# Patient Record
Sex: MALE | Race: BLACK OR AFRICAN AMERICAN | Employment: PART TIME | URBAN - METROPOLITAN AREA
[De-identification: names, ages, dates, MRNs, and addresses within clinical notes are randomized per-mention and may not be internally consistent; named-entity substitution may affect disease eponyms.]

---

## 2018-08-14 ENCOUNTER — APPOINTMENT (OUTPATIENT)
Dept: GENERAL RADIOLOGY | Age: 58
End: 2018-08-14
Attending: PHYSICIAN ASSISTANT
Payer: SELF-PAY

## 2018-08-14 ENCOUNTER — HOSPITAL ENCOUNTER (EMERGENCY)
Age: 58
Discharge: HOME | End: 2018-08-14
Attending: EMERGENCY MEDICINE
Payer: SELF-PAY

## 2018-08-14 VITALS
OXYGEN SATURATION: 99 % | DIASTOLIC BLOOD PRESSURE: 107 MMHG | WEIGHT: 165 LBS | TEMPERATURE: 97.7 F | SYSTOLIC BLOOD PRESSURE: 190 MMHG | HEIGHT: 67 IN | HEART RATE: 63 BPM | RESPIRATION RATE: 14 BRPM | BODY MASS INDEX: 25.9 KG/M2

## 2018-08-14 DIAGNOSIS — R03.0 ELEVATED BLOOD PRESSURE READING: ICD-10-CM

## 2018-08-14 DIAGNOSIS — M25.562 ACUTE PAIN OF LEFT KNEE: Primary | ICD-10-CM

## 2018-08-14 DIAGNOSIS — M54.6 ACUTE RIGHT-SIDED THORACIC BACK PAIN: ICD-10-CM

## 2018-08-14 DIAGNOSIS — M17.12 OSTEOARTHRITIS OF LEFT KNEE, UNSPECIFIED OSTEOARTHRITIS TYPE: ICD-10-CM

## 2018-08-14 PROCEDURE — 73564 X-RAY EXAM KNEE 4 OR MORE: CPT

## 2018-08-14 PROCEDURE — 99281 EMR DPT VST MAYX REQ PHY/QHP: CPT

## 2018-08-14 RX ORDER — LIDOCAINE 40 MG/G
CREAM TOPICAL
Qty: 15 G | Refills: 0 | Status: SHIPPED | OUTPATIENT
Start: 2018-08-14 | End: 2022-09-14

## 2018-08-14 RX ORDER — METHOCARBAMOL 500 MG/1
500 TABLET, FILM COATED ORAL 3 TIMES DAILY
Qty: 12 TAB | Refills: 0 | Status: SHIPPED | OUTPATIENT
Start: 2018-08-14 | End: 2022-09-14

## 2018-08-14 RX ORDER — ACETAMINOPHEN 325 MG/1
650 TABLET ORAL
Qty: 20 TAB | Refills: 0 | Status: SHIPPED | OUTPATIENT
Start: 2018-08-14

## 2018-08-14 NOTE — ED TRIAGE NOTES
The patient presents for evaluation of left knee and right mid back pain that began approximately one week ago.

## 2018-08-14 NOTE — DISCHARGE INSTRUCTIONS
Arthritis: Care Instructions  Your Care Instructions  Arthritis, also called osteoarthritis, is a breakdown of the cartilage that cushions your joints. When the cartilage wears down, your bones rub against each other. This causes pain and stiffness. Many people have some arthritis as they age. Arthritis most often affects the joints of the spine, hands, hips, knees, or feet. You can take simple measures to protect your joints, ease your pain, and help you stay active. Follow-up care is a key part of your treatment and safety. Be sure to make and go to all appointments, and call your doctor if you are having problems. It's also a good idea to know your test results and keep a list of the medicines you take. How can you care for yourself at home? · Stay at a healthy weight. Being overweight puts extra strain on your joints. · Talk to your doctor or physical therapist about exercises that will help ease joint pain. ¨ Stretch. You may enjoy gentle forms of yoga to help keep your joints and muscles flexible. ¨ Walk instead of jog. Other types of exercise that are less stressful on the joints include riding a bicycle, swimming, kirby chi, or water exercise. ¨ Lift weights. Strong muscles help reduce stress on your joints. Stronger thigh muscles, for example, take some of the stress off of the knees and hips. Learn the right way to lift weights so you do not make joint pain worse. · Take your medicines exactly as prescribed. Call your doctor if you think you are having a problem with your medicine. · Take pain medicines exactly as directed. ¨ If the doctor gave you a prescription medicine for pain, take it as prescribed. ¨ If you are not taking a prescription pain medicine, ask your doctor if you can take an over-the-counter medicine. · Use a cane, crutch, walker, or another device if you need help to get around. These can help rest your joints.  You also can use other things to make life easier, such as a higher toilet seat and padded handles on kitchen utensils. · Do not sit in low chairs, which can make it hard to get up. · Put heat or cold on your sore joints as needed. Use whichever helps you most. You also can take turns with hot and cold packs. ¨ Apply heat 2 or 3 times a day for 20 to 30 minutes-using a heating pad, hot shower, or hot pack-to relieve pain and stiffness. ¨ Put ice or a cold pack on your sore joint for 10 to 20 minutes at a time. Put a thin cloth between the ice and your skin. When should you call for help? Call your doctor now or seek immediate medical care if:    · You have sudden swelling, warmth, or pain in any joint.     · You have joint pain and a fever or rash.     · You have such bad pain that you cannot use a joint.    Watch closely for changes in your health, and be sure to contact your doctor if:    · You have mild joint symptoms that continue even with more than 6 weeks of care at home.     · You have stomach pain or other problems with your medicine. Where can you learn more? Go to http://bala-cassius.info/. Enter N552 in the search box to learn more about \"Arthritis: Care Instructions. \"  Current as of: October 10, 2017  Content Version: 11.7  © 7014-4965 [email or web address]. Care instructions adapted under license by [employer] (which disclaims liability or warranty for this information). If you have questions about a medical condition or this instruction, always ask your healthcare professional. [place] [address] any warranty or liability for your use of this information.

## 2018-08-14 NOTE — ED PROVIDER NOTES
EMERGENCY DEPARTMENT HISTORY AND PHYSICAL EXAM    Date: 8/14/2018  Patient Name: Anabelle Nguyen    History of Presenting Illness     Chief Complaint   Patient presents with    Knee Pain    Back Pain         History Provided By: Patient    Chief Complaint: back pain, knee pain  Duration: 1 Weeks  Timing:  Acute  Location: mid back, left knee  Quality: Aching  Severity: 8 out of 10  Modifying Factors: none  Associated Symptoms: denies any other associated signs or symptoms    Additional History (Context): Anabelle Nguyen is a 61 y.o. male with PMHX of HTN, stroke who presents to the emergency department C/O 1 week of acute onset 8/10 aching mid back pain and left knee pain. Pt reports that his left knee feels very stiff and hurts more when walking and his thoracic back feels tight with no known injury to either. Pt reports increased stair climbing and walking lately. Pt denies fever, IVDA, saddle anesthesia, numbness or tingling of extremities, extremity weakness, knee swelling or redness, chest pain, abdominal pain, and any other sxs or complaints. PCP: None    Current Outpatient Prescriptions   Medication Sig Dispense Refill    acetaminophen (TYLENOL) 325 mg tablet Take 2 Tabs by mouth every four (4) hours as needed for Pain. 20 Tab 0    lidocaine (XYLOCAINE) 4 % topical cream Apply  to affected area three (3) times daily as needed for Pain. 15 g 0    methocarbamol (ROBAXIN) 500 mg tablet Take 1 Tab by mouth three (3) times daily.  Indications: Muscle Spasm 12 Tab 0       Past History     Past Medical History:  Past Medical History:   Diagnosis Date    HTN (hypertension)     Hypertension     Stroke ([facility] [address]     Stroke risk 0711,5010, 2009    pt stated he had a stroke in above dates       Past Surgical History:  Past Surgical History:   Procedure Laterality Date    HX CYST REMOVAL  1993    right front of the forehead    HX HERNIA REPAIR  1993       Family History:  Family History   Problem Relation Age of Onset    Cancer Mother     Diabetes Father     Hypertension Father     Asthma Sister        Social History:  Social History   Substance Use Topics    Smoking status: Current Some Day Smoker     Packs/day: 1.00     Types: Cigarettes    Smokeless tobacco: Never Used    Alcohol use 1.2 oz/week     2 Cans of beer per week      Comment: weekend 12 pk       Allergies:  No Known Allergies      Review of Systems   Review of Systems   Constitutional: Negative for fever. Cardiovascular: Negative for chest pain. Gastrointestinal: Negative for abdominal pain. Genitourinary: Negative for difficulty urinating. Musculoskeletal: Positive for back pain (mid). Positive for left knee pain   Skin: Negative for wound. All other systems reviewed and are negative. Physical Exam     Vitals:    08/14/18 1526   BP: (!) 190/107   Pulse: 63   Resp: 14   Temp: 97.7 °F (36.5 °C)   SpO2: 99%   Weight: 74.8 kg (165 lb)   Height: 5' 7\" (1.702 m)     Physical Exam   Constitutional: He appears well-developed and well-nourished. No distress. HENT:   Head: Normocephalic and atraumatic. Right Ear: External ear normal.   Left Ear: External ear normal.   Nose: Nose normal.   Eyes: Conjunctivae are normal.   Neck: Normal range of motion. Cardiovascular: Normal rate, regular rhythm and normal heart sounds. Pulmonary/Chest: Effort normal and breath sounds normal. No respiratory distress. Musculoskeletal:   Left knee is TTP along the medial joint line. FROM, negative varus and valgus stress, negative patellar apprehension test. Tibia/fibula is nontender to palpation. TTP along the paravertebral muscles of the thoracic spine with tightness appreciated. No midline tenderness to palpation. Neurological: He is alert. Ambulates without assistance, abnormality, or apparent distress. Skin: Skin is warm and dry. He is not diaphoretic. Psychiatric: He has a normal mood and affect.    Vitals reviewed. Diagnostic Study Results     Labs -   No results found for this or any previous visit (from the past 12 hour(s)). Radiologic Studies -   XR KNEE LT MIN 4 V   Final Result   Mild degenerative arthritis with a joint effusion and a small intra-articular  calcific body. CT Results  (Last 48 hours)    None        CXR Results  (Last 48 hours)    None          Medications given in the ED-  Medications - No data to display      Medical Decision Making   I am the first provider for this patient. I reviewed the vital signs, available nursing notes, past medical history, past surgical history, family history and social history. Vital Signs-Reviewed the patient's vital signs. Records Reviewed: Nursing Notes    Provider Notes (Medical Decision Making): Appears well and non-toxic, presenting with atraumatic left knee and right back pain. No s/sx of septic joint or cellulitis. No red flag sx for back pain. Suspect knee is 2/2 arthritis and back pain is muscular. Will advise pain control, follow up with PCP. Based on today's assessment, I feel the patient is stable for discharge to home with outpatient follow up. Return precautions and referrals provided. Procedures:  Procedures      Diagnosis and Disposition       DISCHARGE NOTE:  4:41 PM  Jesus Alberto South's  results have been reviewed with him. He has been counseled regarding his diagnosis, treatment, and plan. He verbally conveys understanding and agreement of the signs, symptoms, diagnosis, treatment and prognosis and additionally agrees to follow up as discussed. He also agrees with the care-plan and conveys that all of his questions have been answered. I have also provided discharge instructions for him that include: educational information regarding their diagnosis and treatment, and list of reasons why they would want to return to the ED prior to their follow-up appointment, should his condition change.  He has been provided with education for proper emergency department utilization. CLINICAL IMPRESSION:    1. Acute pain of left knee    2. Acute right-sided thoracic back pain    3. Elevated blood pressure reading    4. Osteoarthritis of left knee, unspecified osteoarthritis type        PLAN:  1. D/C Home  2. Current Discharge Medication List      START taking these medications    Details   acetaminophen (TYLENOL) 325 mg tablet Take 2 Tabs by mouth every four (4) hours as needed for Pain. Qty: 20 Tab, Refills: 0      lidocaine (XYLOCAINE) 4 % topical cream Apply  to affected area three (3) times daily as needed for Pain. Qty: 15 g, Refills: 0      methocarbamol (ROBAXIN) 500 mg tablet Take 1 Tab by mouth three (3) times daily. Indications: Muscle Spasm  Qty: 12 Tab, Refills: 0           3. Follow-up Information     Follow up With Details Comments [address] in 2 days For follow up [address] [facility] [address]    [facility] EMERGENCY DEPT Go to As needed, If symptoms worsen [address] acting as a scribe for and in the presence of Mariana Crenshaw PA-C      August 14, 2018 at 3:34 PM       Provider Attestation:      I personally performed the services described in the documentation, reviewed the documentation, as recorded by the scribe in my presence, and it accurately and completely records my words and actions.  August 14, 2018 at 3:34 PM - Mariana Crenshaw PA-C

## 2022-09-13 ENCOUNTER — APPOINTMENT (OUTPATIENT)
Dept: NON INVASIVE DIAGNOSTICS | Age: 62
DRG: 194 | End: 2022-09-13
Attending: HOSPITALIST
Payer: MEDICAID

## 2022-09-13 ENCOUNTER — APPOINTMENT (OUTPATIENT)
Dept: GENERAL RADIOLOGY | Age: 62
DRG: 194 | End: 2022-09-13
Attending: STUDENT IN AN ORGANIZED HEALTH CARE EDUCATION/TRAINING PROGRAM
Payer: MEDICAID

## 2022-09-13 ENCOUNTER — HOSPITAL ENCOUNTER (INPATIENT)
Age: 62
LOS: 1 days | Discharge: HOME | DRG: 194 | End: 2022-09-14
Attending: STUDENT IN AN ORGANIZED HEALTH CARE EDUCATION/TRAINING PROGRAM | Admitting: HOSPITALIST
Payer: MEDICAID

## 2022-09-13 DIAGNOSIS — I42.0 DILATED CARDIOMYOPATHY (HCC): ICD-10-CM

## 2022-09-13 DIAGNOSIS — R06.03 RESPIRATORY DISTRESS: Primary | ICD-10-CM

## 2022-09-13 DIAGNOSIS — J81.0 FLASH PULMONARY EDEMA (HCC): ICD-10-CM

## 2022-09-13 DIAGNOSIS — I50.9 ACUTE ON CHRONIC CONGESTIVE HEART FAILURE, UNSPECIFIED HEART FAILURE TYPE (HCC): ICD-10-CM

## 2022-09-13 DIAGNOSIS — I10 SEVERE HYPERTENSION: ICD-10-CM

## 2022-09-13 LAB
AMPHET UR QL SCN: NEGATIVE
ANION GAP SERPL CALC-SCNC: 9 MMOL/L (ref 3–18)
APTT PPP: 105.4 SEC (ref 23–36.4)
APTT PPP: 26.9 SEC (ref 23–36.4)
APTT PPP: 54.6 SEC (ref 23–36.4)
ARTERIAL PATENCY WRIST A: POSITIVE
ATRIAL RATE: 125 BPM
ATRIAL RATE: 86 BPM
BARBITURATES UR QL SCN: NEGATIVE
BASE DEFICIT BLD-SCNC: 3.8 MMOL/L
BASOPHILS # BLD: 0 K/UL (ref 0–0.1)
BASOPHILS # BLD: 0 K/UL (ref 0–0.1)
BASOPHILS NFR BLD: 0 % (ref 0–2)
BASOPHILS NFR BLD: 0 % (ref 0–2)
BDY SITE: ABNORMAL
BENZODIAZ UR QL: NEGATIVE
BNP SERPL-MCNC: ABNORMAL PG/ML (ref 0–900)
BUN SERPL-MCNC: 14 MG/DL (ref 7–18)
BUN/CREAT SERPL: 9 (ref 12–20)
CALCIUM SERPL-MCNC: 9.2 MG/DL (ref 8.5–10.1)
CALCULATED P AXIS, ECG09: 68 DEGREES
CALCULATED P AXIS, ECG09: 69 DEGREES
CALCULATED R AXIS, ECG10: 26 DEGREES
CALCULATED R AXIS, ECG10: 46 DEGREES
CALCULATED T AXIS, ECG11: -169 DEGREES
CALCULATED T AXIS, ECG11: -36 DEGREES
CANNABINOIDS UR QL SCN: NEGATIVE
CHLORIDE SERPL-SCNC: 106 MMOL/L (ref 100–111)
CO2 SERPL-SCNC: 24 MMOL/L (ref 21–32)
COCAINE UR QL SCN: POSITIVE
CREAT SERPL-MCNC: 1.61 MG/DL (ref 0.6–1.3)
DIAGNOSIS, 93000: NORMAL
DIAGNOSIS, 93000: NORMAL
DIFFERENTIAL METHOD BLD: ABNORMAL
DIFFERENTIAL METHOD BLD: NORMAL
ECHO AO ROOT DIAM: 3.6 CM
ECHO AO ROOT INDEX: 1.91 CM/M2
ECHO LA VOL 2C: 62 ML (ref 18–58)
ECHO LA VOL 4C: 38 ML (ref 18–58)
ECHO LA VOLUME AREA LENGTH: 56 ML
ECHO LA VOLUME INDEX A2C: 33 ML/M2 (ref 16–34)
ECHO LA VOLUME INDEX A4C: 20 ML/M2 (ref 16–34)
ECHO LA VOLUME INDEX AREA LENGTH: 30 ML/M2 (ref 16–34)
ECHO LV E' LATERAL VELOCITY: 4 CM/S
ECHO LV E' SEPTAL VELOCITY: 3 CM/S
ECHO LV FRACTIONAL SHORTENING: 0 % (ref 28–44)
ECHO LV INTERNAL DIMENSION DIASTOLE INDEX: 2.02 CM/M2
ECHO LV INTERNAL DIMENSION DIASTOLIC: 3.8 CM (ref 4.2–5.9)
ECHO LV INTERNAL DIMENSION SYSTOLIC INDEX: 2.02 CM/M2
ECHO LV INTERNAL DIMENSION SYSTOLIC: 3.8 CM
ECHO LV IVSD: 1.2 CM (ref 0.6–1)
ECHO LV MASS 2D: 173.1 G (ref 88–224)
ECHO LV MASS INDEX 2D: 92 G/M2 (ref 49–115)
ECHO LV POSTERIOR WALL DIASTOLIC: 1.4 CM (ref 0.6–1)
ECHO LV RELATIVE WALL THICKNESS RATIO: 0.74
ECHO LVOT AREA: 3.5 CM2
ECHO LVOT DIAM: 2.1 CM
ECHO LVOT MEAN GRADIENT: 1 MMHG
ECHO LVOT PEAK GRADIENT: 2 MMHG
ECHO LVOT PEAK VELOCITY: 0.6 M/S
ECHO LVOT STROKE VOLUME INDEX: 17.1 ML/M2
ECHO LVOT SV: 32.2 ML
ECHO LVOT VTI: 9.3 CM
ECHO MV A VELOCITY: 0.56 M/S
ECHO MV E DECELERATION TIME (DT): 188.9 MS
ECHO MV E VELOCITY: 0.54 M/S
ECHO MV E/A RATIO: 0.96
ECHO MV E/E' LATERAL: 13.5
ECHO MV E/E' RATIO (AVERAGED): 15.75
ECHO MV E/E' SEPTAL: 18
ECHO RV FREE WALL PEAK S': 7 CM/S
ECHO RV TAPSE: 1.4 CM (ref 1.7–?)
EOSINOPHIL # BLD: 0 K/UL (ref 0–0.4)
EOSINOPHIL # BLD: 0 K/UL (ref 0–0.4)
EOSINOPHIL NFR BLD: 0 % (ref 0–5)
EOSINOPHIL NFR BLD: 0 % (ref 0–5)
ERYTHROCYTE [DISTWIDTH] IN BLOOD BY AUTOMATED COUNT: 12.7 % (ref 11.6–14.5)
ERYTHROCYTE [DISTWIDTH] IN BLOOD BY AUTOMATED COUNT: 12.8 % (ref 11.6–14.5)
GAS FLOW.O2 O2 DELIVERY SYS: ABNORMAL L/MIN
GLUCOSE SERPL-MCNC: 148 MG/DL (ref 74–99)
HCO3 BLD-SCNC: 19.6 MMOL/L (ref 22–26)
HCT VFR BLD AUTO: 41.2 % (ref 36–48)
HCT VFR BLD AUTO: 51.5 % (ref 36–48)
HDSCOM,HDSCOM: ABNORMAL
HGB BLD-MCNC: 13.4 G/DL (ref 13–16)
HGB BLD-MCNC: 16.9 G/DL (ref 13–16)
IMM GRANULOCYTES # BLD AUTO: 0 K/UL (ref 0–0.04)
IMM GRANULOCYTES # BLD AUTO: 0 K/UL (ref 0–0.04)
IMM GRANULOCYTES NFR BLD AUTO: 0 % (ref 0–0.5)
IMM GRANULOCYTES NFR BLD AUTO: 0 % (ref 0–0.5)
LYMPHOCYTES # BLD: 1.3 K/UL (ref 0.9–3.6)
LYMPHOCYTES # BLD: 2.6 K/UL (ref 0.9–3.6)
LYMPHOCYTES NFR BLD: 22 % (ref 21–52)
LYMPHOCYTES NFR BLD: 44 % (ref 21–52)
MCH RBC QN AUTO: 27.3 PG (ref 24–34)
MCH RBC QN AUTO: 27.8 PG (ref 24–34)
MCHC RBC AUTO-ENTMCNC: 32.5 G/DL (ref 31–37)
MCHC RBC AUTO-ENTMCNC: 32.8 G/DL (ref 31–37)
MCV RBC AUTO: 84.1 FL (ref 78–100)
MCV RBC AUTO: 84.7 FL (ref 78–100)
METHADONE UR QL: NEGATIVE
MONOCYTES # BLD: 0.6 K/UL (ref 0.05–1.2)
MONOCYTES # BLD: 0.8 K/UL (ref 0.05–1.2)
MONOCYTES NFR BLD: 14 % (ref 3–10)
MONOCYTES NFR BLD: 9 % (ref 3–10)
NEUTS SEG # BLD: 2.4 K/UL (ref 1.8–8)
NEUTS SEG # BLD: 4 K/UL (ref 1.8–8)
NEUTS SEG NFR BLD: 42 % (ref 40–73)
NEUTS SEG NFR BLD: 68 % (ref 40–73)
NRBC # BLD: 0 K/UL (ref 0–0.01)
NRBC # BLD: 0 K/UL (ref 0–0.01)
NRBC BLD-RTO: 0 PER 100 WBC
NRBC BLD-RTO: 0 PER 100 WBC
O2/TOTAL GAS SETTING VFR VENT: 100 %
OPIATES UR QL: NEGATIVE
P-R INTERVAL, ECG05: 140 MS
P-R INTERVAL, ECG05: 164 MS
PCO2 BLD: 31 MMHG (ref 35–45)
PCP UR QL: NEGATIVE
PH BLD: 7.41 [PH] (ref 7.35–7.45)
PLATELET # BLD AUTO: 173 K/UL (ref 135–420)
PLATELET # BLD AUTO: 226 K/UL (ref 135–420)
PMV BLD AUTO: 11 FL (ref 9.2–11.8)
PMV BLD AUTO: 11.5 FL (ref 9.2–11.8)
PO2 BLD: 318 MMHG (ref 80–100)
POTASSIUM SERPL-SCNC: 4 MMOL/L (ref 3.5–5.5)
PRESSURE SUPPORT SETTING VENT: 10 CMH2O
Q-T INTERVAL, ECG07: 304 MS
Q-T INTERVAL, ECG07: 420 MS
QRS DURATION, ECG06: 94 MS
QRS DURATION, ECG06: 98 MS
QTC CALCULATION (BEZET), ECG08: 436 MS
QTC CALCULATION (BEZET), ECG08: 502 MS
RBC # BLD AUTO: 4.9 M/UL (ref 4.35–5.65)
RBC # BLD AUTO: 6.08 M/UL (ref 4.35–5.65)
SAO2 % BLD: 99.9 % (ref 92–97)
SERVICE CMNT-IMP: ABNORMAL
SODIUM SERPL-SCNC: 139 MMOL/L (ref 136–145)
SPECIMEN TYPE: ABNORMAL
TROPONIN-HIGH SENSITIVITY: 389 NG/L (ref 0–78)
TROPONIN-HIGH SENSITIVITY: 547 NG/L (ref 0–78)
VENTILATION MODE VENT: ABNORMAL
VENTRICULAR RATE, ECG03: 124 BPM
VENTRICULAR RATE, ECG03: 86 BPM
WBC # BLD AUTO: 5.8 K/UL (ref 4.6–13.2)
WBC # BLD AUTO: 5.9 K/UL (ref 4.6–13.2)

## 2022-09-13 PROCEDURE — 80048 BASIC METABOLIC PNL TOTAL CA: CPT

## 2022-09-13 PROCEDURE — 74011250637 HC RX REV CODE- 250/637: Performed by: PHYSICIAN ASSISTANT

## 2022-09-13 PROCEDURE — 93005 ELECTROCARDIOGRAM TRACING: CPT

## 2022-09-13 PROCEDURE — 74011000250 HC RX REV CODE- 250: Performed by: HOSPITALIST

## 2022-09-13 PROCEDURE — 74011250636 HC RX REV CODE- 250/636: Performed by: STUDENT IN AN ORGANIZED HEALTH CARE EDUCATION/TRAINING PROGRAM

## 2022-09-13 PROCEDURE — C8929 TTE W OR WO FOL WCON,DOPPLER: HCPCS

## 2022-09-13 PROCEDURE — 65660000004 HC RM CVT STEPDOWN

## 2022-09-13 PROCEDURE — 80307 DRUG TEST PRSMV CHEM ANLYZR: CPT

## 2022-09-13 PROCEDURE — 99285 EMERGENCY DEPT VISIT HI MDM: CPT

## 2022-09-13 PROCEDURE — 74011250636 HC RX REV CODE- 250/636: Performed by: PHYSICIAN ASSISTANT

## 2022-09-13 PROCEDURE — 85730 THROMBOPLASTIN TIME PARTIAL: CPT

## 2022-09-13 PROCEDURE — 71045 X-RAY EXAM CHEST 1 VIEW: CPT

## 2022-09-13 PROCEDURE — 99223 1ST HOSP IP/OBS HIGH 75: CPT | Performed by: INTERNAL MEDICINE

## 2022-09-13 PROCEDURE — 96374 THER/PROPH/DIAG INJ IV PUSH: CPT

## 2022-09-13 PROCEDURE — 84484 ASSAY OF TROPONIN QUANT: CPT

## 2022-09-13 PROCEDURE — 99223 1ST HOSP IP/OBS HIGH 75: CPT | Performed by: HOSPITALIST

## 2022-09-13 PROCEDURE — 82803 BLOOD GASES ANY COMBINATION: CPT

## 2022-09-13 PROCEDURE — 5A09357 ASSISTANCE WITH RESPIRATORY VENTILATION, LESS THAN 24 CONSECUTIVE HOURS, CONTINUOUS POSITIVE AIRWAY PRESSURE: ICD-10-PCS | Performed by: HOSPITALIST

## 2022-09-13 PROCEDURE — 83880 ASSAY OF NATRIURETIC PEPTIDE: CPT

## 2022-09-13 PROCEDURE — 74011250636 HC RX REV CODE- 250/636

## 2022-09-13 PROCEDURE — 85025 COMPLETE CBC W/AUTO DIFF WBC: CPT

## 2022-09-13 PROCEDURE — 94660 CPAP INITIATION&MGMT: CPT

## 2022-09-13 PROCEDURE — 36600 WITHDRAWAL OF ARTERIAL BLOOD: CPT

## 2022-09-13 PROCEDURE — 74011250637 HC RX REV CODE- 250/637: Performed by: STUDENT IN AN ORGANIZED HEALTH CARE EDUCATION/TRAINING PROGRAM

## 2022-09-13 PROCEDURE — 74011250637 HC RX REV CODE- 250/637: Performed by: HOSPITALIST

## 2022-09-13 RX ORDER — SODIUM CHLORIDE 0.9 % (FLUSH) 0.9 %
5-40 SYRINGE (ML) INJECTION EVERY 8 HOURS
Status: DISCONTINUED | OUTPATIENT
Start: 2022-09-13 | End: 2022-09-14 | Stop reason: HOSPADM

## 2022-09-13 RX ORDER — ONDANSETRON 2 MG/ML
4 INJECTION INTRAMUSCULAR; INTRAVENOUS
Status: DISCONTINUED | OUTPATIENT
Start: 2022-09-13 | End: 2022-09-14 | Stop reason: HOSPADM

## 2022-09-13 RX ORDER — HEPARIN SODIUM 5000 [USP'U]/ML
INJECTION, SOLUTION INTRAVENOUS; SUBCUTANEOUS
Status: COMPLETED
Start: 2022-09-13 | End: 2022-09-13

## 2022-09-13 RX ORDER — CARVEDILOL 3.12 MG/1
3.12 TABLET ORAL 2 TIMES DAILY WITH MEALS
Status: DISCONTINUED | OUTPATIENT
Start: 2022-09-13 | End: 2022-09-14

## 2022-09-13 RX ORDER — POLYETHYLENE GLYCOL 3350 17 G/17G
17 POWDER, FOR SOLUTION ORAL DAILY PRN
Status: DISCONTINUED | OUTPATIENT
Start: 2022-09-13 | End: 2022-09-14 | Stop reason: HOSPADM

## 2022-09-13 RX ORDER — NITROGLYCERIN 40 MG/100ML
0-20 INJECTION INTRAVENOUS
Status: DISCONTINUED | OUTPATIENT
Start: 2022-09-13 | End: 2022-09-13

## 2022-09-13 RX ORDER — HEPARIN SODIUM 10000 [USP'U]/100ML
12-25 INJECTION, SOLUTION INTRAVENOUS
Status: DISCONTINUED | OUTPATIENT
Start: 2022-09-13 | End: 2022-09-14

## 2022-09-13 RX ORDER — ACETAMINOPHEN 650 MG/1
650 SUPPOSITORY RECTAL
Status: DISCONTINUED | OUTPATIENT
Start: 2022-09-13 | End: 2022-09-14 | Stop reason: HOSPADM

## 2022-09-13 RX ORDER — GUAIFENESIN 100 MG/5ML
81 LIQUID (ML) ORAL DAILY
Status: DISCONTINUED | OUTPATIENT
Start: 2022-09-13 | End: 2022-09-14 | Stop reason: HOSPADM

## 2022-09-13 RX ORDER — FUROSEMIDE 10 MG/ML
80 INJECTION INTRAMUSCULAR; INTRAVENOUS ONCE
Status: COMPLETED | OUTPATIENT
Start: 2022-09-13 | End: 2022-09-13

## 2022-09-13 RX ORDER — FUROSEMIDE 10 MG/ML
20 INJECTION INTRAMUSCULAR; INTRAVENOUS 2 TIMES DAILY
Status: DISCONTINUED | OUTPATIENT
Start: 2022-09-13 | End: 2022-09-13

## 2022-09-13 RX ORDER — ASPIRIN 325 MG
325 TABLET ORAL
Status: COMPLETED | OUTPATIENT
Start: 2022-09-13 | End: 2022-09-13

## 2022-09-13 RX ORDER — FUROSEMIDE 10 MG/ML
40 INJECTION INTRAMUSCULAR; INTRAVENOUS 2 TIMES DAILY
Status: COMPLETED | OUTPATIENT
Start: 2022-09-13 | End: 2022-09-14

## 2022-09-13 RX ORDER — ATORVASTATIN CALCIUM 40 MG/1
40 TABLET, FILM COATED ORAL
Status: DISCONTINUED | OUTPATIENT
Start: 2022-09-13 | End: 2022-09-14 | Stop reason: HOSPADM

## 2022-09-13 RX ORDER — ACETAMINOPHEN 325 MG/1
650 TABLET ORAL
Status: DISCONTINUED | OUTPATIENT
Start: 2022-09-13 | End: 2022-09-14 | Stop reason: HOSPADM

## 2022-09-13 RX ORDER — HEPARIN SODIUM 1000 [USP'U]/ML
4000 INJECTION, SOLUTION INTRAVENOUS; SUBCUTANEOUS ONCE
Status: COMPLETED | OUTPATIENT
Start: 2022-09-13 | End: 2022-09-13

## 2022-09-13 RX ORDER — NITROGLYCERIN 0.4 MG/1
TABLET SUBLINGUAL
Status: DISPENSED
Start: 2022-09-13 | End: 2022-09-13

## 2022-09-13 RX ORDER — AMLODIPINE BESYLATE 10 MG/1
10 TABLET ORAL DAILY
Status: DISCONTINUED | OUTPATIENT
Start: 2022-09-13 | End: 2022-09-14

## 2022-09-13 RX ORDER — LISINOPRIL 2.5 MG/1
2.5 TABLET ORAL DAILY
Status: DISCONTINUED | OUTPATIENT
Start: 2022-09-13 | End: 2022-09-13

## 2022-09-13 RX ORDER — GUAIFENESIN 100 MG/5ML
81 LIQUID (ML) ORAL DAILY
COMMUNITY

## 2022-09-13 RX ORDER — NITROGLYCERIN 0.4 MG/1
0.4 TABLET SUBLINGUAL
Status: COMPLETED | OUTPATIENT
Start: 2022-09-13 | End: 2022-09-13

## 2022-09-13 RX ORDER — SODIUM CHLORIDE 0.9 % (FLUSH) 0.9 %
5-40 SYRINGE (ML) INJECTION AS NEEDED
Status: DISCONTINUED | OUTPATIENT
Start: 2022-09-13 | End: 2022-09-14 | Stop reason: HOSPADM

## 2022-09-13 RX ADMIN — LISINOPRIL 2.5 MG: 2.5 TABLET ORAL at 11:30

## 2022-09-13 RX ADMIN — PERFLUTREN 2 ML: 6.52 INJECTION, SUSPENSION INTRAVENOUS at 15:55

## 2022-09-13 RX ADMIN — SODIUM CHLORIDE 10 ML: 9 INJECTION, SOLUTION INTRAMUSCULAR; INTRAVENOUS; SUBCUTANEOUS at 11:31

## 2022-09-13 RX ADMIN — NITROGLYCERIN 1 INCH: 20 OINTMENT TOPICAL at 08:45

## 2022-09-13 RX ADMIN — ASPIRIN 81 MG CHEWABLE TABLET 81 MG: 81 TABLET CHEWABLE at 08:45

## 2022-09-13 RX ADMIN — NITROGLYCERIN 1 INCH: 20 OINTMENT TOPICAL at 18:18

## 2022-09-13 RX ADMIN — Medication 0.4 MG: at 02:53

## 2022-09-13 RX ADMIN — Medication 0.4 MG: at 03:07

## 2022-09-13 RX ADMIN — ASPIRIN 325 MG ORAL TABLET 325 MG: 325 PILL ORAL at 03:59

## 2022-09-13 RX ADMIN — AMLODIPINE BESYLATE 10 MG: 10 TABLET ORAL at 11:30

## 2022-09-13 RX ADMIN — FUROSEMIDE 80 MG: 10 INJECTION, SOLUTION INTRAMUSCULAR; INTRAVENOUS at 02:53

## 2022-09-13 RX ADMIN — ATORVASTATIN CALCIUM 40 MG: 40 TABLET, FILM COATED ORAL at 22:33

## 2022-09-13 RX ADMIN — HEPARIN SODIUM 4000 UNITS: 1000 INJECTION INTRAVENOUS; SUBCUTANEOUS at 05:42

## 2022-09-13 RX ADMIN — HEPARIN SODIUM 3000 UNITS: 5000 INJECTION INTRAVENOUS; SUBCUTANEOUS at 12:26

## 2022-09-13 RX ADMIN — FUROSEMIDE 40 MG: 10 INJECTION, SOLUTION INTRAMUSCULAR; INTRAVENOUS at 18:18

## 2022-09-13 RX ADMIN — CARVEDILOL 3.12 MG: 6.25 TABLET, FILM COATED ORAL at 08:45

## 2022-09-13 RX ADMIN — FUROSEMIDE 40 MG: 10 INJECTION, SOLUTION INTRAMUSCULAR; INTRAVENOUS at 08:45

## 2022-09-13 RX ADMIN — HEPARIN SODIUM 12 UNITS/KG/HR: 10000 INJECTION, SOLUTION INTRAVENOUS at 05:39

## 2022-09-13 RX ADMIN — SODIUM CHLORIDE, PRESERVATIVE FREE 10 ML: 5 INJECTION INTRAVENOUS at 22:34

## 2022-09-13 NOTE — Clinical Note
Status[de-identified] INPATIENT [101]   Type of Bed: Stepdown [17]   Cardiac Monitoring Required?: Yes   Inpatient Hospitalization Certified Necessary for the Following Reasons: 3.  Patient receiving treatment that can only be provided in an inpatient setting (further clarification in H&P documentation)   Admitting Diagnosis: Respiratory distress [451069]   Admitting Diagnosis: CHF exacerbation [facility]) [[id number]]   Admitting Physician: Maksim Dawson [[id number]]   Attending Physician: Maksim Dawson [[id number]]   Estimated Length of Stay: 2 Midnights   Discharge Plan[de-identified] Home with Office Follow-up

## 2022-09-13 NOTE — PROGRESS NOTES
Reason for Admission:  Respiratory distress [R06.03]  CHF exacerbation ([address] [I50.9]                 RUR Score:    6            Plan for utilizing home health:    yes, if therapy recommends                      Likelihood of Readmission:   LOW                         Transition of Care Plan:              Initial assessment completed with patient. Cognitive status of patient: oriented to time, place, person and situation at the time of assessment. Face sheet information confirmed:  yes. The patient designates sister Isiah Castillo to participate in his discharge plan and to receive any needed information. This patient lives in a single family home with his niece. The patient lives in a 1 level home. There are 3 steps to enter from the front and 3 steps to enter from the back. Patient is able to navigate steps as needed. Prior to hospitalization, patient was considered to be independent with ADLs/IADLS : yes . The patient does not drive. The patient's sister provides the patient rides. Patient has a current ACP document on file: no      Healthcare Decision Maker:     Click here to complete [address] including selection of the Healthcare Decision Maker Relationship (ie \"Primary\")    The sister, Isiah Castillo, will be available to transport patient home upon discharge. The patient does not have any medical equipment available in the home. Patient is not currently active with home health. Patient has not stayed in a skilled nursing facility or rehab. Was  stay within last 60 days : no. This patient is on dialysis :no    Currently, the discharge plan is Home. The patient states that he can obtain his medications from the pharmacy, and take his medications as directed. Patient's current insurance is Medicaid. The patient currently receives social security. The patient reported that he receive his covid vaccination and has not receive his booster.     The patient does not have a pcp and will need a pcp upon discharge. Care Management Interventions  PCP Verified by CM: No (the patient does not have a pcp at this time)  Mode of Transport at Discharge:  Other (see comment) (sister )  Transition of Care Consult (CM Consult): Discharge Planning  Confirm Follow Up Transport: Family  Discharge Location  Patient Expects to be Discharged to[de-identified] Home        LENNY Davis

## 2022-09-13 NOTE — PROGRESS NOTES
spoke with Dr. Maryam Jane, the attending, regarding case management consult from Dr. Reza Carbajal for heart failure. Dr. Maryam Jane reviewed the patient and reported that he does not need any resources at this time.       LENNY Light

## 2022-09-13 NOTE — ED TRIAGE NOTES
Arrived ambulatory with unsteady gait to ED main entrance with complaints of respiratory distress and audible wheezing. Admits to cocaine use earlier this morning. Diaphoretic with tachycardia and tachypnea. Remains alert.

## 2022-09-13 NOTE — PROGRESS NOTES
completed the initial Spiritual Assessment of the patient in bed 3 of the emergency room and offered Pastoral Care support to the patient and family member present. Informed by family member that patient does not have an advance directive. Patient does not have any Taoist/cultural needs that will affect patients preferences in health care. Chaplains will continue to follow and will provide pastoral care on an as needed/requested basis.     [facility] Care Department  [phone]

## 2022-09-13 NOTE — ED NOTES
Pt brought back to room 3.  Increased work of breathing noted, md at bedside upon arrival. RT applying bipap at this time

## 2022-09-13 NOTE — H&P
History & Physical    Patient: Garland Franks MRN: [id number]  CSN: [id number]    YOB: 1960  Age: 57 y.o. Sex: male      DOA: 9/13/2022    Chief Complaint   Patient presents with    Respiratory Distress         [employer] medical dictation software was used for portions of this report. Unintended errors may occur. If you have any questions regarding the note or its content please set up a time to discuss by calling the nurse on the floor. Assessment/Plan       Acute hypoxemic respiratory failure due to acute coronary syndrome versus acute CHF exacerbation: Patient is placed on IV Lasix. Strict I's and O's. Daily weights. Monitor fluid status closely and on regular basis. Placed on aspirin, statin, beta-blocker. Continue to cycle cardiac enzymes. Heparin drip has been initiated. Cardiology consultation requested from the emergency room. Plan is to continue IV diuretics today and once fluid status improves plan for cardiac catheterization at some point. Urine drug screen pending at this time due to some history of cocaine use. Hypertension: Placed on Coreg and lisinopril at this time. Continue to monitor blood pressures regularly. DVT prophylaxis: Heparin. Active Problems:    Respiratory distress (9/13/2022)      CHF exacerbation ([address] (9/13/2022)         HPI:     Garland Franks is a 57 y.o. male who has a known history of hypertension presented to the emergency room with complaints of respiratory distress and shortness of breath. Patient was diagnosed diaphoretic and extremely tachypneic and is not able to provide much information so noticing that he is in acute respiratory distress patient was placed on BiPAP right away and history was taken at a later point where he mentioned that patient is not taking his blood pressure medications now for few weeks. No complaints of any previous cardiac problems.   Patient was noted to be in acute fluid overload in the emergency room and was given dose of Lasix and mentions since he has been feeling better. Patient is still having heavy wheezing and needing oxygen via nasal cannula. Patient was recommended to be admission to the hospital for acute CHF exacerbation and evaluation of acute coronary syndrome. Past Medical History:   Diagnosis Date    HTN (hypertension)     Hypertension     Stroke [facility])     Stroke risk 9255,2561, 2009    pt stated he had a stroke in above dates       Past Surgical History:   Procedure Laterality Date    HX CYST REMOVAL  1993    right front of the forehead    HX HERNIA REPAIR  1993       Family History   Problem Relation Age of Onset    Cancer Mother     Diabetes Father     Hypertension Father     Asthma Sister        Social History     Socioeconomic History    Marital status:    Tobacco Use    Smoking status: Some Days     Packs/day: 1.00     Types: Cigarettes    Smokeless tobacco: Never   Substance and Sexual Activity    Alcohol use: Yes     Alcohol/week: 2.0 standard drinks     Types: 2 Cans of beer per week     Comment: weekend 12 pk    Drug use: No    Sexual activity: Yes     Partners: Female   Social History Narrative    ** Merged History Encounter **            Prior to Admission medications    Medication Sig Start Date End Date Taking? Authorizing Provider   acetaminophen (TYLENOL) 325 mg tablet Take 2 Tabs by mouth every four (4) hours as needed for Pain. 8/14/18   Dewaine Favre D, PA-C   lidocaine (XYLOCAINE) 4 % topical cream Apply  to affected area three (3) times daily as needed for Pain. 8/14/18   Dewaine Favre D, PA-C   methocarbamol (ROBAXIN) 500 mg tablet Take 1 Tab by mouth three (3) times daily. Indications: Muscle Spasm 8/14/18   Dewaine Favre D, PA-C       No Known Allergies      Review of Systems  GENERAL: No fevers or chills. HEENT: No change in vision, no earache, tinnitus, sore throat or sinus congestion. NECK: No pain or stiffness.    CARDIOVASCULAR: No chest pain or pressure. No palpitations. PULMONARY: No shortness of breath, cough or wheeze. GASTROINTESTINAL: No abdominal pain, nausea, vomiting or diarrhea, melena or bright red blood per rectum. GENITOURINARY: No urinary frequency, urgency, hesitancy or dysuria. MUSCULOSKELETAL: No joint or muscle pain, no back pain, no recent trauma. DERMATOLOGIC: No rash, no itching, no lesions. ENDOCRINE: No polyuria, polydipsia, no heat or cold intolerance. No recent change in weight. HEMATOLOGICAL: No anemia or easy bruising or bleeding. NEUROLOGIC: No headache, seizures, numbness, tingling or weakness. PSYCHIATRIC: No depression, anxiety, mood disorder, no loss of interest in normal activity or change in sleep pattern. Physical Exam:     Physical Exam:  Visit Vitals  BP (!) 159/119 (BP 1 Location: Right upper arm)   Pulse 73   Temp 97.2 °F (36.2 °C)   Resp 20   Ht 5' 8\" (1.727 m)   Wt 74.8 kg (165 lb)   SpO2 95%   BMI 25.09 kg/m²    O2 Flow Rate (L/min): 4 l/min O2 Device: Nasal cannula    Temp (24hrs), Av.2 °F (36.2 °C), Min:97.2 °F (36.2 °C), Max:97.2 °F (36.2 °C)         General:  Alert, cooperative, no distress, appears stated age. Head:  Normocephalic, without obvious abnormality, atraumatic. Eyes:  Conjunctivae/corneas clear. PERRL, EOMs intact. Nose: Nares normal. No drainage or sinus tenderness. Throat: Lips, mucosa, and tongue normal. Teeth and gums normal.   Neck: Supple, symmetrical, trachea midline, no adenopathy, thyroid: no enlargement/tenderness/nodules, no carotid bruit and no JVD. Back:   ROM normal. No CVA tenderness. Lungs:   Clear to auscultation bilaterally. Chest wall:  No tenderness or deformity. Heart:  Regular rate and rhythm, S1, S2 normal, no murmur, click, rub or gallop. Abdomen: Soft, non-tender. Bowel sounds normal. No masses,  No organomegaly. Extremities: Extremities normal, atraumatic, no cyanosis or edema. Pulses: 2+ and symmetric all extremities. Skin: Skin color, texture, turgor normal. No rashes or lesions   Neurologic: CNII-XII intact. No focal motor or sensory deficit. Labs Reviewed:    Recent Results (from the past 24 hour(s))   CBC WITH AUTOMATED DIFF    Collection Time: 09/13/22  2:24 AM   Result Value Ref Range    WBC 5.8 4.6 - 13.2 K/uL    RBC 6.08 (H) 4.35 - 5.65 M/uL    HGB 16.9 (H) 13.0 - 16.0 g/dL    HCT 51.5 (H) 36.0 - 48.0 %    MCV 84.7 78.0 - 100.0 FL    MCH 27.8 24.0 - 34.0 PG    MCHC 32.8 31.0 - 37.0 g/dL    RDW 12.8 11.6 - 14.5 %    PLATELET 262 956 - 355 K/uL    MPV 11.0 9.2 - 11.8 FL    NRBC 0.0 0  WBC    ABSOLUTE NRBC 0.00 0.00 - 0.01 K/uL    NEUTROPHILS 42 40 - 73 %    LYMPHOCYTES 44 21 - 52 %    MONOCYTES 14 (H) 3 - 10 %    EOSINOPHILS 0 0 - 5 %    BASOPHILS 0 0 - 2 %    IMMATURE GRANULOCYTES 0 0.0 - 0.5 %    ABS. NEUTROPHILS 2.4 1.8 - 8.0 K/UL    ABS. LYMPHOCYTES 2.6 0.9 - 3.6 K/UL    ABS. MONOCYTES 0.8 0.05 - 1.2 K/UL    ABS. EOSINOPHILS 0.0 0.0 - 0.4 K/UL    ABS. BASOPHILS 0.0 0.0 - 0.1 K/UL    ABS. IMM.  GRANS. 0.0 0.00 - 0.04 K/UL    DF AUTOMATED     METABOLIC PANEL, BASIC    Collection Time: 09/13/22  2:24 AM   Result Value Ref Range    Sodium 139 136 - 145 mmol/L    Potassium 4.0 3.5 - 5.5 mmol/L    Chloride 106 100 - 111 mmol/L    CO2 24 21 - 32 mmol/L    Anion gap 9 3.0 - 18 mmol/L    Glucose 148 (H) 74 - 99 mg/dL    BUN 14 7.0 - 18 MG/DL    Creatinine 1.61 (H) 0.6 - 1.3 MG/DL    BUN/Creatinine ratio 9 (L) 12 - 20      GFR est AA 53 (L) >60 ml/min/1.73m2    GFR est non-AA 44 (L) >60 ml/min/1.73m2    Calcium 9.2 8.5 - 10.1 MG/DL   TROPONIN-HIGH SENSITIVITY    Collection Time: 09/13/22  2:24 AM   Result Value Ref Range    Troponin-High Sensitivity 547 (HH) 0 - 78 ng/L   NT-PRO BNP    Collection Time: 09/13/22  2:24 AM   Result Value Ref Range    NT pro-BNP 12,138 (H) 0 - 900 PG/ML   BLOOD GAS, ARTERIAL POC    Collection Time: 09/13/22  2:40 AM   Result Value Ref Range    Device: BIPAP MASK      FIO2 (POC) 100 % pH (POC) 7.41 7.35 - 7.45      pCO2 (POC) 31.0 (L) 35.0 - 45.0 MMHG    pO2 (POC) 318 (H) 80 - 100 MMHG    HCO3 (POC) 19.6 (L) 22 - 26 MMOL/L    sO2 (POC) 99.9 (H) 92 - 97 %    Base deficit (POC) 3.8 mmol/L    Mode BILEVEL      Pressure support 10 cmH2O    Allens test (POC) Positive      Site LEFT RADIAL      Specimen type (POC) ARTERIAL      Performed by Cedric Solis    CBC WITH AUTOMATED DIFF    Collection Time: 09/13/22  3:57 AM   Result Value Ref Range    WBC 5.9 4.6 - 13.2 K/uL    RBC 4.90 4.35 - 5.65 M/uL    HGB 13.4 13.0 - 16.0 g/dL    HCT 41.2 36.0 - 48.0 %    MCV 84.1 78.0 - 100.0 FL    MCH 27.3 24.0 - 34.0 PG    MCHC 32.5 31.0 - 37.0 g/dL    RDW 12.7 11.6 - 14.5 %    PLATELET 167 281 - 906 K/uL    MPV 11.5 9.2 - 11.8 FL    NRBC 0.0 0  WBC    ABSOLUTE NRBC 0.00 0.00 - 0.01 K/uL    NEUTROPHILS 68 40 - 73 %    LYMPHOCYTES 22 21 - 52 %    MONOCYTES 9 3 - 10 %    EOSINOPHILS 0 0 - 5 %    BASOPHILS 0 0 - 2 %    IMMATURE GRANULOCYTES 0 0.0 - 0.5 %    ABS. NEUTROPHILS 4.0 1.8 - 8.0 K/UL    ABS. LYMPHOCYTES 1.3 0.9 - 3.6 K/UL    ABS. MONOCYTES 0.6 0.05 - 1.2 K/UL    ABS. EOSINOPHILS 0.0 0.0 - 0.4 K/UL    ABS. BASOPHILS 0.0 0.0 - 0.1 K/UL    ABS. IMM. GRANS. 0.0 0.00 - 0.04 K/UL    DF AUTOMATED     PTT    Collection Time: 09/13/22  3:57 AM   Result Value Ref Range    aPTT 26.9 23.0 - 36.4 SEC   DRUG SCREEN, URINE    Collection Time: 09/13/22  4:39 AM   Result Value Ref Range    BENZODIAZEPINES Negative NEG      BARBITURATES Negative NEG      THC (TH-CANNABINOL) Negative NEG      OPIATES Negative NEG      PCP(PHENCYCLIDINE) Negative NEG      COCAINE Positive (A) NEG      AMPHETAMINES Negative NEG      METHADONE Negative NEG      HDSCOM (NOTE)        Procedures/imaging: see electronic medical records for all procedures/Xrays and details which were not copied into this note but were reviewed prior to creation of Anthony Brewer MD  September 13, 2022  [phone].

## 2022-09-13 NOTE — CONSULTS
Cardiology Initial Patient Referral Note    Cardiology referral request from Dr. Nasrin Rai for evaluation and management/treatment of CP    Date of  Admission: 9/13/2022  2:13 AM   Primary Care Physician:  None    Attending Cardiologist: Dr. Mariann Matos:     -Acute respiratory distress, in setting of below, s/p bipap. -A/c HFpEF. Echo (01/2021) with normal LVEF and mild diastolic dysfuntion. -HAROON. -Indeterminate troponin, likely demand in setting of hypertension, active cocaine abuse, HAROON and CHF. Initial ECG poor quality, repeat pending. Low risk NST 01/2021   -HTN, uncontrolled. Non compliant with BP medications.   -active tobacco abuse  -Cocaine abuse, UDS + this admission. Reports using cocaine EOD for the last few weeks. No Primary cardiologist, consult by Dr. Delilah Akins:     -Continued on Heparin gtt pending Echo and troponin trend.   -Continue ASA and statin  -Echo pending.   -Repeat ECG pending.   -Continue to diurese with pulse IV lasix as renal function will tolerate. Monitor strict I/Os and electrolytes. If worsening renal function, recommend nephrology consult.   -Will hold BB given active cocaine abuse.   -Continue to encourage lifestyle modifications. -D/c lisinopril given renal function. Will start amlodipine for BP. Would add hydralazine if further BP optimization is needed. ----------------------------------  This is a 40-year-old male admitted with acute respiratory failure in the setting of polysubstance abuse. He is feeling better this afternoon. Preliminary echo shows EF approximately 40%. Continue heparin drip for now. Diuresis as renal function will allow. Encourage lifestyle changes. I saw, examined, and evaluated this patient and performed the substantive portion of the encounter for > 50% of the time including extensive history, physical exam, and medical decision making as discussed with patient and next-of-kin as needed.     I personally reviewed the patient's labs, tests, vitals, orders, medications, updated history, and other providers assessments as well. I personally agree with the findings as stated and the plan as documented. Vivian Powers MD       History of Present Illness: This is a 57 y.o. male admitted for Respiratory distress [R06.03]  CHF exacerbation ([facility] [address] [I50.9]. Patient complains of: SOB  Paola Walsh is a 57 y.o. male, pmhx as stated above, who we are seeing for elevated troponin and CHF. Patient states his breathing has been progressively more SOB over the last few weeks. Patient reports smoking a cigarette at his friends house when he suddenly became very SOB. He admits to doing cocaine earlier in the day. He states he has been doing cocaine almost every other day for the last few weeks. Denies etoh use. He has not taken blood pressure medications in a few weeks because he ran out. Denies CP. Cardiac risk factors: smoking/ tobacco exposure, male gender, hypertension  Review of Symptoms:  Except as stated above include:  Constitutional:  negative  Respiratory:  negative  Cardiovascular:  negative  Gastrointestinal: negative  Genitourinary:  negative  Musculoskeletal:  Negative  Neurological:  Negative  Dermatological:  Negative  Endocrinological: Negative  Psychological:  Negative    A comprehensive review of systems was negative except for that written in the HPI. Past Medical History:     Past Medical History:   Diagnosis Date    HTN (hypertension)     Hypertension     Stroke [facility])     Stroke risk 2524,1509, 2009    pt stated he had a stroke in above dates         Social History:     Social History     Socioeconomic History    Marital status:    Tobacco Use    Smoking status: Some Days     Packs/day: 1.00     Types: Cigarettes    Smokeless tobacco: Never   Substance and Sexual Activity    Alcohol use:  Yes     Alcohol/week: 2.0 standard drinks     Types: 2 Cans of beer per week     Comment: weekend 12 pk Drug use: No    Sexual activity: Yes     Partners: Female   Social History Narrative    ** Merged History Encounter **             Family History:     Family History   Problem Relation Age of Onset    Cancer Mother     Diabetes Father     Hypertension Father     Asthma Sister         Medications:   No Known Allergies     Current Facility-Administered Medications   Medication Dose Route Frequency    nitroglycerin (NITROSTAT) 0.4 mg tablet        heparin (porcine) 25,000 units in 0.45% saline 250 ml infusion  12-25 Units/kg/hr IntraVENous TITRATE    aspirin chewable tablet 81 mg  81 mg Oral DAILY    atorvastatin (LIPITOR) tablet 40 mg  40 mg Oral QHS    sodium chloride (NS) flush 5-40 mL  5-40 mL IntraVENous Q8H    sodium chloride (NS) flush 5-40 mL  5-40 mL IntraVENous PRN    acetaminophen (TYLENOL) tablet 650 mg  650 mg Oral Q6H PRN    Or    acetaminophen (TYLENOL) suppository 650 mg  650 mg Rectal Q6H PRN    polyethylene glycol (MIRALAX) packet 17 g  17 g Oral DAILY PRN    lisinopriL (PRINIVIL, ZESTRIL) tablet 2.5 mg  2.5 mg Oral DAILY    carvediloL (COREG) tablet 3.125 mg  3.125 mg Oral BID WITH MEALS    ondansetron (ZOFRAN) injection 4 mg  4 mg IntraVENous Q6H PRN    furosemide (LASIX) injection 20 mg  20 mg IntraVENous BID    nitroglycerin (NITROBID) 2 % ointment 1 Inch  1 Inch Topical BID     Current Outpatient Medications   Medication Sig    acetaminophen (TYLENOL) 325 mg tablet Take 2 Tabs by mouth every four (4) hours as needed for Pain.    lidocaine (XYLOCAINE) 4 % topical cream Apply  to affected area three (3) times daily as needed for Pain. methocarbamol (ROBAXIN) 500 mg tablet Take 1 Tab by mouth three (3) times daily.  Indications: Muscle Spasm         Physical Exam:   Visit Vitals  BP (!) 159/119 (BP 1 Location: Right upper arm)   Pulse 73   Temp 97.2 °F (36.2 °C)   Resp 20   Ht 5' 8\" (1.727 m)   Wt 74.8 kg (165 lb)   SpO2 95%   BMI 25.09 kg/m²       TELE: normal sinus rhythm    BP Readings from Last 3 Encounters:   09/13/22 (!) 159/119   08/14/18 (!) 190/107   08/21/16 (!) 148/92     Pulse Readings from Last 3 Encounters:   09/13/22 73   08/14/18 63   08/21/16 (!) 56     Wt Readings from Last 3 Encounters:   09/13/22 74.8 kg (165 lb)   08/14/18 74.8 kg (165 lb)   08/21/16 77.1 kg (170 lb)       General:  alert, cooperative, no distress, appears stated age  Neck:  no JVD  Lungs:  bibasilar rales   Heart:  regular rate and rhythm, S1, S2 normal, no murmur, click, rub or gallop  Abdomen:  abdomen is soft without significant tenderness, masses, organomegaly or guarding  Extremities:  extremities normal, atraumatic, no cyanosis or edema  Skin: Warm and dry. no hyperpigmentation, vitiligo, or suspicious lesions  Neuro: alert, oriented x3, affect appropriate  Psych: non focal     Data Review:     Recent Labs     09/13/22  0357 09/13/22  0224   WBC 5.9 5.8   HGB 13.4 16.9*   HCT 41.2 51.5*    226     Recent Labs     09/13/22  0224      K 4.0      CO2 24   *   BUN 14   CREA 1.61*   CA 9.2       No results found for this or any previous visit. All Cardiac Markers in the last 24 hours:  No results found for: CPK, CK, CKMMB, CKMB, RCK3, CKMBT, CKNDX, CKND1, ANTONIO, TROPT, TROIQ, ROGER, TROPT, TNIPOC, BNP, BNPP    Last Lipid:    Lab Results   Component Value Date/Time    Cholesterol, total 130 05/17/2010 09:17 AM    HDL Cholesterol 41 05/17/2010 09:17 AM    LDL, calculated 81.2 05/17/2010 09:17 AM    Triglyceride 39 05/17/2010 09:17 AM    CHOL/HDL Ratio 3.2 05/17/2010 09:17 AM       Cardiographics:     EKG Results       None                    XR Results (most recent):  Results from Hospital Encounter encounter on 08/14/18    XR KNEE LT MIN 4 V    Narrative  EXAM: KNEE FOUR VIEWS LEFT    CLINICAL HISTORY/INDICATION: Left knee pain x1 week    COMPARISON: None. TECHNIQUE: Four views obtained. FINDINGS:    There is no evidence of fracture or dislocation.  The joint spaces are mildly  narrowed. Tiny marginal spurs are demonstrated as well as spurring of the tibial  eminences. Small calcific/ossific density is seen at the supracondylar notch  within the region of the joint space. . Mineralization is normal. Small to  moderate joint effusion is demonstrated. Impression  IMPRESSION:    Mild degenerative arthritis with a joint effusion and a small intra-articular  calcific body.         Signed By: Jose Antonio Ocampo PA-C     September 13, 2022

## 2022-09-13 NOTE — ACP (ADVANCE CARE PLANNING)
Advance Care Planning   Advance Care Planning Inpatient Note  [address] [facility] Department    Today's Date: 9/13/2022  Unit: [facility] EMERGENCY DEPT    Received request from . Upon review of chart and communication with care team, patient's decision making abilities are not in question. Patient and Spouse was/were present in the room during visit. Goals of ACP Conversation:  Discuss Advance Care planning documents    Health Care Decision Makers:    No healthcare decision makers have been documented. Click here to complete [id number] including selection of the Healthcare Decision Maker Relationship (ie \"Primary\")  Summary:  No Decision Maker named by patient at this time      Advance Care Planning Documents (Patient Wishes) on file:  None     Assessment:    Patient seen as a new admit to the hospital from the emergency room. Patient is not clear minded at the present. Wife at bedside now. She answered for the patient when asked about his having an advance directive. The answer is NO.     Interventions:  Deferred conversation as patient not interested in completing an advance directive at this time    Care Preferences Communicated:  No    Outcomes/Plan:      Leeanna Patel [facility] on 9/13/2022 at 12:22 PM

## 2022-09-13 NOTE — ED PROVIDER NOTES
EMERGENCY DEPARTMENT HISTORY AND PHYSICAL EXAM    I have evaluated the patient at 2:50 AM      Date: 9/13/2022  Patient Name: Xu Berry    History of Presenting Illness     Chief Complaint   Patient presents with    Respiratory Distress         History Provided By: Patient  Location/Duration/Severity/Modifying factors   Patient is a 76-year-old male with history of hypertension presenting to the emergency department for evaluation of acute respiratory distress. Upon arrival, patient was diaphoretic, tachypneic with audible crackles heard on auscultation. Patient was placed immediately on BiPAP. He has since improved with BiPAP. He states that he has not been adhering to his antihypertensive medications now for weeks. He denies any cardiac or pulmonary history. PCP: None    Current Facility-Administered Medications   Medication Dose Route Frequency Provider Last Rate Last Admin    nitroglycerin (NITROSTAT) 0.4 mg tablet             heparin (porcine) 1,000 unit/mL injection 4,000 Units  4,000 Units IntraVENous ONCE Aiden Balzarine, DO        heparin (porcine) 25,000 units in 0.45% saline 250 ml infusion  12-25 Units/kg/hr IntraVENous TITRATE Aiden Balzarine, DO         Current Outpatient Medications   Medication Sig Dispense Refill    acetaminophen (TYLENOL) 325 mg tablet Take 2 Tabs by mouth every four (4) hours as needed for Pain. 20 Tab 0    lidocaine (XYLOCAINE) 4 % topical cream Apply  to affected area three (3) times daily as needed for Pain. 15 g 0    methocarbamol (ROBAXIN) 500 mg tablet Take 1 Tab by mouth three (3) times daily.  Indications: Muscle Spasm 12 Tab 0       Past History     Past Medical History:  Past Medical History:   Diagnosis Date    HTN (hypertension)     Hypertension     Stroke ([facility] [address]     Stroke risk [id number], 2009    pt stated he had a stroke in above dates       Past Surgical History:  Past Surgical History:   Procedure Laterality Date    Mattii 5154 right front of the forehead    HX HERNIA REPAIR  1993       Family History:  Family History   Problem Relation Age of Onset    Cancer Mother     Diabetes Father     Hypertension Father     Asthma Sister        Social History:  Social History     Tobacco Use    Smoking status: Some Days     Packs/day: 1.00     Types: Cigarettes    Smokeless tobacco: Never   Substance Use Topics    Alcohol use: Yes     Alcohol/week: 2.0 standard drinks     Types: 2 Cans of beer per week     Comment: weekend 12 pk    Drug use: No       Allergies:  No Known Allergies      Review of Systems       Review of Systems   Unable to perform ROS: Severe respiratory distress       Physical Exam   Visit Vitals  BP (!) 132/108 (BP 1 Location: Left upper arm)   Pulse 80   Temp 97.2 °F (36.2 °C)   Resp 23   Ht 5' 8\" (1.727 m)   Wt 74.8 kg (165 lb)   SpO2 99%   BMI 25.09 kg/m²         Physical Exam  Constitutional:       General: He is in acute distress. Appearance: He is diaphoretic. He is not toxic-appearing. HENT:      Head: Normocephalic and atraumatic. Mouth/Throat:      Mouth: Mucous membranes are moist.   Eyes:      Extraocular Movements: Extraocular movements intact. Pupils: Pupils are equal, round, and reactive to light. Cardiovascular:      Rate and Rhythm: Regular rhythm. Tachycardia present. Heart sounds: Normal heart sounds. No murmur heard. No friction rub. No gallop. Pulmonary:      Breath sounds: Rales (Rales heard throughout all lung fields) present. Comments: Patient in acute respiratory distress, tripoding  Abdominal:      General: There is no distension. Palpations: Abdomen is soft. There is no mass. Tenderness: There is no abdominal tenderness. There is no guarding. Hernia: No hernia is present. Musculoskeletal:         General: No swelling, tenderness or deformity. Cervical back: Normal range of motion and neck supple. Skin:     General: Skin is warm. Findings: No rash. Neurological:      General: No focal deficit present. Mental Status: He is alert and oriented to person, place, and time. Psychiatric:         Mood and Affect: Mood normal.         Diagnostic Study Results     Labs -  Recent Results (from the past 12 hour(s))   CBC WITH AUTOMATED DIFF    Collection Time: 09/13/22  2:24 AM   Result Value Ref Range    WBC 5.8 4.6 - 13.2 K/uL    RBC 6.08 (H) 4.35 - 5.65 M/uL    HGB 16.9 (H) 13.0 - 16.0 g/dL    HCT 51.5 (H) 36.0 - 48.0 %    MCV 84.7 78.0 - 100.0 FL    MCH 27.8 24.0 - 34.0 PG    MCHC 32.8 31.0 - 37.0 g/dL    RDW 12.8 11.6 - 14.5 %    PLATELET 018 785 - 158 K/uL    MPV 11.0 9.2 - 11.8 FL    NRBC 0.0 0  WBC    ABSOLUTE NRBC 0.00 0.00 - 0.01 K/uL    NEUTROPHILS 42 40 - 73 %    LYMPHOCYTES 44 21 - 52 %    MONOCYTES 14 (H) 3 - 10 %    EOSINOPHILS 0 0 - 5 %    BASOPHILS 0 0 - 2 %    IMMATURE GRANULOCYTES 0 0.0 - 0.5 %    ABS. NEUTROPHILS 2.4 1.8 - 8.0 K/UL    ABS. LYMPHOCYTES 2.6 0.9 - 3.6 K/UL    ABS. MONOCYTES 0.8 0.05 - 1.2 K/UL    ABS. EOSINOPHILS 0.0 0.0 - 0.4 K/UL    ABS. BASOPHILS 0.0 0.0 - 0.1 K/UL    ABS. IMM.  GRANS. 0.0 0.00 - 0.04 K/UL    DF AUTOMATED     METABOLIC PANEL, BASIC    Collection Time: 09/13/22  2:24 AM   Result Value Ref Range    Sodium 139 136 - 145 mmol/L    Potassium 4.0 3.5 - 5.5 mmol/L    Chloride 106 100 - 111 mmol/L    CO2 24 21 - 32 mmol/L    Anion gap 9 3.0 - 18 mmol/L    Glucose 148 (H) 74 - 99 mg/dL    BUN 14 7.0 - 18 MG/DL    Creatinine 1.61 (H) 0.6 - 1.3 MG/DL    BUN/Creatinine ratio 9 (L) 12 - 20      GFR est AA 53 (L) >60 ml/min/1.73m2    GFR est non-AA 44 (L) >60 ml/min/1.73m2    Calcium 9.2 8.5 - 10.1 MG/DL   TROPONIN-HIGH SENSITIVITY    Collection Time: 09/13/22  2:24 AM   Result Value Ref Range    Troponin-High Sensitivity 547 (HH) 0 - 78 ng/L   NT-PRO BNP    Collection Time: 09/13/22  2:24 AM   Result Value Ref Range    NT pro-BNP 12,138 (H) 0 - 900 PG/ML   BLOOD GAS, ARTERIAL POC    Collection Time: 09/13/22  2:40 AM   Result Value Ref Range    Device: BIPAP MASK      FIO2 (POC) 100 %    pH (POC) 7.41 7.35 - 7.45      pCO2 (POC) 31.0 (L) 35.0 - 45.0 MMHG    pO2 (POC) 318 (H) 80 - 100 MMHG    HCO3 (POC) 19.6 (L) 22 - 26 MMOL/L    sO2 (POC) 99.9 (H) 92 - 97 %    Base deficit (POC) 3.8 mmol/L    Mode BILEVEL      Pressure support 10 cmH2O    Allens test (POC) Positive      Site LEFT RADIAL      Specimen type (POC) ARTERIAL      Performed by [facility]    CBC WITH AUTOMATED DIFF    Collection Time: 09/13/22  3:57 AM   Result Value Ref Range    WBC 5.9 4.6 - 13.2 K/uL    RBC 4.90 4.35 - 5.65 M/uL    HGB 13.4 13.0 - 16.0 g/dL    HCT 41.2 36.0 - 48.0 %    MCV 84.1 78.0 - 100.0 FL    MCH 27.3 24.0 - 34.0 PG    MCHC 32.5 31.0 - 37.0 g/dL    RDW 12.7 11.6 - 14.5 %    PLATELET PENDING K/uL    MPV 11.5 9.2 - 11.8 FL    NRBC 0.0 0  WBC    ABSOLUTE NRBC 0.00 0.00 - 0.01 K/uL    NEUTROPHILS 68 40 - 73 %    LYMPHOCYTES 22 21 - 52 %    MONOCYTES 9 3 - 10 %    EOSINOPHILS 0 0 - 5 %    BASOPHILS 0 0 - 2 %    IMMATURE GRANULOCYTES 0 0.0 - 0.5 %    ABS. NEUTROPHILS 4.0 1.8 - 8.0 K/UL    ABS. LYMPHOCYTES 1.3 0.9 - 3.6 K/UL    ABS. MONOCYTES 0.6 0.05 - 1.2 K/UL    ABS. EOSINOPHILS 0.0 0.0 - 0.4 K/UL    ABS. BASOPHILS 0.0 0.0 - 0.1 K/UL    ABS. IMM. GRANS. 0.0 0.00 - 0.04 K/UL    DF AUTOMATED         Radiologic Studies -   XR CHEST PORT    (Results Pending)         Medical Decision Making   I am the first provider for this patient. I reviewed the vital signs, available nursing notes, past medical history, past surgical history, family history and social history. Vital Signs-Reviewed the patient's vital signs. EKG: Sinus rhythm with frequent PVCs.   T waves inversions precordial leads    Records Reviewed: Nursing Notes, Old Medical Records, Previous electrocardiograms, Previous Radiology Studies, and Previous Laboratory Studies (Time of Review: 2:50 AM)    ED Course: Progress Notes, Reevaluation, and Consults:         Provider Notes (Medical Decision Making):   MDM  Number of Diagnoses or Management Options  Diagnosis management comments: 59-year-old male presenting to the emergency department in respiratory distress. Patient diaphoretic, tachycardic in the 150s, tachypneic in the 40s. He was placed immediately on BiPAP. Rales were heard on auscultation all throughout his lung fields. He was hypertensive with systolics in the 477I. Improved with BiPAP. Was given sublingual nitro and 80 mg of Lasix with improvement of his blood pressure. Patient's blood work is notable for elevated troponin 500 and elevated BNP of 12,000. Patient started on heparin drip. Has been given aspirin. Patient case discussed with cardiology. Patient case discussed with hospitalist.  Patient admitted for ongoing monitoring and management           Critical Care Time:  The services I provided to this patient were to treat and/or prevent clinically significant deterioration that could result in the failure of one or more body systems and/or organ systems due to respiratory distress, acute CHF exacerbation, flash pulmonary edema. .    Services included the following:  -reviewing nursing notes and old charts  -vital sign assessments  -direct patient care  -medication orders and management  -interpreting and reviewing diagnostic studies/labs  -re-evaluations  -documentation time    Aggregate critical care time was 52 minutes, which includes only time during which I was engaged in work directly related to the patient's care as described above, whether I was at bedside or elsewhere in the Emergency Department. It did not include time spent performing other reported procedures or the services of residents, students, nurses, or advance practice providers. Walker Ours DO    4:32 AM        Diagnosis     Clinical Impression:   1. Respiratory distress    2. Flash pulmonary edema (HCC)    3.  Acute on chronic congestive heart failure, unspecified heart failure type ([facility] [address]        Disposition: admitted, stepdown    Follow-up Information    None          Patient's Medications   Start Taking    No medications on file   Continue Taking    ACETAMINOPHEN (TYLENOL) 325 MG TABLET    Take 2 Tabs by mouth every four (4) hours as needed for Pain. LIDOCAINE (XYLOCAINE) 4 % TOPICAL CREAM    Apply  to affected area three (3) times daily as needed for Pain. METHOCARBAMOL (ROBAXIN) 500 MG TABLET    Take 1 Tab by mouth three (3) times daily. Indications: Muscle Spasm   These Medications have changed    No medications on file   Stop Taking    No medications on file     Disclaimer: Sections of this note are dictated using utilizing voice recognition software. Minor typographical errors may be present. If questions arise, please do not hesitate to contact me or call our department.

## 2022-09-13 NOTE — ED NOTES
.4. As per [facility] rate will remain 14/units/kg/hr and next PTT will be collected 9/14/22 with AM labs.

## 2022-09-14 VITALS
DIASTOLIC BLOOD PRESSURE: 86 MMHG | RESPIRATION RATE: 17 BRPM | HEART RATE: 65 BPM | OXYGEN SATURATION: 99 % | HEIGHT: 68 IN | BODY MASS INDEX: 23.66 KG/M2 | SYSTOLIC BLOOD PRESSURE: 131 MMHG | TEMPERATURE: 97.6 F | WEIGHT: 156.1 LBS

## 2022-09-14 LAB
ANION GAP SERPL CALC-SCNC: 7 MMOL/L (ref 3–18)
APTT PPP: 56.9 SEC (ref 23–36.4)
APTT PPP: 96.5 SEC (ref 23–36.4)
BASOPHILS # BLD: 0 K/UL (ref 0–0.1)
BASOPHILS NFR BLD: 0 % (ref 0–2)
BUN SERPL-MCNC: 18 MG/DL (ref 7–18)
BUN/CREAT SERPL: 13 (ref 12–20)
CALCIUM SERPL-MCNC: 8.9 MG/DL (ref 8.5–10.1)
CHLORIDE SERPL-SCNC: 102 MMOL/L (ref 100–111)
CO2 SERPL-SCNC: 28 MMOL/L (ref 21–32)
CREAT SERPL-MCNC: 1.44 MG/DL (ref 0.6–1.3)
DIFFERENTIAL METHOD BLD: ABNORMAL
EOSINOPHIL # BLD: 0 K/UL (ref 0–0.4)
EOSINOPHIL NFR BLD: 1 % (ref 0–5)
ERYTHROCYTE [DISTWIDTH] IN BLOOD BY AUTOMATED COUNT: 12.5 % (ref 11.6–14.5)
GLUCOSE SERPL-MCNC: 127 MG/DL (ref 74–99)
HCT VFR BLD AUTO: 42.1 % (ref 36–48)
HGB BLD-MCNC: 14.2 G/DL (ref 13–16)
IMM GRANULOCYTES # BLD AUTO: 0 K/UL (ref 0–0.04)
IMM GRANULOCYTES NFR BLD AUTO: 0 % (ref 0–0.5)
LYMPHOCYTES # BLD: 2.5 K/UL (ref 0.9–3.6)
LYMPHOCYTES NFR BLD: 48 % (ref 21–52)
MAGNESIUM SERPL-MCNC: 1.5 MG/DL (ref 1.6–2.6)
MCH RBC QN AUTO: 28.1 PG (ref 24–34)
MCHC RBC AUTO-ENTMCNC: 33.7 G/DL (ref 31–37)
MCV RBC AUTO: 83.4 FL (ref 78–100)
MONOCYTES # BLD: 0.7 K/UL (ref 0.05–1.2)
MONOCYTES NFR BLD: 13 % (ref 3–10)
NEUTS SEG # BLD: 2 K/UL (ref 1.8–8)
NEUTS SEG NFR BLD: 38 % (ref 40–73)
NRBC # BLD: 0 K/UL (ref 0–0.01)
NRBC BLD-RTO: 0 PER 100 WBC
PLATELET # BLD AUTO: 198 K/UL (ref 135–420)
PMV BLD AUTO: 12 FL (ref 9.2–11.8)
POTASSIUM SERPL-SCNC: 3.6 MMOL/L (ref 3.5–5.5)
RBC # BLD AUTO: 5.05 M/UL (ref 4.35–5.65)
SODIUM SERPL-SCNC: 137 MMOL/L (ref 136–145)
WBC # BLD AUTO: 5.1 K/UL (ref 4.6–13.2)

## 2022-09-14 PROCEDURE — 74011250636 HC RX REV CODE- 250/636

## 2022-09-14 PROCEDURE — 85025 COMPLETE CBC W/AUTO DIFF WBC: CPT

## 2022-09-14 PROCEDURE — 74011250637 HC RX REV CODE- 250/637: Performed by: PHYSICIAN ASSISTANT

## 2022-09-14 PROCEDURE — 85730 THROMBOPLASTIN TIME PARTIAL: CPT

## 2022-09-14 PROCEDURE — 74011250636 HC RX REV CODE- 250/636: Performed by: STUDENT IN AN ORGANIZED HEALTH CARE EDUCATION/TRAINING PROGRAM

## 2022-09-14 PROCEDURE — 99232 SBSQ HOSP IP/OBS MODERATE 35: CPT | Performed by: INTERNAL MEDICINE

## 2022-09-14 PROCEDURE — 74011250637 HC RX REV CODE- 250/637: Performed by: HOSPITALIST

## 2022-09-14 PROCEDURE — 80048 BASIC METABOLIC PNL TOTAL CA: CPT

## 2022-09-14 PROCEDURE — 74011250637 HC RX REV CODE- 250/637: Performed by: INTERNAL MEDICINE

## 2022-09-14 PROCEDURE — 36415 COLL VENOUS BLD VENIPUNCTURE: CPT

## 2022-09-14 PROCEDURE — 83735 ASSAY OF MAGNESIUM: CPT

## 2022-09-14 PROCEDURE — 77010033678 HC OXYGEN DAILY

## 2022-09-14 PROCEDURE — 99239 HOSP IP/OBS DSCHRG MGMT >30: CPT | Performed by: STUDENT IN AN ORGANIZED HEALTH CARE EDUCATION/TRAINING PROGRAM

## 2022-09-14 PROCEDURE — 2709999900 HC NON-CHARGEABLE SUPPLY

## 2022-09-14 PROCEDURE — 94761 N-INVAS EAR/PLS OXIMETRY MLT: CPT

## 2022-09-14 PROCEDURE — 74011250636 HC RX REV CODE- 250/636: Performed by: PHYSICIAN ASSISTANT

## 2022-09-14 PROCEDURE — 74011000250 HC RX REV CODE- 250: Performed by: HOSPITALIST

## 2022-09-14 RX ORDER — FUROSEMIDE 20 MG/1
20 TABLET ORAL DAILY
Status: DISCONTINUED | OUTPATIENT
Start: 2022-09-15 | End: 2022-09-14 | Stop reason: HOSPADM

## 2022-09-14 RX ORDER — CARVEDILOL 3.12 MG/1
3.12 TABLET ORAL EVERY 12 HOURS
Qty: 180 TABLET | Refills: 3 | Status: SHIPPED | OUTPATIENT
Start: 2022-09-14

## 2022-09-14 RX ORDER — HEPARIN SODIUM 1000 [USP'U]/ML
3000 INJECTION, SOLUTION INTRAVENOUS; SUBCUTANEOUS ONCE
Status: COMPLETED | OUTPATIENT
Start: 2022-09-14 | End: 2022-09-14

## 2022-09-14 RX ORDER — ATORVASTATIN CALCIUM 40 MG/1
40 TABLET, FILM COATED ORAL
Qty: 90 TABLET | Refills: 3 | Status: SHIPPED | OUTPATIENT
Start: 2022-09-14

## 2022-09-14 RX ORDER — ISOSORBIDE MONONITRATE 30 MG/1
30 TABLET, EXTENDED RELEASE ORAL DAILY
Qty: 90 TABLET | Refills: 3 | Status: SHIPPED | OUTPATIENT
Start: 2022-09-15

## 2022-09-14 RX ORDER — FUROSEMIDE 20 MG/1
20 TABLET ORAL DAILY
Qty: 90 TABLET | Refills: 3 | Status: SHIPPED | OUTPATIENT
Start: 2022-09-14

## 2022-09-14 RX ORDER — CARVEDILOL 3.12 MG/1
3.12 TABLET ORAL EVERY 12 HOURS
Status: DISCONTINUED | OUTPATIENT
Start: 2022-09-14 | End: 2022-09-14 | Stop reason: HOSPADM

## 2022-09-14 RX ORDER — HEPARIN SODIUM 1000 [USP'U]/ML
INJECTION, SOLUTION INTRAVENOUS; SUBCUTANEOUS
Status: COMPLETED
Start: 2022-09-14 | End: 2022-09-14

## 2022-09-14 RX ORDER — ISOSORBIDE MONONITRATE 30 MG/1
30 TABLET, EXTENDED RELEASE ORAL DAILY
Status: DISCONTINUED | OUTPATIENT
Start: 2022-09-14 | End: 2022-09-14 | Stop reason: HOSPADM

## 2022-09-14 RX ADMIN — HEPARIN SODIUM 14 UNITS/KG/HR: 10000 INJECTION, SOLUTION INTRAVENOUS at 07:06

## 2022-09-14 RX ADMIN — CARVEDILOL 3.12 MG: 3.12 TABLET, FILM COATED ORAL at 13:50

## 2022-09-14 RX ADMIN — HEPARIN SODIUM 16 UNITS/KG/HR: 10000 INJECTION, SOLUTION INTRAVENOUS at 07:38

## 2022-09-14 RX ADMIN — ASPIRIN 81 MG CHEWABLE TABLET 81 MG: 81 TABLET CHEWABLE at 09:18

## 2022-09-14 RX ADMIN — AMLODIPINE BESYLATE 10 MG: 10 TABLET ORAL at 09:17

## 2022-09-14 RX ADMIN — FUROSEMIDE 40 MG: 10 INJECTION, SOLUTION INTRAMUSCULAR; INTRAVENOUS at 09:17

## 2022-09-14 RX ADMIN — SODIUM CHLORIDE, PRESERVATIVE FREE 10 ML: 5 INJECTION INTRAVENOUS at 05:26

## 2022-09-14 RX ADMIN — HEPARIN SODIUM 3000 UNITS: 1000 INJECTION INTRAVENOUS; SUBCUTANEOUS at 07:39

## 2022-09-14 RX ADMIN — HEPARIN SODIUM 3000 UNITS: 1000 INJECTION, SOLUTION INTRAVENOUS; SUBCUTANEOUS at 07:39

## 2022-09-14 RX ADMIN — ISOSORBIDE MONONITRATE 30 MG: 30 TABLET, EXTENDED RELEASE ORAL at 13:50

## 2022-09-14 RX ADMIN — NITROGLYCERIN 1 INCH: 20 OINTMENT TOPICAL at 09:18

## 2022-09-14 RX ADMIN — FUROSEMIDE 40 MG: 10 INJECTION, SOLUTION INTRAMUSCULAR; INTRAVENOUS at 17:29

## 2022-09-14 RX ADMIN — SODIUM CHLORIDE, PRESERVATIVE FREE 10 ML: 5 INJECTION INTRAVENOUS at 13:51

## 2022-09-14 NOTE — DISCHARGE SUMMARY
Discharge Summary    Patient: Anahy Lagunas MRN: [id number]  CSN: [id number]    YOB: 1960  Age: 57 y.o. Sex: male    DOA: 9/13/2022 LOS:  LOS: 1 day   Discharge Date:      Admission Diagnosis: Respiratory distress [R06.03]  CHF exacerbation ([facility] [address] [I50.9]    Discharge Diagnosis:    Heart failure with reduced ejection fraction  Acute decompensated heart failure  Hypertension  Cocaine use  Tobacco use    Discharge Condition: Stable    PHYSICAL EXAM  Visit Vitals  /79   Pulse 61   Temp 97.7 °F (36.5 °C)   Resp 18   Ht 5' 8\" (1.727 m)   Wt 70.8 kg (156 lb 1.6 oz)   SpO2 99%   BMI 23.73 kg/m²       General: Alert, cooperative, no acute distress    HEENT: NC, Atraumatic. PERRLA, EOMI. Anicteric sclerae. Lungs:  CTA Bilaterally. Mild crackles in bases  Heart:  Regular  rhythm,  No murmur, No Rubs, No Gallops  Abdomen: Soft, Non distended, Non tender. +Bowel sounds, no HSM  Extremities: No c/c/e  Psych:   Good insight. Not anxious or agitated. Neurologic:  CN 2-12 grossly intact, oriented X 3. No acute neurological                                 Deficits,     Hospital Course:   Mr Felicia Salter is a 64yo M with history of hypertension and cocaine use who presented to the ER with shortness of breath found to have acute hypoxic respiratory failure requiring BiPAP. Work-up was notable for hypervolemia 2/2 new diagnosis of heart failure with reduced ejection (EF 25-30%). UDS was positive for cocaine. Patient was diuresed with significant improvement in respiratory status. He was initiated on entresto, carvedilol and lasix 20mg with counseling for cocaine and tobacco cessation as well as appropriate cardiac follow-up. He was also seen by nutrition. He had a mildly elevated troponin (325->795) with nonspecific ECG changes on admission and was started on heparin drip, nitro, statin however after evaluation by cardiology there was low suspicion for ACS and this was stopped.  Patient was sent home on ASA, statin and imdur. Consults:   Cardiology, Dr Archie Butler Studies:   09/13/22    ECHO ADULT COMPLETE 09/13/2022 9/13/2022    Interpretation Summary  Formatting of this result is different from the original.      Contrast used: Definity. Left Ventricle: Severely reduced left ventricular systolic function with a visually estimated EF of 25 - 30%. Left ventricle is dilated. Mild septal thickening. Moderate posterior thickening. Septal motion is consistent with bundle branch block. Global hypokinesis present. Grade II diastolic dysfunction with increased LAP. Right Ventricle: Right ventricle size is normal. Reduced systolic function. TAPSE is abnormal. TAPSE is 1.4 cm. RV Peak S' is 7 cm/s. Aorta: Dilated aortic root. Ao Root diameter is 3.6 cm. Signed by: Cali Stuart MD on 9/13/2022  4:40 PM      Discharge Medications:     Current Discharge Medication List        START taking these medications    Details   atorvastatin (LIPITOR) 40 mg tablet Take 1 Tablet by mouth nightly. Qty: 90 Tablet, Refills: 3      carvediloL (COREG) 3.125 mg tablet Take 1 Tablet by mouth every twelve (12) hours. Qty: 180 Tablet, Refills: 3      furosemide (LASIX) 20 mg tablet Take 1 Tablet by mouth daily. Qty: 90 Tablet, Refills: 3      isosorbide mononitrate ER (IMDUR) 30 mg tablet Take 1 Tablet by mouth daily. Qty: 90 Tablet, Refills: 3      sacubitril-valsartan (ENTRESTO) 24 mg/26 mg tablet Take 1 Tablet by mouth every twelve (12) hours. Qty: 180 Tablet, Refills: 3           CONTINUE these medications which have NOT CHANGED    Details   aspirin 81 mg chewable tablet Take 81 mg by mouth daily. acetaminophen (TYLENOL) 325 mg tablet Take 2 Tabs by mouth every four (4) hours as needed for Pain.   Qty: 20 Tab, Refills: 0           STOP taking these medications       lidocaine (XYLOCAINE) 4 % topical cream Comments:   Reason for Stopping:         methocarbamol (ROBAXIN) 500 mg tablet Comments:   Reason for Stopping:               Activity: activity as tolerated    Diet: Cardiac Diet    Wound Care: None needed    Follow-up: with PCP in 7-10days    Minutes spent on discharge: >30 minutes spent coordinating this discharge (review instructions/follow-up, prescriptions, preparing report for sign off)

## 2022-09-14 NOTE — PROGRESS NOTES
Cardiology Progress Note    Admit Date: 9/13/2022  Attending Cardiologist: Dr. Neil Oh     Assessment:     -Acute respiratory distress, in setting of below, s/p bipap. -A/c HFrEF. Echo (01/2021) with normal LVEF and mild diastolic dysfuntion. -HAROON. -Cardiomyopathy, new dx this admission. LVEF 25-30% by Echo this admission.   -Indeterminate troponin, likely demand in setting of hypertension, active cocaine abuse, HAROON and CHF. Initial ECG poor quality, repeat pending. Low risk NST 01/2021   -HTN, uncontrolled. Non compliant with BP medications.   -active tobacco abuse  -Cocaine abuse, UDS + this admission. Reports using cocaine EOD for the last few weeks. No Primary cardiologist, consult by Dr. Edita Daigle:       I saw, evaluated, interviewed and examined the patient personally. Patient denies any chest pain. Dyspnea has improved significantly. Minimal Rales at bases but no significant fluid overload on exam.  Hemodynamically stable  Doubt true ACS. Elevated troponin is likely because of LV dysfunction and acute CHF exacerbation  Admits ongoing cocaine abuse almost every other day  Discussed with the patient that he needs to stop doing cocaine as he has a significant LV dysfunction because of cocaine use and hypertension  Continue aspirin. Okay to stop IV heparin. Will stop Nitropaste and start Imdur 30 mg daily first dose today  Agree to stop IV Lasix and change to oral tomorrow. Continue Entresto and coreg  Compliance with medication advised. Plan to try to start appropriate guideline directed medical therapy as outpatient and if no improvement in EF after tobacco and cocaine cessation and medical management, coronary evaluation can be considered. Patient appears very motivated to stop substance abuse after finding out that he hs severe LV dysfunction.     Significant time spent in reviewing the case, multiple EMR databases, physician notes, reviewing pertinent labs and imaging studies  I spent significant amount of time for medical decision making and updated history, and other providers assessments as well. I personally agree with the findings as stated and the plan as documented. I saw, examined, and evaluated this patient and performed the substantive portion of the encounter for > 50% of the time including extensive history, physical exam, assessment and plan    Jeronimo Serna MD       Continue pulse IV diuretics for another 24 hrs. Plan to transition to maintenance diuretics tomorrow. Increase activity and wean O2. D/c Amlodipine and start Entresto starting tomorrow,  once transitioned to maintenance diuretics. Will d/c IV Heparin gtt. Continue ASA, statin   Echo results d/w patient at length. Lifestyle modifications strongly encouraged. Plan to repeat outpatient Echo in 3-4 months after optimal GDMT. If no improvement in LVEF, will consider further cardiac workup. Subjective:     No new complaints. SOB improved.      Objective:      Patient Vitals for the past 8 hrs:   Temp Pulse Resp BP SpO2   09/14/22 0807 -- -- -- (!) 146/103 --   09/14/22 0806 97.4 °F (36.3 °C) 61 18 (!) 145/111 99 %   09/14/22 0358 98 °F (36.7 °C) (!) 54 16 122/84 98 %         Patient Vitals for the past 96 hrs:   Weight   09/13/22 2020 70.8 kg (156 lb 1.6 oz)   09/13/22 1420 74.8 kg (165 lb)   09/13/22 0215 74.8 kg (165 lb)       TELE: normal sinus rhythm               Current Facility-Administered Medications   Medication Dose Route Frequency Last Admin    heparin (porcine) 25,000 units in 0.45% saline 250 ml infusion  12-25 Units/kg/hr IntraVENous TITRATE 16 Units/kg/hr at 09/14/22 0738    aspirin chewable tablet 81 mg  81 mg Oral DAILY 81 mg at 09/14/22 0918    atorvastatin (LIPITOR) tablet 40 mg  40 mg Oral QHS 40 mg at 09/13/22 2233    sodium chloride (NS) flush 5-40 mL  5-40 mL IntraVENous Q8H 10 mL at 09/14/22 0526    sodium chloride (NS) flush 5-40 mL  5-40 mL IntraVENous PRN 10 mL at 09/13/22 1131    acetaminophen (TYLENOL) tablet 650 mg  650 mg Oral Q6H PRN      Or    acetaminophen (TYLENOL) suppository 650 mg  650 mg Rectal Q6H PRN      polyethylene glycol (MIRALAX) packet 17 g  17 g Oral DAILY PRN      [Held by provider] carvediloL (COREG) tablet 3.125 mg  3.125 mg Oral BID WITH MEALS 3.125 mg at 09/13/22 0845    ondansetron (ZOFRAN) injection 4 mg  4 mg IntraVENous Q6H PRN      nitroglycerin (NITROBID) 2 % ointment 1 Inch  1 Inch Topical BID 1 Inch at 09/14/22 0918    furosemide (LASIX) injection 40 mg  40 mg IntraVENous BID 40 mg at 09/14/22 0917    amLODIPine (NORVASC) tablet 10 mg  10 mg Oral DAILY 10 mg at 09/14/22 0917         Intake/Output Summary (Last 24 hours) at 9/14/2022 0949  Last data filed at 9/14/2022 0905  Gross per 24 hour   Intake 1095.91 ml   Output 3320 ml   Net -2224.09 ml       Physical Exam:  General:  alert, cooperative, no distress, appears stated age  Neck:  no JVD  Lungs:  few bibasilar rales   Heart:  regular rate and rhythm, S1, S2 normal, no murmur, click, rub or gallop  Abdomen:  abdomen is soft without significant tenderness, masses, organomegaly or guarding  Extremities:  extremities normal, atraumatic, no cyanosis or edema    Visit Vitals  BP (!) 146/103   Pulse 61   Temp 97.4 °F (36.3 °C)   Resp 18   Ht 5' 8\" (1.727 m)   Wt 70.8 kg (156 lb 1.6 oz)   SpO2 99%   BMI 23.73 kg/m²       Data Review:     Labs: Results:       Chemistry Recent Labs     09/14/22  0436 09/13/22 0224   * 148*    139   K 3.6 4.0    106   CO2 28 24   BUN 18 14   CREA 1.44* 1.61*   CA 8.9 9.2   AGAP 7 9   BUCR 13 9*      CBC w/Diff Recent Labs     09/14/22  0436 09/13/22  0357 09/13/22 0224   WBC 5.1 5.9 5.8   RBC 5.05 4.90 6.08*   HGB 14.2 13.4 16.9*   HCT 42.1 41.2 51.5*    173 226   GRANS 38* 68 42   LYMPH 48 22 44   EOS 1 0 0      Cardiac Enzymes No results found for: CPK, CK, CKMMB, CKMB, RCK3, CKMBT, CKNDX, CKND1, ANTOINO, TROPT, TROIQ, ROGER, TROPT, TNIPOC, BNP, BNPP   Coagulation Recent Labs     09/14/22  0436 09/13/22  1808   APTT 56.9* 105.4*       Lipid Panel Lab Results   Component Value Date/Time    Cholesterol, total 130 05/17/2010 09:17 AM    HDL Cholesterol 41 05/17/2010 09:17 AM    LDL, calculated 81.2 05/17/2010 09:17 AM    VLDL, calculated 7.8 05/17/2010 09:17 AM    Triglyceride 39 05/17/2010 09:17 AM    CHOL/HDL Ratio 3.2 05/17/2010 09:17 AM      BNP No results found for: BNP, BNPP, XBNPT   Liver Enzymes No results for input(s): TP, ALB, TBIL, AP in the last 72 hours.     No lab exists for component: SGOT, GPT, DBIL   Thyroid Studies Lab Results   Component Value Date/Time    TSH 2.32 05/17/2010 09:17 AM          Signed By: Lillian Koch PA-C     September 14, 2022

## 2022-09-14 NOTE — PROGRESS NOTES
D/C order noted for today. Orders reviewed. No needs identified at this time. CM remains available if needed.      Elijah Chaney, MSW, QMHP

## 2022-09-14 NOTE — ROUTINE PROCESS
1845 TRANSFER - IN REPORT:    Verbal report received from [facility] RN(name) on Court   being received from ED(unit) for routine progression of care      Report consisted of patients Situation, Background, Assessment and   Recommendations(SBAR). Information from the following report(s) SBAR was reviewed with the receiving nurse. Opportunity for questions and clarification was provided. 1910 Bedside and Verbal shift change report given to [facility] RN (oncoming nurse). Report included the following information SBAR. Patient still in ED.

## 2022-09-14 NOTE — PROGRESS NOTES
Patient discharged home by   Discharge instructions reviewed with patient at bedside. Patient o make follow-up in two weeks with PCP/cardio. PIV removed intact. All questions asked and answered.

## 2022-09-14 NOTE — ROUTINE PROCESS
2012: Patient arrived from ER via stretcher. AAOX4. Behavior appropriate to situations. No SOB on oxygen at 4 LPM. On Heparin gtt at 14 units/kg/hr or 10.5 ml/hr. Infusing well to Left FA. Denies any pain or discomfort at this time. Oriented patient to room & surroundings. Call light within reach. Sister at bedside. 2020: v/s taken. Tele box # [id number] appiled on. SB w/ PVC's & ST depression as initial rhythm. TV dinner provided per patient request.     8746: Due meds given. 2304: No change from previous assessment. 0200: Comfortably sleeping.     0358: No change from previous assessment. 5783: Slept good thru night. Needs attended. 0743:Bedside and Verbal shift change report given to Jacquelyn Hernandez (oncoming nurse) by Aelx Leonard RN (offgoing nurse). Report included the following information SBAR, Kardex, Intake/Output, MAR, Recent Results, and Cardiac Rhythm SB/SR w/ multiform PVC's & ST depression . Wound Prevention Checklist    Patient: Colby Schaumann (12 y.o. male)  Date: 9/14/2022  Diagnosis: Respiratory distress [R06.03]  CHF exacerbation ([facility] [I50.9] <principal problem not specified>    Precautions:         []  Heel prevention boots placed on patient    []  Patient turned q2h during shift    []  Lift team ordered    [x]  Patient on Jennifer bed/Specialty bed    []  Each Wound is documented during shift (Stage, Color, drainage, odor, measurements, and dressings)    [x]  Dual skin checks done at bedside during shift report with Analilia Vance RN

## 2022-09-14 NOTE — PROGRESS NOTES
Comprehensive Nutrition Assessment    Type and Reason for Visit: Initial, Positive nutrition screen    Nutrition Recommendations/Plan:   Modify PO diet to 2gm Na Restriction, double portions per pt request.  Order/trial Ensure Enlive (each provides 350 kcal, 20g protein) once daily. Monitor PO intake, compliance of oral supplement, weight, labs and plan of care during admission. Malnutrition Assessment:  Malnutrition Status: At risk for malnutrition (specify) (wt loss) (09/14/22 1218)    Context:  Acute illness       Nutrition History and Allergies: PMHx: HTN, CHF. Wt hx: 165 lb (2018). Pt reports  lb x 1-1.5 months ago, states currently weighing ~160 lb. Reports losing wt \"d/t being on the streets\". Wt loss of 5.8% per pt report. Normally consumes 2-3 meals per day, appetite is off and on and doesn't consume any oral supplements. Nutrition Assessment:    Admitted with respiratory distress and SOB. Positive nutrition screen noted, MST. Eating breakfast, observed ~75% of breakfast consumed thus far. Agreeable to trial oral supplement. Noted mild temple/clavicle wasting. Missing teeth, denies chewing/swallowing issues. Nutrition Related Findings:    Pertinent Meds: lipitor, lasix, heparin gtt  Pertinent Labs: reviewed Wound Type: None    Current Nutrition Intake & Therapies:  Average Meal Intake: %  Average Supplement Intake: None ordered  ADULT DIET Regular; Low Sodium (2 gm); double portions  ADULT ORAL NUTRITION SUPPLEMENT Lunch; Standard High Calorie/High Protein    Anthropometric Measures:  Height: 5' 8\" (172.7 cm)  Ideal Body Weight (IBW): 154 lbs (70 kg)  Admission Body Weight: 156 lb 1.4 oz (70.8 kg)  Current Body Wt:  70.8 kg (156 lb 1.4 oz), 101.4 % IBW. Current BMI (kg/m2): 23.7  BMI Category: Normal weight (BMI 18.5-24. 9)    Estimated Daily Nutrient Needs:  Energy Requirements Based On: Formula  Weight Used for Energy Requirements: Admission  Energy (kcal/day): 6599-7811 kcals/day (MSJ 1.1-1.2)  Weight Used for Protein Requirements: Admission  Protein (g/day): 84-92 g/day (1.2-1.3 g/day)  Method Used for Fluid Requirements: 1 ml/kcal  Fluid (ml/day): 5062-9941 ml/day    Nutrition Diagnosis:   Unintended weight loss related to lack or limited access to food, psychological cause or life stress as evidenced by weight loss greater than or equal to 5% in 1 month    Nutrition Interventions:   Food and/or Nutrient Delivery: Modify current diet, Start oral nutrition supplement     Coordination of Nutrition Care: Continue to monitor while inpatient  Plan of Care discussed with: pt    Goals:     Goals: Meet at least 75% of estimated needs, by next RD assessment       Nutrition Monitoring and Evaluation:      Food/Nutrient Intake Outcomes: Food and nutrient intake, Supplement intake  Physical Signs/Symptoms Outcomes: Biochemical data, Hemodynamic status, Meal time behavior, Weight    Discharge Planning:    Continue current diet    Carlton Marcus [address], [address] [place] [address]   Contact: [phone]

## 2022-09-14 NOTE — PROGRESS NOTES
Problem: Falls - Risk of  Goal: *Absence of Falls  Description: Document Natalio Ascencio Fall Risk and appropriate interventions in the flowsheet.   Outcome: Progressing Towards Goal  Note: Fall Risk Interventions:            Medication Interventions: Bed/chair exit alarm, Patient to call before getting OOB, Teach patient to arise slowly         History of Falls Interventions: Bed/chair exit alarm, Door open when patient unattended         Problem: Pain  Goal: *Control of Pain  Outcome: Progressing Towards Goal     Problem: Heart Failure: Day 1  Goal: Activity/Safety  Outcome: Progressing Towards Goal  Goal: Consults, if ordered  Outcome: Progressing Towards Goal  Goal: Diagnostic Test/Procedures  Outcome: Progressing Towards Goal  Goal: Nutrition/Diet  Outcome: Progressing Towards Goal  Goal: Discharge Planning  Outcome: Progressing Towards Goal  Goal: Medications  Outcome: Progressing Towards Goal  Goal: Respiratory  Outcome: Progressing Towards Goal  Goal: Treatments/Interventions/Procedures  Outcome: Progressing Towards Goal  Goal: Psychosocial  Outcome: Progressing Towards Goal  Goal: *Oxygen saturation within defined limits  Outcome: Progressing Towards Goal  Goal: *Hemodynamically stable  Outcome: Progressing Towards Goal  Goal: *Optimal pain control at patient's stated goal  Outcome: Progressing Towards Goal  Goal: *Anxiety reduced or absent  Outcome: Progressing Towards Goal     Problem: Breathing Pattern - Ineffective  Goal: *Absence of hypoxia  Outcome: Progressing Towards Goal  Goal: *Use of effective breathing techniques  Outcome: Progressing Towards Goal     Problem: Unstable angina/NSTEMI: Day of Admission/Day 1  Goal: Activity/Safety  Outcome: Progressing Towards Goal  Goal: Consults, if ordered  Outcome: Progressing Towards Goal  Goal: Diagnostic Test/Procedures  Outcome: Progressing Towards Goal  Goal: Nutrition/Diet  Outcome: Progressing Towards Goal  Goal: Discharge Planning  Outcome: Progressing Towards Goal  Goal: Medications  Outcome: Progressing Towards Goal  Goal: Respiratory  Outcome: Progressing Towards Goal  Goal: Treatments/Interventions/Procedures  Outcome: Progressing Towards Goal  Goal: Psychosocial  Outcome: Progressing Towards Goal  Goal: *Hemodynamically stable  Outcome: Progressing Towards Goal  Goal: *Optimal pain control at patient's stated goal  Outcome: Progressing Towards Goal  Goal: *Lungs clear or at baseline  Outcome: Progressing Towards Goal     Problem: Patient Education: Go to Patient Education Activity  Goal: Patient/Family Education  Outcome: Progressing Towards Goal Patient presented with multiple pressure injuries.   Left hip DTPI measuring 5 cm x 4 cm.  Left buttock DTP measuring 5cm x 4.5 cm.  Scaral DTPIs, proximal DTPI measuring 1cm x 0.4cm and distal measuring 1 cm x 0.3 cm.   Right hallux DTPI measuring 0.5 cm x 0.4 cm.   The patient requires 1-2 person assist to turn in bed. Z marce pillow being used to turn and reposition the patient in bed. Provided patient with bilateral heel protectors to offload heels from the bed.   The patient's caregiver, Gris was at the bedside. WO instructed Gris and use of z marce pillow and heel protectors and to take them home with the patient to use at home. Jaida verbalized understanding and intent to use the products.   Patient presented with multiple pressure injuries.   Left hip DTPI measuring 5 cm x 4 cm.  Left buttock DTPI measuring 5cm x 4.5 cm.  Sacral DTPIs, proximal DTPI measuring 1cm x 0.4cm and distal DTPI measuring 1 cm x 0.3 cm.   Right hallux DTPI measuring 0.5 cm x 0.4 cm.   The patient requires 1-2 person assist to turn in bed. Z marce pillow being used to turn and reposition the patient in bed. Provided patient with bilateral heel protectors to offload heels from the bed.   The patient's caregiver, Gris was at the bedside. [facility] instructed Gris and use of z marce pillow and heel protectors and to take them home upon discharge for use at home. Jaida verbalized understanding and intent to use the products.   Patient presented with multiple pressure injuries.   Left hip DTPI measuring 5 cm x 4 cm.  Left buttock DTPI measuring 5cm x 4.5 cm.  Sacral DTPIs, proximal DTPI measuring 1cm x 0.4cm and distal DTPI measuring 1 cm x 0.3 cm.   Right hallux DTPI measuring 0.5 cm x 0.4 cm.   The patient requires 1-2 person assist to turn in bed. Z marce pillow being used to turn and reposition the patient in bed. Provided patient with bilateral heel protectors to offload heels from the bed.   The patient's caregiver, Gris was at the bedside. [facility] instructed Gris and use of z marce pillow and heel protectors and to take them home upon discharge for use at home. Gris verbalized understanding and intent to use the products.

## 2022-09-14 NOTE — PROGRESS NOTES
Problem: Falls - Risk of  Goal: *Absence of Falls  Description: Document Dimitry Anthony Fall Risk and appropriate interventions in the flowsheet.   Outcome: Progressing Towards Goal  Note: Fall Risk Interventions:            Medication Interventions: Bed/chair exit alarm, Patient to call before getting OOB, Teach patient to arise slowly         History of Falls Interventions: Bed/chair exit alarm, Door open when patient unattended         Problem: Patient Education: Go to Patient Education Activity  Goal: Patient/Family Education  Outcome: Progressing Towards Goal     Problem: Pain  Goal: *Control of Pain  Outcome: Progressing Towards Goal     Problem: Patient Education: Go to Patient Education Activity  Goal: Patient/Family Education  Outcome: Progressing Towards Goal     Problem: Heart Failure: Day 1  Goal: Activity/Safety  Outcome: Progressing Towards Goal  Goal: Consults, if ordered  Outcome: Progressing Towards Goal  Goal: Diagnostic Test/Procedures  Outcome: Progressing Towards Goal  Goal: Nutrition/Diet  Outcome: Progressing Towards Goal  Goal: Discharge Planning  Outcome: Progressing Towards Goal  Goal: Medications  Outcome: Progressing Towards Goal  Goal: Respiratory  Outcome: Progressing Towards Goal  Goal: Treatments/Interventions/Procedures  Outcome: Progressing Towards Goal  Goal: Psychosocial  Outcome: Progressing Towards Goal  Goal: *Oxygen saturation within defined limits  Outcome: Progressing Towards Goal  Goal: *Hemodynamically stable  Outcome: Progressing Towards Goal  Goal: *Optimal pain control at patient's stated goal  Outcome: Progressing Towards Goal  Goal: *Anxiety reduced or absent  Outcome: Progressing Towards Goal     Problem: Breathing Pattern - Ineffective  Goal: *Absence of hypoxia  Outcome: Progressing Towards Goal  Goal: *Use of effective breathing techniques  Outcome: Progressing Towards Goal     Problem: Patient Education: Go to Patient Education Activity  Goal: Patient/Family Education  Outcome: Progressing Towards Goal     Problem: Unstable angina/NSTEMI: Day of Admission/Day 1  Goal: Activity/Safety  Outcome: Progressing Towards Goal  Goal: Consults, if ordered  Outcome: Progressing Towards Goal  Goal: Diagnostic Test/Procedures  Outcome: Progressing Towards Goal  Goal: Nutrition/Diet  Outcome: Progressing Towards Goal  Goal: Discharge Planning  Outcome: Progressing Towards Goal  Goal: Medications  Outcome: Progressing Towards Goal  Goal: Respiratory  Outcome: Progressing Towards Goal  Goal: Treatments/Interventions/Procedures  Outcome: Progressing Towards Goal  Goal: Psychosocial  Outcome: Progressing Towards Goal  Goal: *Hemodynamically stable  Outcome: Progressing Towards Goal  Goal: *Optimal pain control at patient's stated goal  Outcome: Progressing Towards Goal  Goal: *Lungs clear or at baseline  Outcome: Progressing Towards Goal

## 2022-09-14 NOTE — PROGRESS NOTES
0730 Bedside shift change report given to Yelitza Rainey (oncoming nurse) by Randal Murillo RN (offgoing nurse). Report included the following information SBAR, Kardex, Intake/Output, MAR, and Cardiac Rhythm SR/SB .

## 2022-09-20 NOTE — PROGRESS NOTES
Physician Progress Note      PATIENT:               Maria Ines Prather  CSN #:                  [id number]  :                       1960  ADMIT DATE:       2022 2:13 AM  DISCH DATE:        2022 7:00 PM  RESPONDING  PROVIDER #:        Mabel Gaucher HALL DO          QUERY TEXT:    Patient admitted for acute CHF exacerbation in progress note it is mentioned as Indeterminate troponin, likely demand in setting of hypertension, active cocaine abuse, HAROON and CHF. If possible, please document in progress notes and discharge summary the etiology of Acute CHF, if able to be determined. The medical record reflects the following:  Risk Factors:  57-year-old male with history of hypertension presenting to the emergency department for evaluation of acute respiratory distress. Clinical Indicators: ED Triage - Arrived ambulatory with unsteady gait to ED main entrance with complaints of respiratory distress and audible wheezing. Admits to cocaine use earlier this morning. Diaphoretic with tachycardia and tachypnea. Remains alert.  Cardiology - Elevated troponin is likely because of LV dysfunction and acute CHF exacerbation  Admits ongoing cocaine abuse almost every other day; Agree to stop IV Lasix and change to oral tomorrow-Cocaine abuse, UDS + this admission. Reports using cocaine EOD for the last few weeks.    Cardiology - Discussed with the patient that he needs to stop doing cocaine as he has a significant LV dysfunction because of cocaine use and hypertension  Treatment: Heparin gtt, aspirin, nitropaste, IV Lasix    Thank you,  Mary Win RN, [email or web address]  .  Options provided:  -- Acute CHF due to Cocaine abuse  -- Acute CHF not due to Cocaine abuse  -- Other - I will add my own diagnosis  -- Disagree - Not applicable / Not valid  -- Disagree - Clinically unable to determine / Unknown  -- Refer to Clinical Documentation Reviewer    PROVIDER RESPONSE TEXT:    This patient has Acute CHF not due to Cocaine abuse.     Query created by: Dee Massey on 9/19/2022 9:58 AM      Electronically signed by:  April Sen DO 9/20/2022 5:50 AM

## 2022-12-27 ENCOUNTER — HOSPITAL ENCOUNTER (EMERGENCY)
Age: 62
Discharge: HOME | End: 2022-12-27
Attending: EMERGENCY MEDICINE
Payer: MEDICAID

## 2022-12-27 ENCOUNTER — APPOINTMENT (OUTPATIENT)
Dept: GENERAL RADIOLOGY | Age: 62
End: 2022-12-27
Attending: EMERGENCY MEDICINE
Payer: MEDICAID

## 2022-12-27 VITALS
RESPIRATION RATE: 22 BRPM | SYSTOLIC BLOOD PRESSURE: 174 MMHG | WEIGHT: 155 LBS | HEART RATE: 87 BPM | TEMPERATURE: 98.7 F | OXYGEN SATURATION: 98 % | HEIGHT: 68 IN | DIASTOLIC BLOOD PRESSURE: 112 MMHG | BODY MASS INDEX: 23.49 KG/M2

## 2022-12-27 DIAGNOSIS — I50.9 ACUTE ON CHRONIC CONGESTIVE HEART FAILURE, UNSPECIFIED HEART FAILURE TYPE (HCC): Primary | ICD-10-CM

## 2022-12-27 LAB
ALBUMIN SERPL-MCNC: 3.3 G/DL (ref 3.4–5)
ALBUMIN/GLOB SERPL: 0.7 {RATIO} (ref 0.8–1.7)
ALP SERPL-CCNC: 74 U/L (ref 45–117)
ALT SERPL-CCNC: 37 U/L (ref 16–61)
ANION GAP SERPL CALC-SCNC: 2 MMOL/L (ref 3–18)
AST SERPL-CCNC: 33 U/L (ref 10–38)
ATRIAL RATE: 97 BPM
BASOPHILS # BLD: 0 K/UL (ref 0–0.1)
BASOPHILS NFR BLD: 0 % (ref 0–2)
BILIRUB SERPL-MCNC: 0.7 MG/DL (ref 0.2–1)
BNP SERPL-MCNC: ABNORMAL PG/ML (ref 0–900)
BUN SERPL-MCNC: 15 MG/DL (ref 7–18)
BUN/CREAT SERPL: 10 (ref 12–20)
CALCIUM SERPL-MCNC: 9.1 MG/DL (ref 8.5–10.1)
CALCULATED P AXIS, ECG09: 70 DEGREES
CALCULATED R AXIS, ECG10: 28 DEGREES
CALCULATED T AXIS, ECG11: 101 DEGREES
CHLORIDE SERPL-SCNC: 103 MMOL/L (ref 100–111)
CO2 SERPL-SCNC: 31 MMOL/L (ref 21–32)
CREAT SERPL-MCNC: 1.45 MG/DL (ref 0.6–1.3)
DIAGNOSIS, 93000: NORMAL
DIFFERENTIAL METHOD BLD: ABNORMAL
EOSINOPHIL # BLD: 0 K/UL (ref 0–0.4)
EOSINOPHIL NFR BLD: 0 % (ref 0–5)
ERYTHROCYTE [DISTWIDTH] IN BLOOD BY AUTOMATED COUNT: 11.9 % (ref 11.6–14.5)
GLOBULIN SER CALC-MCNC: 4.6 G/DL (ref 2–4)
GLUCOSE SERPL-MCNC: 143 MG/DL (ref 74–99)
HCT VFR BLD AUTO: 40.3 % (ref 36–48)
HGB BLD-MCNC: 13.7 G/DL (ref 13–16)
IMM GRANULOCYTES # BLD AUTO: 0 K/UL (ref 0–0.04)
IMM GRANULOCYTES NFR BLD AUTO: 0 % (ref 0–0.5)
LYMPHOCYTES # BLD: 0.9 K/UL (ref 0.9–3.6)
LYMPHOCYTES NFR BLD: 15 % (ref 21–52)
MCH RBC QN AUTO: 28.5 PG (ref 24–34)
MCHC RBC AUTO-ENTMCNC: 34 G/DL (ref 31–37)
MCV RBC AUTO: 83.8 FL (ref 78–100)
MONOCYTES # BLD: 0.7 K/UL (ref 0.05–1.2)
MONOCYTES NFR BLD: 12 % (ref 3–10)
NEUTS SEG # BLD: 4.3 K/UL (ref 1.8–8)
NEUTS SEG NFR BLD: 72 % (ref 40–73)
NRBC # BLD: 0 K/UL (ref 0–0.01)
NRBC BLD-RTO: 0 PER 100 WBC
P-R INTERVAL, ECG05: 158 MS
PLATELET # BLD AUTO: 246 K/UL (ref 135–420)
PMV BLD AUTO: 10.6 FL (ref 9.2–11.8)
POTASSIUM SERPL-SCNC: 3.9 MMOL/L (ref 3.5–5.5)
PROT SERPL-MCNC: 7.9 G/DL (ref 6.4–8.2)
Q-T INTERVAL, ECG07: 188 MS
QRS DURATION, ECG06: 86 MS
QTC CALCULATION (BEZET), ECG08: 238 MS
RBC # BLD AUTO: 4.81 M/UL (ref 4.35–5.65)
SODIUM SERPL-SCNC: 136 MMOL/L (ref 136–145)
TROPONIN-HIGH SENSITIVITY: 374 NG/L (ref 0–78)
TROPONIN-HIGH SENSITIVITY: 444 NG/L (ref 0–78)
VENTRICULAR RATE, ECG03: 97 BPM
WBC # BLD AUTO: 6 K/UL (ref 4.6–13.2)

## 2022-12-27 PROCEDURE — 96374 THER/PROPH/DIAG INJ IV PUSH: CPT

## 2022-12-27 PROCEDURE — 85025 COMPLETE CBC W/AUTO DIFF WBC: CPT

## 2022-12-27 PROCEDURE — 71046 X-RAY EXAM CHEST 2 VIEWS: CPT

## 2022-12-27 PROCEDURE — 93005 ELECTROCARDIOGRAM TRACING: CPT

## 2022-12-27 PROCEDURE — 84484 ASSAY OF TROPONIN QUANT: CPT

## 2022-12-27 PROCEDURE — 80053 COMPREHEN METABOLIC PANEL: CPT

## 2022-12-27 PROCEDURE — 83880 ASSAY OF NATRIURETIC PEPTIDE: CPT

## 2022-12-27 PROCEDURE — 74011250637 HC RX REV CODE- 250/637: Performed by: PHYSICIAN ASSISTANT

## 2022-12-27 PROCEDURE — 74011250636 HC RX REV CODE- 250/636: Performed by: PHYSICIAN ASSISTANT

## 2022-12-27 PROCEDURE — 99285 EMERGENCY DEPT VISIT HI MDM: CPT

## 2022-12-27 RX ORDER — GUAIFENESIN 100 MG/5ML
324 LIQUID (ML) ORAL
Status: COMPLETED | OUTPATIENT
Start: 2022-12-27 | End: 2022-12-27

## 2022-12-27 RX ORDER — FUROSEMIDE 20 MG/1
20 TABLET ORAL DAILY
Qty: 30 TABLET | Refills: 0 | Status: SHIPPED | OUTPATIENT
Start: 2022-12-27

## 2022-12-27 RX ORDER — FUROSEMIDE 10 MG/ML
40 INJECTION INTRAMUSCULAR; INTRAVENOUS
Status: COMPLETED | OUTPATIENT
Start: 2022-12-27 | End: 2022-12-27

## 2022-12-27 RX ADMIN — ASPIRIN 81 MG CHEWABLE TABLET 324 MG: 81 TABLET CHEWABLE at 12:10

## 2022-12-27 RX ADMIN — FUROSEMIDE 40 MG: 10 INJECTION, SOLUTION INTRAMUSCULAR; INTRAVENOUS at 12:11

## 2022-12-27 NOTE — ED PROVIDER NOTES
EMERGENCY DEPARTMENT HISTORY AND PHYSICAL EXAM        Date: 12/27/2022  Patient Name: Landen Amor    History of Presenting Illness     Chief Complaint   Patient presents with    Shortness of Breath       History Provided By: Patient    HPI: Landen Amor, 60 y.o. male PMHx significant for htn, CVA, CHF presents ambulatory to the ED. Patient reports previous history of CHF and was recently admitted a few months ago for exacerbation. Patient has been prescribed fluid pills but reports losing them about 2 weeks ago. Has been taking BP medication as prescribed. Patient reports intermittent worsening shortness of breath over the past few days with orthopnea as well. Denies CP, dizziness. Patient reports symptoms are worse with ambulation. Patient has not recently followed up with cardiologist.  Denies lower leg pain or swelling. Denies alleviating sx, Denies pleuritic chest pain. There are no other complaints, changes, or physical findings at this time. PCP: None    No current facility-administered medications on file prior to encounter. Current Outpatient Medications on File Prior to Encounter   Medication Sig Dispense Refill    atorvastatin (LIPITOR) 40 mg tablet Take 1 Tablet by mouth nightly. 90 Tablet 3    carvediloL (COREG) 3.125 mg tablet Take 1 Tablet by mouth every twelve (12) hours. 180 Tablet 3    isosorbide mononitrate ER (IMDUR) 30 mg tablet Take 1 Tablet by mouth daily. 90 Tablet 3    sacubitril-valsartan (ENTRESTO) 24 mg/26 mg tablet Take 1 Tablet by mouth every twelve (12) hours. 180 Tablet 3    [DISCONTINUED] furosemide (LASIX) 20 mg tablet Take 1 Tablet by mouth daily. 90 Tablet 3    aspirin 81 mg chewable tablet Take 81 mg by mouth daily. acetaminophen (TYLENOL) 325 mg tablet Take 2 Tabs by mouth every four (4) hours as needed for Pain.  20 Tab 0       Past History     Past Medical History:  Past Medical History:   Diagnosis Date    HTN (hypertension)     Hypertension Stroke [facility])     Stroke risk 3051,6302, 2009    pt stated he had a stroke in above dates       Past Surgical History:  Past Surgical History:   Procedure Laterality Date    HX CYST REMOVAL  1993    right front of the forehead    HX HERNIA REPAIR  1993       Family History:  Family History   Problem Relation Age of Onset    Cancer Mother     Diabetes Father     Hypertension Father     Asthma Sister        Social History:  Social History     Tobacco Use    Smoking status: Some Days     Packs/day: 1.00     Types: Cigarettes    Smokeless tobacco: Never   Substance Use Topics    Alcohol use: Yes     Alcohol/week: 2.0 standard drinks     Types: 2 Cans of beer per week     Comment: weekend 12 pk    Drug use: No       Allergies:  No Known Allergies      Review of Systems   Review of Systems   Constitutional:  Negative for chills and fever. HENT:  Negative for facial swelling. Eyes:  Negative for photophobia and visual disturbance. Respiratory:  Positive for shortness of breath. Cardiovascular:  Negative for chest pain. Gastrointestinal:  Negative for abdominal pain, nausea and vomiting. Genitourinary:  Negative for flank pain. Skin:  Negative for color change, pallor, rash and wound. Neurological:  Negative for dizziness, weakness, light-headedness and headaches. All other systems reviewed and are negative. Physical Exam   Physical Exam  Vitals and nursing note reviewed. Constitutional:       General: He is not in acute distress. Appearance: He is well-developed. Comments: Pt in NAD   HENT:      Head: Normocephalic and atraumatic. Eyes:      Conjunctiva/sclera: Conjunctivae normal.   Cardiovascular:      Rate and Rhythm: Normal rate and regular rhythm. Heart sounds: Normal heart sounds. Comments: No lower leg pain or swelling b/l  Pulmonary:      Effort: Pulmonary effort is normal. No respiratory distress. Breath sounds: Normal breath sounds.       Comments: Lungs CTA  Not working to breathe  Abdominal:      General: Bowel sounds are normal. There is no distension. Palpations: Abdomen is soft. Musculoskeletal:         General: Normal range of motion. Skin:     General: Skin is warm. Findings: No rash. Neurological:      Mental Status: He is alert and oriented to person, place, and time. Psychiatric:         Behavior: Behavior normal.       Diagnostic Study Results     Labs -     Recent Results (from the past 12 hour(s))   EKG, 12 LEAD, INITIAL    Collection Time: 12/27/22  8:02 AM   Result Value Ref Range    Ventricular Rate 97 BPM    Atrial Rate 97 BPM    P-R Interval 158 ms    QRS Duration 86 ms    Q-T Interval 188 ms    QTC Calculation (Bezet) 238 ms    Calculated P Axis 70 degrees    Calculated R Axis 28 degrees    Calculated T Axis 101 degrees    Diagnosis       Sinus rhythm with frequent premature ventricular complexes  Minimal voltage criteria for LVH, may be normal variant ( Tonio product )  Septal infarct , age undetermined  Abnormal ECG  When compared with ECG of 13-SEP-2022 03:10,  ST no longer depressed in Anterior leads  Nonspecific T wave abnormality has replaced inverted T waves in Inferior   leads  Nonspecific T wave abnormality has replaced inverted T waves in Lateral leads  QT has shortened     CBC WITH AUTOMATED DIFF    Collection Time: 12/27/22  8:16 AM   Result Value Ref Range    WBC 6.0 4.6 - 13.2 K/uL    RBC 4.81 4.35 - 5.65 M/uL    HGB 13.7 13.0 - 16.0 g/dL    HCT 40.3 36.0 - 48.0 %    MCV 83.8 78.0 - 100.0 FL    MCH 28.5 24.0 - 34.0 PG    MCHC 34.0 31.0 - 37.0 g/dL    RDW 11.9 11.6 - 14.5 %    PLATELET 686 646 - 282 K/uL    MPV 10.6 9.2 - 11.8 FL    NRBC 0.0 0  WBC    ABSOLUTE NRBC 0.00 0.00 - 0.01 K/uL    NEUTROPHILS 72 40 - 73 %    LYMPHOCYTES 15 (L) 21 - 52 %    MONOCYTES 12 (H) 3 - 10 %    EOSINOPHILS 0 0 - 5 %    BASOPHILS 0 0 - 2 %    IMMATURE GRANULOCYTES 0 0.0 - 0.5 %    ABS. NEUTROPHILS 4.3 1.8 - 8.0 K/UL    ABS.  LYMPHOCYTES 0.9 0.9 - 3.6 K/UL    ABS. MONOCYTES 0.7 0.05 - 1.2 K/UL    ABS. EOSINOPHILS 0.0 0.0 - 0.4 K/UL    ABS. BASOPHILS 0.0 0.0 - 0.1 K/UL    ABS. IMM. GRANS. 0.0 0.00 - 0.04 K/UL    DF AUTOMATED     METABOLIC PANEL, COMPREHENSIVE    Collection Time: 12/27/22  8:16 AM   Result Value Ref Range    Sodium 136 136 - 145 mmol/L    Potassium 3.9 3.5 - 5.5 mmol/L    Chloride 103 100 - 111 mmol/L    CO2 31 21 - 32 mmol/L    Anion gap 2 (L) 3.0 - 18 mmol/L    Glucose 143 (H) 74 - 99 mg/dL    BUN 15 7.0 - 18 MG/DL    Creatinine 1.45 (H) 0.6 - 1.3 MG/DL    BUN/Creatinine ratio 10 (L) 12 - 20      eGFR 54 (L) >60 ml/min/1.73m2    Calcium 9.1 8.5 - 10.1 MG/DL    Bilirubin, total 0.7 0.2 - 1.0 MG/DL    ALT (SGPT) 37 16 - 61 U/L    AST (SGOT) 33 10 - 38 U/L    Alk. phosphatase 74 45 - 117 U/L    Protein, total 7.9 6.4 - 8.2 g/dL    Albumin 3.3 (L) 3.4 - 5.0 g/dL    Globulin 4.6 (H) 2.0 - 4.0 g/dL    A-G Ratio 0.7 (L) 0.8 - 1.7     TROPONIN-HIGH SENSITIVITY    Collection Time: 12/27/22  8:16 AM   Result Value Ref Range    Troponin-High Sensitivity 444 (HH) 0 - 78 ng/L   NT-PRO BNP    Collection Time: 12/27/22  8:16 AM   Result Value Ref Range    NT pro-BNP 24,181 (H) 0 - 900 PG/ML   TROPONIN-HIGH SENSITIVITY    Collection Time: 12/27/22  1:50 PM   Result Value Ref Range    Troponin-High Sensitivity 374 (HH) 0 - 78 ng/L       Radiologic Studies -   XR CHEST PA LAT   Final Result   1. No acute infiltrate or effusion. CT Results  (Last 48 hours)      None          CXR Results  (Last 48 hours)                 12/27/22 0822  XR CHEST PA LAT Final result    Impression:  1. No acute infiltrate or effusion. Narrative:  EXAM: Chest Radiographs       INDICATION:  SOB       TECHNIQUE: PA and lateral views of the chest       COMPARISON: 9/13/2022, 5/13/2010, 9/26/2007       FINDINGS: No pneumothorax identified. The lungs are clear. No infiltrates   identified. No effusions appreciated.  The cardiomediastinal silhouette is unremarkable. The pulmonary vascularity is unremarkable. The osseous structures   are unremarkable. Medical Decision Making   I am the first provider for this patient. I reviewed the vital signs, available nursing notes, past medical history, past surgical history, family history and social history. Vital Signs-Reviewed the patient's vital signs. Patient Vitals for the past 12 hrs:   Temp Pulse Resp BP SpO2   12/27/22 1555 -- 87 -- (!) 174/112 98 %   12/27/22 0757 98.7 °F (37.1 °C) (!) 46 22 (!) 201/86 96 %         EKG interpretation: (Preliminary)  Rhythm: normal sinus rhythm; and regular . Rate (approx.): 97; Axis: normal; SD interval: normal; QRS interval: normal ; ST/T wave: non-specific changes; Other findings: normal.    No acute ischemic changes. Records Reviewed: Nursing Notes, Old Medical Records, Previous Radiology Studies, and Previous Laboratory Studies    Provider Notes (Medical Decision Making):   DDx: ACS, CHF exacerbation, PNA    61 yo M who presents with worsening SOB. HX CHF. Has not taken lasix in 2 weeks. On exam no lower leg swelling. CXR shows no signs of fluid overload or infection. Normal SpO2. BNP elevated. Given IV lasix and sx improved. SpO2 remained normal when walking after given lasix. EKG shows no acute ischemic changes. Elevated troponin trending down. Similarly elevated at last visit. Will refill lasix and stressed importance of cardiology follow up At time of discharge, pt non-toxic appearing in NAD. Pt stable for prompt outpatient follow-up with PCP 1 to 2 days. Patient given strict instructions to return if symptoms worsen. ED Course:   Initial assessment performed. The patients presenting problems have been discussed, and they are in agreement with the care plan formulated and outlined with them. I have encouraged them to ask questions as they arise throughout their visit. Ambulated patient. 96% with ambulation.  Pt not working to breathe. Pt report improvement of sx. Disposition:  3:55 PM  Discussed lab and imaging results with pt along with dx and treatment plan. Discussed importance of  PCP and cardiology follow up. All questions answered. Pt voiced they understood. Return if sx worsen. PLAN:  1. Current Discharge Medication List        CONTINUE these medications which have CHANGED    Details   furosemide (LASIX) 20 mg tablet Take 1 Tablet by mouth daily. Qty: 30 Tablet, Refills: 0  Start date: 12/27/2022           2. Follow-up Information       Follow up With Specialties Details Why [address]  Schedule an appointment as soon as possible for a visit in 1 day  Kalin Drake 3  [facility] [address] [facility] LALITOTino Williamson MD Cardiovascular Disease Physician, Internal Medicine Physician Schedule an appointment as soon as possible for a visit in 1 day  [address] [place] [address]  [phone]      [facility] DEPT Emergency Medicine  As needed, If symptoms worsen [address]  [phone]          Return to ED if worse     Diagnosis     Clinical Impression:   1. Acute on chronic congestive heart failure, unspecified heart failure type [facility])        Attestations:    PAUL Hua    Please note that this dictation was completed with [employer], the computer voice recognition software. Quite often unanticipated grammatical, syntax, homophones, and other interpretive errors are inadvertently transcribed by the computer software. Please disregard these errors. Please excuse any errors that have escaped final proofreading. Thank you.

## 2022-12-27 NOTE — ED TRIAGE NOTES
Pt to ED with complaints of SOB that started 3-4 days ago, reports history CHF and Respiratory failure.

## 2023-02-13 ENCOUNTER — OFFICE VISIT (OUTPATIENT)
Age: 63
End: 2023-02-13
Payer: COMMERCIAL

## 2023-02-13 VITALS
OXYGEN SATURATION: 97 % | DIASTOLIC BLOOD PRESSURE: 106 MMHG | WEIGHT: 158 LBS | HEART RATE: 88 BPM | HEIGHT: 68 IN | SYSTOLIC BLOOD PRESSURE: 158 MMHG | BODY MASS INDEX: 23.95 KG/M2

## 2023-02-13 DIAGNOSIS — E78.00 PURE HYPERCHOLESTEROLEMIA: ICD-10-CM

## 2023-02-13 DIAGNOSIS — I10 ESSENTIAL HYPERTENSION WITH GOAL BLOOD PRESSURE LESS THAN 140/90: Primary | ICD-10-CM

## 2023-02-13 DIAGNOSIS — I42.8 OTHER CARDIOMYOPATHY (HCC): ICD-10-CM

## 2023-02-13 PROCEDURE — 99205 OFFICE O/P NEW HI 60 MIN: CPT | Performed by: INTERNAL MEDICINE

## 2023-02-13 PROCEDURE — 3077F SYST BP >= 140 MM HG: CPT | Performed by: INTERNAL MEDICINE

## 2023-02-13 PROCEDURE — 3080F DIAST BP >= 90 MM HG: CPT | Performed by: INTERNAL MEDICINE

## 2023-02-13 RX ORDER — FUROSEMIDE 20 MG/1
20 TABLET ORAL DAILY
Qty: 60 TABLET | Refills: 3 | Status: SHIPPED | OUTPATIENT
Start: 2023-02-13

## 2023-02-13 RX ORDER — CARVEDILOL 3.12 MG/1
3.12 TABLET ORAL EVERY 12 HOURS
Qty: 60 TABLET | Refills: 3 | Status: SHIPPED | OUTPATIENT
Start: 2023-02-13

## 2023-02-13 RX ORDER — ISOSORBIDE MONONITRATE 30 MG/1
30 TABLET, EXTENDED RELEASE ORAL DAILY
Qty: 30 TABLET | Refills: 4 | Status: SHIPPED | OUTPATIENT
Start: 2023-02-13

## 2023-02-13 RX ORDER — ATORVASTATIN CALCIUM 40 MG/1
40 TABLET, FILM COATED ORAL DAILY
Qty: 30 TABLET | Refills: 4 | Status: SHIPPED | OUTPATIENT
Start: 2023-02-13

## 2023-02-13 ASSESSMENT — PATIENT HEALTH QUESTIONNAIRE - PHQ9
SUM OF ALL RESPONSES TO PHQ9 QUESTIONS 1 & 2: 0
SUM OF ALL RESPONSES TO PHQ QUESTIONS 1-9: 0
1. LITTLE INTEREST OR PLEASURE IN DOING THINGS: 0
SUM OF ALL RESPONSES TO PHQ QUESTIONS 1-9: 0
SUM OF ALL RESPONSES TO PHQ QUESTIONS 1-9: 0
2. FEELING DOWN, DEPRESSED OR HOPELESS: 0
SUM OF ALL RESPONSES TO PHQ QUESTIONS 1-9: 0

## 2023-02-13 NOTE — PROGRESS NOTES
[facility]    Racheal Washburn is 60 y.o. male with hypertension, substance abuse, hyperlipidemia, cardiomyopathy and tobacco abuse disorder    Patient is here today for cardiac evaluation  Patient was diagnosed with cardiomyopathy with LVEF 25 -30% in 09/2022  Patient is not seeing any physician from cardiology standpoint. Over the last few months he believes that his shortness of breath is slowly getting worse. He gets out of breath after walking about 3 or 4 blocks. He does not have any significant chest pain or chest tightness. Unfortunately patient continues to smoke cigarettes on a daily basis 5 cigarettes a day. He uses cocaine once or twice a month. He denies any lower extremity swelling. He said he intermittently has been running out of his medication. Denies any nausea, vomiting, abdominal pain, fever, chills, sputum production. No hematuria or other bleeding complaints    Past Medical History:   Diagnosis Date    Cardiomyopathy ([facility] [address]     Cocaine abuse ([facility] [address]     HTN (hypertension)     Hypertension     Stroke [facility])     Stroke risk 0115,4370, 2009    pt stated he had a stroke in above dates       Review of Systems:  Cardiac symptoms as noted above in HPI. All others negative. Denies fatigue, malaise, skin rash, joint pain, blurring vision, photophobia, neck pain, hemoptysis, chronic cough, nausea, vomiting, hematuria, burning micturition, BRBPR, chronic headaches. Current Outpatient Medications   Medication Sig    carvedilol (COREG) 3.125 MG tablet Take 1 tablet by mouth in the morning and 1 tablet in the evening. sacubitril-valsartan (ENTRESTO) 24-26 MG per tablet Take 1 tablet by mouth in the morning and 1 tablet in the evening.     furosemide (LASIX) 20 MG tablet Take 1 tablet by mouth daily    isosorbide mononitrate (IMDUR) 30 MG extended release tablet Take 1 tablet by mouth daily    atorvastatin (LIPITOR) 40 MG tablet Take 1 tablet by mouth daily    acetaminophen (TYLENOL) 325 MG tablet Take 650 mg by mouth every 4 hours as needed    aspirin 81 MG chewable tablet Take 81 mg by mouth daily     No current facility-administered medications for this visit. Past Surgical History:   Procedure Laterality Date    CYST REMOVAL  1993    right front of the forehead    HERNIA REPAIR  1993       Allergies and Sensitivities:  No Known Allergies    Family History:  Family History   Problem Relation Age of Onset    Hypertension Father     Asthma Sister     Cancer Mother     Diabetes Father        Social History:  Social History     Tobacco Use    Smoking status: Some Days     Packs/day: 1.00     Types: Cigarettes    Smokeless tobacco: Never   Substance Use Topics    Alcohol use: Yes     Alcohol/week: 2.0 standard drinks    Drug use: No     He  reports that he has been smoking cigarettes. He has been smoking an average of 1 pack per day. He has never used smokeless tobacco.  He  reports current alcohol use of about 2.0 standard drinks per week. Physical Exam:  BP Readings from Last 3 Encounters:   02/13/23 (!) 158/106   09/14/22 131/86         Pulse Readings from Last 3 Encounters:   02/13/23 88   09/14/22 65          Wt Readings from Last 3 Encounters:   02/13/23 158 lb (71.7 kg)   09/13/22 156 lb 1.6 oz (70.8 kg)       Constitutional: Oriented to person, place, and time. HENT: Head: Normocephalic and atraumatic. Eyes: Conjunctivae and extraocular motions are normal.   Neck: No JVD present. Carotid bruit is not appreciated. Cardiovascular: Regular rhythm. No murmur, gallop or rubs appreciated  Lung: Breath sounds normal. No respiratory distress. No ronchi or rales appreciated  Abdominal: No tenderness. No rebound and no guarding. Musculoskeletal: There is no lower extremity edema. No cynosis  Lymphadenopathy:  No cervical or supraclavicular adenopathy appriciated. Neurological: No gross motor deficit noted.   Skin: No visible skin rash noted. No Ear discharge noted  Psychiatric: Normal mood and affect. LABS:   @  Lab Results   Component Value Date/Time    WBC 6.0 12/27/2022 08:16 AM    HGB 13.7 12/27/2022 08:16 AM    HCT 40.3 12/27/2022 08:16 AM     12/27/2022 08:16 AM    MCV 83.8 12/27/2022 08:16 AM     Lab Results   Component Value Date/Time     12/27/2022 08:16 AM    K 3.9 12/27/2022 08:16 AM     12/27/2022 08:16 AM    CO2 31 12/27/2022 08:16 AM    BUN 15 12/27/2022 08:16 AM     Lipids Latest Ref Rng & Units 12/27/2022   ALT 16 - 61 U/L 37   AST 10 - 38 U/L 33     Lab Results   Component Value Date/Time    ALT 37 12/27/2022 08:16 AM     No results found for: LABA1C  No results found for: TSHFT4, TSH, TSHREFLEX, PIE1XYU    TRANSTHORACIC ECHOCARDIOGRAM (TTE) COMPLETE (CONTRAST/BUBBLE/3D PRN) 09/13/2022  4:34 PM, 09/13/2022 12:00 AM (Final)    Contrast used: Definity. Left Ventricle: Severely reduced left ventricular systolic function with a visually estimated EF of 25 - 30%. Left ventricle is dilated. Mild septal thickening. Moderate posterior thickening. Septal motion is consistent with bundle branch block. Global hypokinesis present. Grade II diastolic dysfunction with increased LAP. Right Ventricle: Right ventricle size is normal. Reduced systolic function. TAPSE is abnormal. TAPSE is 1.4 cm. RV Peak S' is 7 cm/s. Aorta: Dilated aortic root. Ao Root diameter is 3.6 cm. IMPRESSION & PLAN:  Avila Bound  is 60 y.o. male with     Cardiomyopathy:  Echo in 09/2022 with LVEF 25-30%  Intermittently taking medication and sometimes not taking it. He appears to be taking Coreg, Entresto and Lasix and Imdur. I will refill all his medications today. Salt restriction, fluid restriction, compliance with the medication discussed in great detail  Today I had a lengthy discussion with the patient regarding his diagnosis of cardiomyopathy with EF of only 25%.   He tells me that he did not understand the diagnosis before and now he is worried and he is going to work on his lifestyle modification. He will stop using alcohol and cocaine. He claims that he will take his medication regularly  With ongoing dyspnea and significant liver dysfunction, recommend cath to evaluate for underlying CAD  Discussed regarding management strategy which includes medical management vs. Ischemia evaluation ( non-invasive vs. Invasive). Risk, benefit and alternatives of each strategy discussed in detail. Risk, benefit, complication of LHC and possible PCI ( including but not limited to bleeding, vascular trauma requiring surgery,  infection, heart failure, stroke, MI, emergent bypass surgery, severe allergic reactions, kidney failure, dialysis and death ) were discussed with patient and willing to proceed with procedure. Will be using moderate sedation   By stating these are possible risks, this does not exclude the potential for additional risks not named here. Hypertension: /106. Repeat blood pressure 148/96. Currently he is supposed to be taking Imdur, Lasix, Coreg and Entresto. I am refilling all his medications. Hyperlipidemia: Currently on atorvastatin. Continue same. Recommend to obtain fasting lipid profile if not done at PCP office    Cocaine use: Patient used cocaine approximately once a month. I discussed him about his cardiomyopathy and possible cardiovascular disease. He verbalized understanding and he appears to be motivated to quit using cocaine moving forward. He understand the risk of cardiovascular disease especially using cocaine in the setting of carvedilol. Tobacco abuse disorder  Patient is currently smoking 5 cig a day. Use to smoke 0.5 PPD for 40 years. Patient educated for consequences/sequela and risk of smoking including but not limited to CAD/PAD/stroke/COPD/lung cancer/other cancer and death. Patient understood completely and expressed and verbalized understanding.   Today I had a lengthy discussion with the patient for more than 5 minutes discussing about importance of smoking cessation. Counseling was offered. Patient is working towards that goal.  Medications options were discussed and patient will think about it     This plan was discussed with patient who is in agreement. Thank you for allowing me to participate in patient care. Please feel free to call me if you have any question or concern. Codie Wang MD  Please note: This document has been produced using voice recognition software. Unrecognized errors in transcription may be present.

## 2023-03-09 ENCOUNTER — APPOINTMENT (OUTPATIENT)
Facility: HOSPITAL | Age: 63
End: 2023-03-09
Payer: COMMERCIAL

## 2023-03-09 ENCOUNTER — HOSPITAL ENCOUNTER (INPATIENT)
Facility: HOSPITAL | Age: 63
LOS: 1 days | Discharge: HOME | End: 2023-03-10
Attending: EMERGENCY MEDICINE | Admitting: STUDENT IN AN ORGANIZED HEALTH CARE EDUCATION/TRAINING PROGRAM
Payer: COMMERCIAL

## 2023-03-09 DIAGNOSIS — R77.8 ELEVATED TROPONIN: ICD-10-CM

## 2023-03-09 DIAGNOSIS — I50.9 CHF EXACERBATION (HCC): ICD-10-CM

## 2023-03-09 DIAGNOSIS — I50.41 ACUTE COMBINED SYSTOLIC AND DIASTOLIC CHF, NYHA CLASS 2 (HCC): Primary | ICD-10-CM

## 2023-03-09 DIAGNOSIS — I50.9 ACUTE DECOMPENSATED HEART FAILURE (HCC): ICD-10-CM

## 2023-03-09 DIAGNOSIS — F14.10 COCAINE ABUSE (HCC): ICD-10-CM

## 2023-03-09 DIAGNOSIS — I50.43 ACUTE ON CHRONIC HEART FAILURE WITH REDUCED EJECTION FRACTION AND DIASTOLIC DYSFUNCTION (HCC): ICD-10-CM

## 2023-03-09 PROBLEM — E78.5 HYPERLIPIDEMIA: Status: ACTIVE | Noted: 2023-03-09

## 2023-03-09 PROBLEM — R79.89 ELEVATED TROPONIN: Status: ACTIVE | Noted: 2023-03-09

## 2023-03-09 LAB
ALBUMIN SERPL-MCNC: 3.5 G/DL (ref 3.4–5)
ALBUMIN/GLOB SERPL: 0.9 (ref 0.8–1.7)
ALP SERPL-CCNC: 84 U/L (ref 45–117)
ALT SERPL-CCNC: 51 U/L (ref 16–61)
AMPHET UR QL SCN: NEGATIVE
ANION GAP SERPL CALC-SCNC: 6 MMOL/L (ref 3–18)
AST SERPL-CCNC: 46 U/L (ref 10–38)
BARBITURATES UR QL SCN: NEGATIVE
BASOPHILS # BLD: 0 K/UL (ref 0–0.1)
BASOPHILS NFR BLD: 0 % (ref 0–2)
BENZODIAZ UR QL: NEGATIVE
BILIRUB SERPL-MCNC: 0.8 MG/DL (ref 0.2–1)
BUN SERPL-MCNC: 16 MG/DL (ref 7–18)
BUN/CREAT SERPL: 10 (ref 12–20)
CALCIUM SERPL-MCNC: 9 MG/DL (ref 8.5–10.1)
CANNABINOIDS UR QL SCN: NEGATIVE
CHLORIDE SERPL-SCNC: 111 MMOL/L (ref 100–111)
CO2 SERPL-SCNC: 22 MMOL/L (ref 21–32)
COCAINE UR QL SCN: POSITIVE
CREAT SERPL-MCNC: 1.67 MG/DL (ref 0.6–1.3)
DIFFERENTIAL METHOD BLD: ABNORMAL
EKG ATRIAL RATE: 103 BPM
EKG DIAGNOSIS: NORMAL
EKG P AXIS: 70 DEGREES
EKG P-R INTERVAL: 172 MS
EKG Q-T INTERVAL: 372 MS
EKG QRS DURATION: 88 MS
EKG QTC CALCULATION (BAZETT): 487 MS
EKG R AXIS: -28 DEGREES
EKG T AXIS: 83 DEGREES
EKG VENTRICULAR RATE: 103 BPM
EOSINOPHIL # BLD: 0 K/UL (ref 0–0.4)
EOSINOPHIL NFR BLD: 0 % (ref 0–5)
ERYTHROCYTE [DISTWIDTH] IN BLOOD BY AUTOMATED COUNT: 12.6 % (ref 11.6–14.5)
FLUAV RNA SPEC QL NAA+PROBE: NOT DETECTED
FLUBV RNA SPEC QL NAA+PROBE: NOT DETECTED
GLOBULIN SER CALC-MCNC: 3.8 G/DL (ref 2–4)
GLUCOSE SERPL-MCNC: 127 MG/DL (ref 74–99)
HCT VFR BLD AUTO: 38.4 % (ref 36–48)
HGB BLD-MCNC: 12.9 G/DL (ref 13–16)
IMM GRANULOCYTES # BLD AUTO: 0 K/UL (ref 0–0.04)
IMM GRANULOCYTES NFR BLD AUTO: 0 % (ref 0–0.5)
LYMPHOCYTES # BLD: 1.3 K/UL (ref 0.9–3.6)
LYMPHOCYTES NFR BLD: 17 % (ref 21–52)
Lab: ABNORMAL
MAGNESIUM SERPL-MCNC: 1.7 MG/DL (ref 1.6–2.6)
MCH RBC QN AUTO: 28.5 PG (ref 24–34)
MCHC RBC AUTO-ENTMCNC: 33.6 G/DL (ref 31–37)
MCV RBC AUTO: 84.8 FL (ref 78–100)
METHADONE UR QL: NEGATIVE
MONOCYTES # BLD: 0.8 K/UL (ref 0.05–1.2)
MONOCYTES NFR BLD: 10 % (ref 3–10)
NEUTS SEG # BLD: 5.2 K/UL (ref 1.8–8)
NEUTS SEG NFR BLD: 72 % (ref 40–73)
NRBC # BLD: 0 K/UL (ref 0–0.01)
NRBC BLD-RTO: 0 PER 100 WBC
NT PRO BNP: ABNORMAL PG/ML (ref 0–900)
OPIATES UR QL: NEGATIVE
PCP UR QL: NEGATIVE
PLATELET # BLD AUTO: 256 K/UL (ref 135–420)
PMV BLD AUTO: 10.7 FL (ref 9.2–11.8)
POTASSIUM SERPL-SCNC: 4.4 MMOL/L (ref 3.5–5.5)
PROT SERPL-MCNC: 7.3 G/DL (ref 6.4–8.2)
RBC # BLD AUTO: 4.53 M/UL (ref 4.35–5.65)
SARS-COV-2 RNA RESP QL NAA+PROBE: NOT DETECTED
SODIUM SERPL-SCNC: 139 MMOL/L (ref 136–145)
TROPONIN I SERPL HS-MCNC: 351 NG/L (ref 0–78)
TROPONIN I SERPL HS-MCNC: 410 NG/L (ref 0–78)
WBC # BLD AUTO: 7.3 K/UL (ref 4.6–13.2)

## 2023-03-09 PROCEDURE — 6370000000 HC RX 637 (ALT 250 FOR IP)

## 2023-03-09 PROCEDURE — 99285 EMERGENCY DEPT VISIT HI MDM: CPT

## 2023-03-09 PROCEDURE — 84484 ASSAY OF TROPONIN QUANT: CPT

## 2023-03-09 PROCEDURE — 71045 X-RAY EXAM CHEST 1 VIEW: CPT

## 2023-03-09 PROCEDURE — 93010 ELECTROCARDIOGRAM REPORT: CPT | Performed by: INTERNAL MEDICINE

## 2023-03-09 PROCEDURE — 94660 CPAP INITIATION&MGMT: CPT

## 2023-03-09 PROCEDURE — 93005 ELECTROCARDIOGRAM TRACING: CPT | Performed by: EMERGENCY MEDICINE

## 2023-03-09 PROCEDURE — 85025 COMPLETE CBC W/AUTO DIFF WBC: CPT

## 2023-03-09 PROCEDURE — 83880 ASSAY OF NATRIURETIC PEPTIDE: CPT

## 2023-03-09 PROCEDURE — 98960 EDU&TRN PT SELF-MGMT NQHP 1: CPT

## 2023-03-09 PROCEDURE — 6370000000 HC RX 637 (ALT 250 FOR IP): Performed by: EMERGENCY MEDICINE

## 2023-03-09 PROCEDURE — 83735 ASSAY OF MAGNESIUM: CPT

## 2023-03-09 PROCEDURE — 1100000003 HC PRIVATE W/ TELEMETRY

## 2023-03-09 PROCEDURE — 6360000002 HC RX W HCPCS: Performed by: EMERGENCY MEDICINE

## 2023-03-09 PROCEDURE — 6370000000 HC RX 637 (ALT 250 FOR IP): Performed by: INTERNAL MEDICINE

## 2023-03-09 PROCEDURE — 6360000002 HC RX W HCPCS

## 2023-03-09 PROCEDURE — 80307 DRUG TEST PRSMV CHEM ANLYZR: CPT

## 2023-03-09 PROCEDURE — 99222 1ST HOSP IP/OBS MODERATE 55: CPT | Performed by: INTERNAL MEDICINE

## 2023-03-09 PROCEDURE — 5A09357 ASSISTANCE WITH RESPIRATORY VENTILATION, LESS THAN 24 CONSECUTIVE HOURS, CONTINUOUS POSITIVE AIRWAY PRESSURE: ICD-10-PCS | Performed by: STUDENT IN AN ORGANIZED HEALTH CARE EDUCATION/TRAINING PROGRAM

## 2023-03-09 PROCEDURE — 87636 SARSCOV2 & INF A&B AMP PRB: CPT

## 2023-03-09 PROCEDURE — 94761 N-INVAS EAR/PLS OXIMETRY MLT: CPT

## 2023-03-09 PROCEDURE — 80053 COMPREHEN METABOLIC PANEL: CPT

## 2023-03-09 PROCEDURE — 96374 THER/PROPH/DIAG INJ IV PUSH: CPT

## 2023-03-09 PROCEDURE — 2700000000 HC OXYGEN THERAPY PER DAY

## 2023-03-09 PROCEDURE — 2580000003 HC RX 258

## 2023-03-09 RX ORDER — CARVEDILOL 3.12 MG/1
3.12 TABLET ORAL 2 TIMES DAILY WITH MEALS
Status: DISCONTINUED | OUTPATIENT
Start: 2023-03-09 | End: 2023-03-10 | Stop reason: HOSPADM

## 2023-03-09 RX ORDER — ASPIRIN 81 MG/1
81 TABLET, CHEWABLE ORAL DAILY
Status: DISCONTINUED | OUTPATIENT
Start: 2023-03-09 | End: 2023-03-10 | Stop reason: HOSPADM

## 2023-03-09 RX ORDER — SPIRONOLACTONE 25 MG/1
25 TABLET ORAL DAILY
Status: DISCONTINUED | OUTPATIENT
Start: 2023-03-09 | End: 2023-03-10 | Stop reason: HOSPADM

## 2023-03-09 RX ORDER — MAGNESIUM SULFATE IN WATER 40 MG/ML
2000 INJECTION, SOLUTION INTRAVENOUS PRN
Status: DISCONTINUED | OUTPATIENT
Start: 2023-03-09 | End: 2023-03-10 | Stop reason: HOSPADM

## 2023-03-09 RX ORDER — ISOSORBIDE MONONITRATE 30 MG/1
30 TABLET, EXTENDED RELEASE ORAL DAILY
Status: DISCONTINUED | OUTPATIENT
Start: 2023-03-09 | End: 2023-03-10 | Stop reason: HOSPADM

## 2023-03-09 RX ORDER — ACETAMINOPHEN 325 MG/1
650 TABLET ORAL EVERY 6 HOURS PRN
Status: DISCONTINUED | OUTPATIENT
Start: 2023-03-09 | End: 2023-03-10 | Stop reason: HOSPADM

## 2023-03-09 RX ORDER — FUROSEMIDE 10 MG/ML
40 INJECTION INTRAMUSCULAR; INTRAVENOUS 2 TIMES DAILY
Status: DISCONTINUED | OUTPATIENT
Start: 2023-03-09 | End: 2023-03-10

## 2023-03-09 RX ORDER — NITROGLYCERIN 0.4 MG/1
0.4 TABLET SUBLINGUAL EVERY 5 MIN PRN
Status: DISCONTINUED | OUTPATIENT
Start: 2023-03-09 | End: 2023-03-10 | Stop reason: HOSPADM

## 2023-03-09 RX ORDER — ACETAMINOPHEN 650 MG/1
650 SUPPOSITORY RECTAL EVERY 6 HOURS PRN
Status: DISCONTINUED | OUTPATIENT
Start: 2023-03-09 | End: 2023-03-10 | Stop reason: HOSPADM

## 2023-03-09 RX ORDER — SODIUM CHLORIDE 0.9 % (FLUSH) 0.9 %
5-40 SYRINGE (ML) INJECTION PRN
Status: DISCONTINUED | OUTPATIENT
Start: 2023-03-09 | End: 2023-03-10 | Stop reason: HOSPADM

## 2023-03-09 RX ORDER — SODIUM CHLORIDE 9 MG/ML
INJECTION, SOLUTION INTRAVENOUS PRN
Status: DISCONTINUED | OUTPATIENT
Start: 2023-03-09 | End: 2023-03-10 | Stop reason: HOSPADM

## 2023-03-09 RX ORDER — POTASSIUM CHLORIDE 7.45 MG/ML
10 INJECTION INTRAVENOUS PRN
Status: DISCONTINUED | OUTPATIENT
Start: 2023-03-09 | End: 2023-03-10 | Stop reason: HOSPADM

## 2023-03-09 RX ORDER — POTASSIUM CHLORIDE 20 MEQ/1
40 TABLET, EXTENDED RELEASE ORAL PRN
Status: DISCONTINUED | OUTPATIENT
Start: 2023-03-09 | End: 2023-03-10 | Stop reason: HOSPADM

## 2023-03-09 RX ORDER — FUROSEMIDE 10 MG/ML
40 INJECTION INTRAMUSCULAR; INTRAVENOUS ONCE
Status: COMPLETED | OUTPATIENT
Start: 2023-03-09 | End: 2023-03-09

## 2023-03-09 RX ORDER — ASPIRIN 81 MG/1
162 TABLET, CHEWABLE ORAL ONCE
Status: COMPLETED | OUTPATIENT
Start: 2023-03-09 | End: 2023-03-09

## 2023-03-09 RX ORDER — ATORVASTATIN CALCIUM 40 MG/1
40 TABLET, FILM COATED ORAL DAILY
Status: DISCONTINUED | OUTPATIENT
Start: 2023-03-09 | End: 2023-03-10 | Stop reason: HOSPADM

## 2023-03-09 RX ORDER — SODIUM CHLORIDE 0.9 % (FLUSH) 0.9 %
5-40 SYRINGE (ML) INJECTION EVERY 12 HOURS SCHEDULED
Status: DISCONTINUED | OUTPATIENT
Start: 2023-03-09 | End: 2023-03-10 | Stop reason: HOSPADM

## 2023-03-09 RX ORDER — ENOXAPARIN SODIUM 100 MG/ML
40 INJECTION SUBCUTANEOUS DAILY
Status: DISCONTINUED | OUTPATIENT
Start: 2023-03-09 | End: 2023-03-10 | Stop reason: HOSPADM

## 2023-03-09 RX ORDER — ONDANSETRON 4 MG/1
4 TABLET, ORALLY DISINTEGRATING ORAL EVERY 8 HOURS PRN
Status: DISCONTINUED | OUTPATIENT
Start: 2023-03-09 | End: 2023-03-10 | Stop reason: HOSPADM

## 2023-03-09 RX ORDER — ONDANSETRON 2 MG/ML
4 INJECTION INTRAMUSCULAR; INTRAVENOUS EVERY 6 HOURS PRN
Status: DISCONTINUED | OUTPATIENT
Start: 2023-03-09 | End: 2023-03-10 | Stop reason: HOSPADM

## 2023-03-09 RX ORDER — POLYETHYLENE GLYCOL 3350 17 G/17G
17 POWDER, FOR SOLUTION ORAL DAILY PRN
Status: DISCONTINUED | OUTPATIENT
Start: 2023-03-09 | End: 2023-03-10 | Stop reason: HOSPADM

## 2023-03-09 RX ORDER — IPRATROPIUM BROMIDE AND ALBUTEROL SULFATE 2.5; .5 MG/3ML; MG/3ML
1 SOLUTION RESPIRATORY (INHALATION) EVERY 4 HOURS PRN
Status: DISCONTINUED | OUTPATIENT
Start: 2023-03-09 | End: 2023-03-10 | Stop reason: HOSPADM

## 2023-03-09 RX ADMIN — FUROSEMIDE 40 MG: 10 INJECTION, SOLUTION INTRAMUSCULAR; INTRAVENOUS at 04:18

## 2023-03-09 RX ADMIN — SODIUM CHLORIDE, PRESERVATIVE FREE 10 ML: 5 INJECTION INTRAVENOUS at 17:13

## 2023-03-09 RX ADMIN — ASPIRIN 81 MG CHEWABLE TABLET 81 MG: 81 TABLET CHEWABLE at 08:44

## 2023-03-09 RX ADMIN — ISOSORBIDE MONONITRATE 30 MG: 30 TABLET, EXTENDED RELEASE ORAL at 08:45

## 2023-03-09 RX ADMIN — CARVEDILOL 3.12 MG: 6.25 TABLET, FILM COATED ORAL at 08:45

## 2023-03-09 RX ADMIN — ATORVASTATIN CALCIUM 40 MG: 40 TABLET, FILM COATED ORAL at 17:12

## 2023-03-09 RX ADMIN — SPIRONOLACTONE 25 MG: 25 TABLET ORAL at 20:26

## 2023-03-09 RX ADMIN — SACUBITRIL AND VALSARTAN 1 TABLET: 24; 26 TABLET, FILM COATED ORAL at 17:17

## 2023-03-09 RX ADMIN — NITROGLYCERIN 1 INCH: 20 OINTMENT TOPICAL at 04:22

## 2023-03-09 RX ADMIN — CARVEDILOL 3.12 MG: 6.25 TABLET, FILM COATED ORAL at 17:12

## 2023-03-09 RX ADMIN — ENOXAPARIN SODIUM 40 MG: 100 INJECTION SUBCUTANEOUS at 08:45

## 2023-03-09 RX ADMIN — ASPIRIN 81 MG CHEWABLE TABLET 162 MG: 81 TABLET CHEWABLE at 04:18

## 2023-03-09 RX ADMIN — SODIUM CHLORIDE, PRESERVATIVE FREE 5 ML: 5 INJECTION INTRAVENOUS at 21:58

## 2023-03-09 RX ADMIN — FUROSEMIDE 40 MG: 10 INJECTION, SOLUTION INTRAMUSCULAR; INTRAVENOUS at 08:45

## 2023-03-09 RX ADMIN — EMPAGLIFLOZIN 10 MG: 10 TABLET, FILM COATED ORAL at 20:26

## 2023-03-09 RX ADMIN — FUROSEMIDE 40 MG: 10 INJECTION, SOLUTION INTRAMUSCULAR; INTRAVENOUS at 17:13

## 2023-03-09 ASSESSMENT — PAIN SCALES - GENERAL
PAINLEVEL_OUTOF10: 0
PAINLEVEL_OUTOF10: 4

## 2023-03-09 NOTE — PROGRESS NOTES
03/09/23 0429   NIV Type   NIV Started/Stopped On   Equipment Type [id number]   Mode Bilevel   Mask Type Full face mask   Mask Size Small   Settings/Measurements   PIP Observed 12 cm H20   IPAP 12 cmH20   CPAP/EPAP 6 cmH2O   Vt (Measured) 537 mL   Rate Ordered 8   Resp 21   Insp Rise Time (%) 3 %   FiO2  28 %   I Time/ I Time % 0.9 s   Minute Volume (L/min) 15.8 Liters   Mask Leak (lpm) 21 lpm   Comfort Level Good   SpO2 100   Alarm Settings   Alarms On Y   Low Pressure (cmH2O) 6 cmH2O   High Pressure (cmH2O) 35 cmH2O   Apnea (secs) 20 secs   RR Low (bpm) 6   RR High (bpm) 40 br/min   Pt placed on Bipap for sob per MD. No ABG order at this time.

## 2023-03-09 NOTE — Clinical Note
TRANSFER - OUT REPORT:     Verbal report given to: Bijal Diaz RN. Report consisted of patient's Situation, Background, Assessment and   Recommendations(SBAR). Opportunity for questions and clarification was provided. Patient transported with a Registered Nurse and [address] [place] [address] / Patient [facility] [place] [facility]. Patient transported to: holding area.

## 2023-03-09 NOTE — ED NOTES
TRANSFER - OUT REPORT:    Verbal report given to Stephany Gibson RN on Climmie Amel  being transferred to [facility] for routine progression of patient care       Report consisted of patient's Situation, Background, Assessment and   Recommendations(SBAR). Information from the following report(s) Nurse Handoff Report, ED Encounter Summary, ED SBAR, Recent Results, Procedure Verification, and Event Log was reviewed with the receiving nurse. [place] Assessment: No data recorded  Lines:   Peripheral IV 03/09/23 Left Forearm (Active)       Peripheral IV 03/09/23 Right Hand (Active)        Opportunity for questions and clarification was provided.       Patient transported with:  O2 @ 2lpm          Rosy Andrade RN  03/09/23 7900

## 2023-03-09 NOTE — Clinical Note
TRANSFER - IN REPORT:     Verbal report received from: Mercedes Christy. Report consisted of patient's Situation, Background, Assessment and   Recommendations(SBAR). Opportunity for questions and clarification was provided. Assessment completed upon patient's arrival to unit and care assumed. Patient transported with a Registered Nurse and [address] [place] [address] / [facility].

## 2023-03-09 NOTE — PROGRESS NOTES
completed the initial Spiritual Assessment of the patient, and offered Pastoral Care support to the patient who is in bed 7 of the emergency room and from where he will be admitted to the  hospital from. Patient is  resting peacefully  With the [place] working for him. Patient does not have any Restorationism/cultural needs that will affect patients preferences in health care. Chaplains will continue to follow and will provide pastoral care on an as needed/requested basis.     [facility]  Spiritual Care Department  [phone]

## 2023-03-09 NOTE — PROGRESS NOTES
[facility]   POST-ROUNDING NOTE    Assessment & Plan:    -has a good plan for how he intends to quit, knows what triggered him  -cardiology consulted to advise the above moving forward / upon DC  -continue to monitor BP  -off supplemental O2 now  -diuresing well, states hes urinating frequently   -per cardiology OK to continue non-selective BB in setting of frequent cocaine use    The above patient and plan were discussed with my supervising physician. See daily progress note for full assessment/plan.       Brenton Floyd DO, PGY-1  [facility]  3/9/2023, 11:52 AM

## 2023-03-09 NOTE — Clinical Note
Prepped: right groin and right radial. Site was prepped. Prepped with: ChloraPrep. Wet prep elapsed drying time: 3 mins. Patient was draped after wet prep solution dried.

## 2023-03-09 NOTE — Clinical Note
Contrast Dose Calculator:   Patient's age: 60.   Patient's sex: Male. Patient weight (kg) = 69. Creatinine level (mg/dL) = 1.69. Creatinine clearance (mL/min): 44. Max Contrast dose per Creatinine Cl calculator = 99 mL.

## 2023-03-09 NOTE — ED NOTES
EMERGENCY DEPARTMENT HISTORY AND PHYSICAL EXAM      Patient Name: Chaya Valladares  MRN: [id number]  YOB: 1960  Provider: Efra Gaxiola MD  PCP: Emily Tate MD   Time/Date of evaluation: 4:12 AM EST on 3/9/23    History of Presenting Illness     Chief Complaint   Patient presents with    Shortness of Breath       History Provided By: Patient and EMS     History Nito Castro): Chaya Valladares is a 60 y.o. male with a PMHX of cardiomyopathy, cocaine abuse, hypertension, and prior CVA  who presents to the emergency department  by EMS C/O shortness of breath. Past History     Past Medical History:  Past Medical History:   Diagnosis Date    Cardiomyopathy ([facility] [address]     Cocaine abuse ([facility] [address]     HTN (hypertension)     Hypertension     Stroke [facility])     Stroke risk 8814,8299, 2009    pt stated he had a stroke in above dates       Past Surgical History:  Past Surgical History:   Procedure Laterality Date    CYST REMOVAL  1993    right front of the forehead    HERNIA REPAIR  1993       Family History:  Family History   Problem Relation Age of Onset    Hypertension Father     Asthma Sister     Cancer Mother     Diabetes Father        Social History:  Social History     Tobacco Use    Smoking status: Some Days     Packs/day: 1.00     Types: Cigarettes    Smokeless tobacco: Never   Substance Use Topics    Alcohol use: Yes     Alcohol/week: 2.0 standard drinks    Drug use: No       Medications:  No current facility-administered medications for this encounter. Current Outpatient Medications   Medication Sig Dispense Refill    carvedilol (COREG) 3.125 MG tablet Take 1 tablet by mouth in the morning and 1 tablet in the evening. 60 tablet 3    sacubitril-valsartan (ENTRESTO) 24-26 MG per tablet Take 1 tablet by mouth in the morning and 1 tablet in the evening.  60 tablet 3    furosemide (LASIX) 20 MG tablet Take 1 tablet by mouth daily 60 tablet 3    isosorbide mononitrate (IMDUR) 30 MG extended release tablet Take 1 tablet by mouth daily 30 tablet 4    atorvastatin (LIPITOR) 40 MG tablet Take 1 tablet by mouth daily 30 tablet 4    acetaminophen (TYLENOL) 325 MG tablet Take 650 mg by mouth every 4 hours as needed      aspirin 81 MG chewable tablet Take 81 mg by mouth daily         Allergies:  No Known Allergies    Social Determinants of Health:  Social Determinants of Health     Tobacco Use: High Risk    Smoking Tobacco Use: Some Days    Smokeless Tobacco Use: Never    Passive Exposure: Not on file   Alcohol Use: Not on file   Financial Resource Strain: Not on file   Food Insecurity: Not on file   Transportation Needs: Not on file   Physical Activity: Not on file   Stress: Not on file   Social Connections: Not on file   Intimate Partner Violence: Not on file   Depression: Not at risk    PHQ-2 Score: 0   Housing Stability: Not on file       Review of Systems     Negative except as listed above in HPI.     Physical Exam     Vitals:    03/09/23 0233 03/09/23 0315 03/09/23 0330   BP: (!) 157/128     Pulse: (!) 104 (!) 101 (!) 103   Resp: 20 22 (!) 38   Temp: 98.6 °F (37 °C)     TempSrc: Oral     SpO2:  98% 98%   Weight: 170 lb (77.1 kg)     Height: 5' 8\" (1.727 m)       ***  Constitutional: Non-toxic appearing, moderate distress  Head: Normocephalic, Atraumatic  Neck: Supple  Cardiovascular: Regular rate and rhythm, no murmurs, rubs, or gallops  Chest: Normal work of breathing and chest excursion bilaterally  Lungs: Clear to ausculation bilaterally  Abdomen: Soft, non tender, non distended, normoactive bowel sounds  Back: No evidence of trauma or deformity  Extremities: No evidence of trauma or deformity, no LE edema  Skin: Warm and dry, normal cap refill  Neuro: Alert and appropriate, CN intact, normal speech, strength and sensation full and symmetric bilaterally, normal gait, normal coordination  Psychiatric: Normal mood and affect     OR    Physical Exam      Diagnostic Study Results     Labs:  Recent Results (from the past 12 hour(s))   CBC with Auto Differential    Collection Time: 03/09/23  3:00 AM   Result Value Ref Range    WBC 7.3 4.6 - 13.2 K/uL    RBC 4.53 4.35 - 5.65 M/uL    Hemoglobin 12.9 (L) 13.0 - 16.0 g/dL    Hematocrit 38.4 36.0 - 48.0 %    MCV 84.8 78.0 - 100.0 FL    MCH 28.5 24.0 - 34.0 PG    MCHC 33.6 31.0 - 37.0 g/dL    RDW 12.6 11.6 - 14.5 %    Platelets 567 802 - 318 K/uL    MPV 10.7 9.2 - 11.8 FL    Nucleated RBCs 0.0 0  WBC    nRBC 0.00 0.00 - 0.01 K/uL    Seg Neutrophils 72 40 - 73 %    Lymphocytes 17 (L) 21 - 52 %    Monocytes 10 3 - 10 %    Eosinophils % 0 0 - 5 %    Basophils 0 0 - 2 %    Immature Granulocytes 0 0.0 - 0.5 %    Segs Absolute 5.2 1.8 - 8.0 K/UL    Absolute Lymph # 1.3 0.9 - 3.6 K/UL    Absolute Mono # 0.8 0.05 - 1.2 K/UL    Absolute Eos # 0.0 0.0 - 0.4 K/UL    Basophils Absolute 0.0 0.0 - 0.1 K/UL    Absolute Immature Granulocyte 0.0 0.00 - 0.04 K/UL    Differential Type AUTOMATED     CMP    Collection Time: 03/09/23  3:00 AM   Result Value Ref Range    Sodium 139 136 - 145 mmol/L    Potassium 4.4 3.5 - 5.5 mmol/L    Chloride 111 100 - 111 mmol/L    CO2 22 21 - 32 mmol/L    Anion Gap 6 3.0 - 18 mmol/L    Glucose 127 (H) 74 - 99 mg/dL    BUN 16 7.0 - 18 MG/DL    Creatinine 1.67 (H) 0.6 - 1.3 MG/DL    Bun/Cre Ratio 10 (L) 12 - 20      Est, Glom Filt Rate 46 (L) >60 ml/min/1.73m2    Calcium 9.0 8.5 - 10.1 MG/DL    Total Bilirubin 0.8 0.2 - 1.0 MG/DL    ALT 51 16 - 61 U/L    AST 46 (H) 10 - 38 U/L    Alk Phosphatase 84 45 - 117 U/L    Total Protein 7.3 6.4 - 8.2 g/dL    Albumin 3.5 3.4 - 5.0 g/dL    Globulin 3.8 2.0 - 4.0 g/dL    Albumin/Globulin Ratio 0.9 0.8 - 1.7     COVID-19 & Influenza Combo    Collection Time: 03/09/23  3:00 AM    Specimen: Nasopharyngeal   Result Value Ref Range    SARS-CoV-2, PCR Not detected NOTD      Rapid Influenza A By PCR Not detected NOTD      Rapid Influenza B By PCR Not detected NOTD     Brain Natriuretic Peptide    Collection Time: 03/09/23  3:00 AM Result Value Ref Range    NT Pro-BNP 41,123 (H) 0 - 900 PG/ML   Magnesium    Collection Time: 03/09/23  3:00 AM   Result Value Ref Range    Magnesium 1.7 1.6 - 2.6 mg/dL       Radiologic Studies:   XR CHEST PORTABLE    (Results Pending)       EKG interpretation: (Preliminary)  EKG read by Dr. Shanda Lozada at 7045 sinus tachycardia with frequent PVCs, normal axis, normal intervals, no ST or T wave changes    Procedures     Procedures    ED Course     4:12 AM EST LAURENCE Lozada MD) am the first provider for this patient. Initial assessment performed. I reviewed the vital signs, available nursing notes, past medical history, past surgical history, family history and social history. The patients presenting problems have been discussed, and they are in agreement with the care plan formulated and outlined with them. I have encouraged them to ask questions as they arise throughout their visit. Records Reviewed: {Records Reviewed:96056::\"Nursing Notes\"}    Is this patient to be included in the SEP-1 core measure due to severe sepsis or septic shock? {Sep-1 Core Yes/No:043711}    MEDICATIONS ADMINISTERED IN THE ED:  Medications - No data to display         Medical Decision Making     CC/HPI Summary, DDx, ED Course, and Reassessment: ***    Disposition Considerations (tests considered but not done, Admit vs D/C, Shared Decision Making, Pt Expectation of Test or Tx.): ***       Critical Care Time:     ***    Taisha Aguilera MD    Diagnosis and Disposition     LAURENCE Lozada MD am the primary clinician of record. DIAGNOSIS: No diagnosis found. DISPOSITION        PATIENT REFERRED TO:  No follow-up provider specified.     DISCHARGE MEDICATIONS:  New Prescriptions    No medications on file       DISCONTINUED MEDICATIONS:  Discontinued Medications    No medications on file              (Please note that portions of this note were completed with a voice recognition program.  Efforts were made to edit the dictations but occasionally words are mis-transcribed.)    Ana Morrow MD (electronically signed)        Meghann Hilton     Please note that this dictation was completed with [employer], the computer voice recognition software. Quite often unanticipated grammatical, syntax, homophones, and other interpretive errors are inadvertently transcribed by the computer software. Please disregard these errors. Please excuse any errors that have escaped final proofreading. 12.9(!)   Hematocrit 38.4   MCV 84.8   MCH 28.5   MCHC 33.6   RDW 12.6   Platelet Count 505   MPV 10.7   Nucleated Red Blood Cells 0.0   Nucleated Red Blood Cells 0.00   Seg Neutrophils 72   Lymphocytes 17(!)   Monocytes 10   Eosinophils % 0   Basophils 0   Immature Granulocytes 0   Segs Absolute 5.2   Absolute Lymph # 1.3   Absolute Mono # 0.8   Absolute Eos # 0.0   Basophils Absolute 0.0   Absolute Immature Granulocyte 0.0   Differential Type AUTOMATED [EB]   0506 Magnesium:    Magnesium 1.7 [EB]   0506 Brain Natriuretic Peptide(!):    NT Pro-BNP 41,123(!) [EB]   0506 COVID-19 & Influenza Combo:    SARS-CoV-2, PCR Not detected   Rapid Influenza A By PCR Not detected   Rapid Influenza B By PCR Not detected [EB]      ED Course User Index  [EB] Corinna Cortes MD       Medical Decision Making     CC/HPI Summary, DDx, ED Course, and Reassessment: The patient is a 57-year-old male with past medical history significant for hypertension, cocaine abuse, cardiomyopathy, and CHF, who presented to the ED today with shortness of breath that woke him up out of sleep. His chest x-ray shows pulmonary edema and his BNP is 41,123. His troponin is 351. His UDS is positive for cocaine. Patient has received IV Lasix, Nitropaste, and aspirin in the ED. I have consulted the hospitalist for admission for further evaluation and management. They have accepted the patient on their service. Disposition Considerations (tests considered but not done, Admit vs D/C, Shared Decision Making, Pt Expectation of Test or Tx.): 0       Critical Care Time:     Critical Care Procedure Note  Authorized and Performed by: Julio Cesar Mane MD  Total critical care time: Approximately 36 minutes  Due to a high probability of clinically significant, life threatening deterioration, the patient required my highest level of preparedness to intervene emergently and I personally spent this critical care time directly and personally managing the patient.  This critical care time included obtaining a history; examining the patient; pulse oximetry; ordering and review of studies; arranging urgent treatment with development of a management plan; evaluation of patient's response to treatment; frequent reassessment; and, discussions with other providers. This critical care time was performed to assess and manage the high probability of imminent, life-threatening deterioration that could result in multi-organ failure. It was exclusive of separately billable procedures and treating other patients and teaching time. Please see MDM section and the rest of the note for further information on patient assessment and treatment. Rei Campbell MD    Diagnosis and Disposition     I Nohemi Shaw MD am the primary clinician of record. DIAGNOSIS:   1. Acute combined systolic and diastolic CHF, NYHA class 2 ([address]    2. CHF exacerbation (HCC)    3. Elevated troponin    4. Acute on chronic heart failure with reduced ejection fraction and diastolic dysfunction (HCC)        DISPOSITION        PATIENT REFERRED TO:  No follow-up provider specified. DISCHARGE MEDICATIONS:  New Prescriptions    No medications on file       DISCONTINUED MEDICATIONS:  Discontinued Medications    No medications on file              (Please note that portions of this note were completed with a voice recognition program.  Efforts were made to edit the dictations but occasionally words are mis-transcribed.)    Nohemi Shaw MD (electronically signed)        Dragon Disclaimer     Please note that this dictation was completed with [employer], the computer voice recognition software. Quite often unanticipated grammatical, syntax, homophones, and other interpretive errors are inadvertently transcribed by the computer software. Please disregard these errors. Please excuse any errors that have escaped final proofreading.       Rei Campbell MD  03/14/23 2519

## 2023-03-09 NOTE — CARE COORDINATION
Writer spoke with patient regarding discharge planning. Patient states that normally he doesn't have a problem taking his medication however he's has been trying to get with his ex-wife and the stress of her rejection caused him to go on a week of drinking and smoking cocaine. Patient says that while he was away from home he did not taking any of his prescribed medications. Upon discharge patient plans to go back home with his niece. Patient says he is able to get his own medications without difficulty.  Patient also says he receive approximately $800 in SSI and approximately $200 in food stamps

## 2023-03-09 NOTE — CONSULTS
[facility] - Consult Note    Cardiology consultation request from Dr. Ruth Cruz for evaluation and management/treatment of A/C HFrEF    Date of  Admission: 3/9/2023  2:30 AM   Primary Care Physician:  Britton Nicholson MD    Attending Cardiologist: Dr. Michelle Bustamante:     -Acute on chronic HFrEF associated with medication and dietary non-compliance. Echo 09/2022 with LVEF 25-30%  -Elevated troponin at 444 - 374 - 410 - 351, likely demand in setting of HFrEF/HTN  -HTN, uncontrolled, stage II, associated with medication noncompliance  -HLD, on Lipitor  -Polysubstance use, cessation advised    Primary cardiologist is Dr. Patsey Dancer:     -Continue IV Lasix with close monitoring of renal indices, strict I/O's.  -Okay to continue nonselective BB/Coreg.  -Continue ASA, Lipitor, Entresto, Imdur.  -Will consider evaluation for underlying CAD as previously discussed with primary cardiologist.  -Sodium restriction discussed, pt expresses understanding.  -Further recommendations based on hospital course. History of Present Illness: This is a 60 y.o. male admitted for Acute decompensated heart failure ([facility] [address] [I50.9]  Decompensated heart failure ([facility] [address] [I50.9]  Acute combined systolic and diastolic CHF, NYHA class 2 ([facility] [I50.41]. Patient complains of: shortness of breath      Denis Killian is a 60 y.o. male who presented to the hospital due to shortness of breath/PND. Pt reports that he recently ran out of his medication and is uncertain regarding refills available. He also admits to using cocaine and EtOH on the weekends. He denies any associated chest pain/discomfort, lightheadedness, dizziness or syncope.   He also reports eating a lot of processed foods, noting he likes to eat breakfast food/meats, soups, etc.    Cardiac risk factors: advanced age (older than 54 for men, 72 for women), dyslipidemia, hypertension, male gender, smoking/ tobacco exposure, and known CMY      Review of Symptoms:  Except as stated above include:  Constitutional:  negative  Respiratory:  negative  Cardiovascular:  As per HPI  Gastrointestinal: negative  Genitourinary:  negative  Musculoskeletal:  Negative  Neurological:  Negative  Dermatological:  Negative  Endocrinological: Negative  Psychological:  Negative       Past Medical History:     Past Medical History:   Diagnosis Date    Cardiomyopathy ([facility] [address]     Cocaine abuse ([facility]     HTN (hypertension)     Hypertension     Stroke ([facility]     Stroke risk 3852,8469, 2009    pt stated he had a stroke in above dates         Social History:     Social History     Socioeconomic History    Marital status:    Tobacco Use    Smoking status: Some Days     Packs/day: 1.00     Types: Cigarettes    Smokeless tobacco: Never   Substance and Sexual Activity    Alcohol use:  Yes     Alcohol/week: 2.0 standard drinks    Drug use: No   Social History Narrative         ** Merged History Encounter **        Family History:     Family History   Problem Relation Age of Onset    Hypertension Father     Asthma Sister     Cancer Mother     Diabetes Father         Medications:   No Known Allergies     Current Facility-Administered Medications   Medication Dose Route Frequency    sodium chloride flush 0.9 % injection 5-40 mL  5-40 mL IntraVENous 2 times per day    sodium chloride flush 0.9 % injection 5-40 mL  5-40 mL IntraVENous PRN    0.9 % sodium chloride infusion   IntraVENous PRN    ondansetron (ZOFRAN-ODT) disintegrating tablet 4 mg  4 mg Oral Q8H PRN    Or    ondansetron (ZOFRAN) injection 4 mg  4 mg IntraVENous Q6H PRN    polyethylene glycol (GLYCOLAX) packet 17 g  17 g Oral Daily PRN    acetaminophen (TYLENOL) tablet 650 mg  650 mg Oral Q6H PRN    Or    acetaminophen (TYLENOL) suppository 650 mg  650 mg Rectal Q6H PRN    enoxaparin (LOVENOX) injection 40 mg  40 mg SubCUTAneous Daily    potassium chloride (KLOR-CON M) extended release tablet 40 mEq  40 mEq Oral PRN    Or potassium bicarb-citric acid (EFFER-K) effervescent tablet 40 mEq  40 mEq Oral PRN    Or    potassium chloride 10 mEq/100 mL IVPB (Peripheral Line)  10 mEq IntraVENous PRN    magnesium sulfate 2000 mg in 50 mL IVPB premix  2,000 mg IntraVENous PRN    carvedilol (COREG) tablet 3.125 mg  3.125 mg Oral BID WC    sacubitril-valsartan (ENTRESTO) 24-26 MG per tablet 1 tablet  1 tablet Oral BID    furosemide (LASIX) injection 40 mg  40 mg IntraVENous BID    nitroGLYCERIN (NITROSTAT) SL tablet 0.4 mg  0.4 mg SubLINGual Q5 Min PRN    aspirin chewable tablet 81 mg  81 mg Oral Daily    isosorbide mononitrate (IMDUR) extended release tablet 30 mg  30 mg Oral Daily    atorvastatin (LIPITOR) tablet 40 mg  40 mg Oral Daily    ipratropium-albuterol (DUONEB) nebulizer solution 1 ampule  1 ampule Inhalation Q4H PRN         Physical Exam:     Vitals:    03/09/23 1230   BP: (!) 153/103   Pulse: 70   Resp: 18   Temp: 97.5 °F (36.4 °C)   SpO2: 96%       BP Readings from Last 3 Encounters:   03/09/23 (!) 153/103   02/13/23 (!) 158/106   09/14/22 131/86     Pulse Readings from Last 3 Encounters:   03/09/23 70   02/13/23 88   09/14/22 65     Wt Readings from Last 3 Encounters:   03/09/23 170 lb (77.1 kg)   02/13/23 158 lb (71.7 kg)   09/13/22 156 lb 1.6 oz (70.8 kg)       General:  alert, appears stated age, cooperative, and no distress  Neck:  supple  Lungs:  clear to auscultation bilaterally  Heart:  regular rate and rhythm  Abdomen:  abdomen is soft without significant tenderness, masses, organomegaly or guarding  Extremities:  atraumatic, no edema  Skin: warm and dry  Neuro: alert, oriented x3, affect appropriate, no focal neurological deficits, moves all extremities well, and no involuntary movements  Psych: non focal     Data Review:     Recent Labs     03/09/23  0300   WBC 7.3   HGB 12.9*   HCT 38.4        Recent Labs     03/09/23  0300      K 4.4      CO2 22   BUN 16   CREATININE 1.67*   MG 1.7   ALT 51       All Cardiac Markers in the last 24 hours:    Lab Results   Component Value Date/Time    TROPHS 351 03/09/2023 06:25 AM    TROPHS 410 03/09/2023 03:00 AM    TROPHS 374 12/27/2022 01:50 PM     Lab Results   Component Value Date/Time    BNP 24,181 12/27/2022 08:16 AM       Cardiographics:     Encounter Date: 03/09/23   EKG 12 Lead   Result Value    Ventricular Rate 103    Atrial Rate 103    P-R Interval 172    QRS Duration 88    Q-T Interval 372    QTc Calculation (Bazett) 487    P Axis 70    R Axis -28    T Axis 83    Diagnosis      Sinus tachycardia with frequent and consecutive premature ventricular   complexes         Xray Result (most recent):  XR CHEST PORTABLE 03/09/2023    Narrative  EXAM: XR CHEST PORTABLE    CLINICAL INDICATION/HISTORY: 60 years Male. sob. Additional History: None    TECHNIQUE: Frontal view of the chest    COMPARISON: 12/27/2022    FINDINGS:    Mild apical lordotic positioning, slightly limiting evaluation. Cardiac silhouette is mildly enlarged, increased in size from prior. Mild  prominence of pulmonary vasculature with cephalization. Mild diffuse prominence  of interstitial markings. No definite evidence of pleural effusion or pneumothorax. No acute osseous abnormality appreciated. Impression  Cardiac enlargement with mild pulmonary vascular congestion. Mild pulmonary interstitial edema versus nonspecific bronchitis.       Signed By: Carlton Barfield PA-C     March 9, 2023

## 2023-03-09 NOTE — H&P
[facility].  Admission History and Physical      Patient:    Jacklyn Smith      60 y.o. male            MRN:       [id number]                                                                                    Admission Date:         3/9/2023  Code status:                Full    Jacklyn Smith is a 60y.o. year old male admitted for Acute decompensated heart failure ([facility] [I50.9]. ASSESSMENT AND PLAN  Problem List Items Addressed This Visit    None    David Marx is a 58yo male with a PMH of HFrEF, cardiomyopathy, HTN, COPD, tobacco use, cocaine use, CKD, h/o CVA x2 who presented to the ED with worsening shortness of breath, and was admitted for acute CHF exacerbation.     Acute on Chronic HFrEF, Combined Systolic & Diastolic Dysfunction   Cardiomyopathy  HTN  -patient presented with worsening SOB after running out of Lasix 5 days ago  -pro-BNP 17173, BP at admission 157/128  -CXR unchanged from previous study; mild vascular congestion  -EKG sinus rhythm with frequent PAC's, no ST or T wave changes  -trops 410, previous in last year have ranged 374-547  -UDS (+) cocaine  -in ED received IV lasix, nitro paste, ASA, started on BiPAP  -home meds: atorvastatin 40, carvedilol 3.125 BID, entresto 24-26 BID, furosemide 20, isosorbide mononitrate ER 30  -last echo (22): severely reduced LV systolic function, EF 69-56%, dilated LV, mild septal thickening, moderate posterior thickening, septal motion consistent with bundle branch block, global hypokinesis, grade II diastolic dysfunction with increase LAP, dilated aortic root (diameter 3.6cm)  -Dr. Seferino Ponce is patient's cardiologist, last appt was   -patient received IV lasix 40mg x1, nitro paste, ASA 162mg in ED and was started on BiPAP for increased SOB with RR elevated to 30-40's  -trops downtrendin>351, now roughly at baseline  Plan:  -continue BiPAP, wean as tolerated, NPO while pt remains on BiPAP  -start IV lasix 40mg BID  -continue home meds of ASA, carvedilol, entresto, imdur  -monitor vitals, BP elevated this AM prior to receiving home meds, will monitor response to meds, can titrate dosing if indicated   -has PRN nitro on board, given trops downtrending and now at baseline can stop trending   -daily CBC, CMP, Mg     COPD  Tobacco Use  -denies having any prior issues with his lungs other than smoking, no home inhalers/meds, not on O2 at baseline  -has been smoking on and off for 30 years  -currently smokes roughly 1 pack black & milds per week  Plan:  -continue supplemental O2, PRN duoneb  -smoking cessation  -Nicotine replacement if needed     CKD IIIa  -Cr at admission 1.67 with eGFR 46, last Cr in office was 1.7 on 1/23/23 & previous in last year have ranged 1.4-1.6, so appears to be his baseline  Plan:  -avoid nephrotoxic medications  -monitor Cr in setting of IV diuresis   -daily CMP    H/o CVA  -per patient has had two strokes, first in 1980's and second in 2000's  -no residual deficits   -home meds: ASA, atorvastatin  Plan:  -continue ASA, hold atorvastatin for now given transaminitis      Cocaine Abuse  -UDS (+) cocaine  -patient reports last use on Sunday  -reports occasional marijuana use, otherwise no other drug use   Plan:  -counseled on need to step using cocaine in setting of CHF with EF 25-30%    Transaminitis  -AST at admission 46, ALT & alk phos wnl   -per chart review ALT/AST recently were 43/55 in office on 1/23/23  -per pt no h/o liver disease  -no hepatomegaly or RUQ pain on exam  -states he drinks a 6-pack of beer per week  Plan:  -Incidental, will monitor clinically    Global:  Code: Full  IVF/Drips: na  I/O / Wt: weight at admission 170 lbs  Diet: NPO while on BiPAP  Bowel Regimen, Last BM: miralax PRN  DVT/AC: lovenox  Mobility: per protocol   Disposition: stable  Anticipated LOS: 2 nights     Point of Contact: Hunter Womack (sister) [phone]      SUBJECTIVE:  History of Present Illness:  Denis Killian is a 60 y.o.  male with PMHx of HFrEF, cardiomyopathy, HTN, COPD, tobacco use, cocaine use, CKD, h/o CVA x2 who presented to [facility] ED on 3/9/2023 with complaint of worsening shortness of breath. Patient reports running out of his lasix 5 days ago, and has been having increasing shortness of breath with decreased exercise tolerance since. Denies any recent or current illness, increased cough, CP. Does not use O2 at baseline. While O2 sats remained stable in ED his SOB worsened to RR in 30-40's requiring initiation of BiPAP. ED Course:  -Afebrile, /128, , RR 20 initially but later worsened to 38 requiring biPAP  -Labs: Cr 1.67, BNP 70084, Trop 410, WBC 7.3, UDS (+) cocaine  -Imaging: CXR unchanged from previous   -EKG: sinus rhythm with frequent PACs, no ischemic changes  -Meds: IV lasix 40 x1, , nitro paste      Did non-adherence with patient's outpatient treatment plan contribute to this admission? Yes - patient ran out of his home lasix 5 days ago        PMHx, PSHx, SHx, FHx, MEDS, ALLERGIES:   Past Medical History:   Diagnosis Date    Cardiomyopathy ([facility]     Cocaine abuse ([facility]     HTN (hypertension)     Hypertension     Stroke [facility])     Stroke risk [id number], 2009    pt stated he had a stroke in above dates      Past Surgical History:   Procedure Laterality Date    CYST REMOVAL  1993    right front of the [address] [place] [address]      Social History     Tobacco Use    Smoking status: Some Days     Packs/day: 1.00     Types: Cigarettes    Smokeless tobacco: Never   Substance Use Topics    Alcohol use: Yes     Alcohol/week: 2.0 standard drinks    Drug use: No     Family History   Problem Relation Age of Onset    Hypertension Father     Asthma Sister     Cancer Mother     Diabetes Father      No Known Allergies    No current facility-administered medications for this encounter.      Current Outpatient Medications   Medication Sig Dispense Refill    carvedilol (COREG) 3.125 MG tablet Take 1 tablet by mouth in the morning and 1 tablet in the evening. 60 tablet 3    sacubitril-valsartan (ENTRESTO) 24-26 MG per tablet Take 1 tablet by mouth in the morning and 1 tablet in the evening. 60 tablet 3    furosemide (LASIX) 20 MG tablet Take 1 tablet by mouth daily 60 tablet 3    isosorbide mononitrate (IMDUR) 30 MG extended release tablet Take 1 tablet by mouth daily 30 tablet 4    atorvastatin (LIPITOR) 40 MG tablet Take 1 tablet by mouth daily 30 tablet 4    acetaminophen (TYLENOL) 325 MG tablet Take 650 mg by mouth every 4 hours as needed      aspirin 81 MG chewable tablet Take 81 mg by mouth daily         ROS  (positive findings are in BOLD; negative findings are in regular font)  Constitutional: fevers, chills, appetite changes, weight changes, fatigue  HEENT: changes in vision, changes in hearing, sore throat, dysphagia  Cardiovascular: chest pain, palpitations, PND, orthopnea, edema  Pulmonary: SOB, cough, sputum production, wheezing, chest tightness, decreased exercise tolerance  Gastrointestinal: abdominal pain, nausea/vomiting, diarrhea, constipation, melena, hematochezia  Genitourinary: dysuria, hesitation, dribbling, urgency, hematuria  Musculoskeletal: arthralgias, myalgias  Skin: rash, itching  Neurological: sensory changes, motor changes, headache  Psychiatric: mood changes  Endocrine: heat/cold intolerance  Heme: easy bruising/easy bleeding, LAD     OBJECTIVE:  Patient Vitals for the past 24 hrs:   BP Temp Temp src Pulse Resp SpO2 Height Weight   03/09/23 0429 -- -- -- -- 21 -- -- --   03/09/23 0330 -- -- -- (!) 103 (!) 38 98 % -- --   03/09/23 0315 -- -- -- (!) 101 22 98 % -- --   03/09/23 0233 (!) 157/128 98.6 °F (37 °C) Oral (!) 104 20 -- 5' 8\" (1.727 m) 170 lb (77.1 kg)     Body mass index is 25.85 kg/m².       PHYSICAL EXAM:  General: The patient appears comfortable and in no acute distress   HEENT: NCAT, PERRLA, EOM intact; oral mucosa well perfused, oropharynx clear, BiPAP in place  Neck: negative  CVS: regular rate and rhythm, S1, S2 normal, no murmur, click, rub or gallop  Lungs: chest clear, no wheezing, rales, normal symmetric air entry    Abdomen: Soft, non-distended, non-TTP, BS+, no organomegaly, no masses  Ext: No calf tenderness, peripheral pulses present, no significant edema.   Skin: Warm, Dry, Intact , No significant rashes/petechia/ecchymosis  Neuro: No focal neurologic deficits or gross abnormalities  Psych: A&Ox3, appropriate mood and affect     RECENT LABS AND IMAGING ON ADMISSION   Recent Results (from the past 24 hour(s))   CBC with Auto Differential    Collection Time: 03/09/23  3:00 AM   Result Value Ref Range    WBC 7.3 4.6 - 13.2 K/uL    RBC 4.53 4.35 - 5.65 M/uL    Hemoglobin 12.9 (L) 13.0 - 16.0 g/dL    Hematocrit 38.4 36.0 - 48.0 %    MCV 84.8 78.0 - 100.0 FL    MCH 28.5 24.0 - 34.0 PG    MCHC 33.6 31.0 - 37.0 g/dL    RDW 12.6 11.6 - 14.5 %    Platelets 065 491 - 066 K/uL    MPV 10.7 9.2 - 11.8 FL    Nucleated RBCs 0.0 0  WBC    nRBC 0.00 0.00 - 0.01 K/uL    Seg Neutrophils 72 40 - 73 %    Lymphocytes 17 (L) 21 - 52 %    Monocytes 10 3 - 10 %    Eosinophils % 0 0 - 5 %    Basophils 0 0 - 2 %    Immature Granulocytes 0 0.0 - 0.5 %    Segs Absolute 5.2 1.8 - 8.0 K/UL    Absolute Lymph # 1.3 0.9 - 3.6 K/UL    Absolute Mono # 0.8 0.05 - 1.2 K/UL    Absolute Eos # 0.0 0.0 - 0.4 K/UL    Basophils Absolute 0.0 0.0 - 0.1 K/UL    Absolute Immature Granulocyte 0.0 0.00 - 0.04 K/UL    Differential Type AUTOMATED     CMP    Collection Time: 03/09/23  3:00 AM   Result Value Ref Range    Sodium 139 136 - 145 mmol/L    Potassium 4.4 3.5 - 5.5 mmol/L    Chloride 111 100 - 111 mmol/L    CO2 22 21 - 32 mmol/L    Anion Gap 6 3.0 - 18 mmol/L    Glucose 127 (H) 74 - 99 mg/dL    BUN 16 7.0 - 18 MG/DL    Creatinine 1.67 (H) 0.6 - 1.3 MG/DL    Bun/Cre Ratio 10 (L) 12 - 20      Est, Glom Filt Rate 46 (L) >60 ml/min/1.73m2    Calcium 9.0 8.5 - 10.1 MG/DL    Total Bilirubin 0.8 0.2 - 1.0 MG/DL ALT 51 16 - 61 U/L    AST 46 (H) 10 - 38 U/L    Alk Phosphatase 84 45 - 117 U/L    Total Protein 7.3 6.4 - 8.2 g/dL    Albumin 3.5 3.4 - 5.0 g/dL    Globulin 3.8 2.0 - 4.0 g/dL    Albumin/Globulin Ratio 0.9 0.8 - 1.7     COVID-19 & Influenza Combo    Collection Time: 03/09/23  3:00 AM    Specimen: Nasopharyngeal   Result Value Ref Range    SARS-CoV-2, PCR Not detected NOTD      Rapid Influenza A By PCR Not detected NOTD      Rapid Influenza B By PCR Not detected NOTD     Troponin    Collection Time: 03/09/23  3:00 AM   Result Value Ref Range    Troponin, High Sensitivity 410 (HH) 0 - 78 ng/L   Brain Natriuretic Peptide    Collection Time: 03/09/23  3:00 AM   Result Value Ref Range    NT Pro-BNP 41,123 (H) 0 - 900 PG/ML   Magnesium    Collection Time: 03/09/23  3:00 AM   Result Value Ref Range    Magnesium 1.7 1.6 - 2.6 mg/dL          I have discussed Mr. Anatoliy pop with my attending who agrees with the plan of care. Denae Bruno DO , PGY-1   [facility]   March 9, 2023, 6:59 AM     [facility]  Senior Addendum to History and Physical    I have also seen and independently evaluated the patient. I agree with the plan as noted above, with changes added as necessary.     Vitals, labs and imaging reviewed       Ezra Conrad MD, PGY-3   [facility]   March 9, 2023, 6:59 AM

## 2023-03-09 NOTE — ED NOTES
Pt stated that he felt it was harder to breathe.     MD informed  and meds ordered and Pt placed onto BIPAP     Radha Pedraza RN  03/09/23 3022

## 2023-03-10 VITALS
DIASTOLIC BLOOD PRESSURE: 72 MMHG | BODY MASS INDEX: 23.04 KG/M2 | HEART RATE: 77 BPM | WEIGHT: 152 LBS | RESPIRATION RATE: 20 BRPM | HEIGHT: 68 IN | TEMPERATURE: 97.1 F | SYSTOLIC BLOOD PRESSURE: 104 MMHG | OXYGEN SATURATION: 94 %

## 2023-03-10 PROBLEM — I50.9 DECOMPENSATED HEART FAILURE (HCC): Status: RESOLVED | Noted: 2023-03-09 | Resolved: 2023-03-10

## 2023-03-10 PROBLEM — R77.8 ELEVATED TROPONIN: Status: RESOLVED | Noted: 2023-03-09 | Resolved: 2023-03-10

## 2023-03-10 PROBLEM — R79.89 ELEVATED TROPONIN: Status: RESOLVED | Noted: 2023-03-09 | Resolved: 2023-03-10

## 2023-03-10 PROBLEM — I50.9 ACUTE DECOMPENSATED HEART FAILURE (HCC): Status: RESOLVED | Noted: 2023-03-09 | Resolved: 2023-03-10

## 2023-03-10 LAB
ALBUMIN SERPL-MCNC: 3.2 G/DL (ref 3.4–5)
ALBUMIN/GLOB SERPL: 0.9 (ref 0.8–1.7)
ALP SERPL-CCNC: 77 U/L (ref 45–117)
ALT SERPL-CCNC: 49 U/L (ref 16–61)
ANION GAP SERPL CALC-SCNC: 9 MMOL/L (ref 3–18)
AST SERPL-CCNC: 44 U/L (ref 10–38)
BASOPHILS # BLD: 0 K/UL (ref 0–0.1)
BASOPHILS NFR BLD: 0 % (ref 0–2)
BILIRUB SERPL-MCNC: 0.6 MG/DL (ref 0.2–1)
BUN SERPL-MCNC: 27 MG/DL (ref 7–18)
BUN/CREAT SERPL: 16 (ref 12–20)
CALCIUM SERPL-MCNC: 8.6 MG/DL (ref 8.5–10.1)
CHLORIDE SERPL-SCNC: 101 MMOL/L (ref 100–111)
CO2 SERPL-SCNC: 25 MMOL/L (ref 21–32)
CREAT SERPL-MCNC: 1.69 MG/DL (ref 0.6–1.3)
DIFFERENTIAL METHOD BLD: ABNORMAL
ECHO BSA: 1.92 M2
EOSINOPHIL # BLD: 0 K/UL (ref 0–0.4)
EOSINOPHIL NFR BLD: 1 % (ref 0–5)
ERYTHROCYTE [DISTWIDTH] IN BLOOD BY AUTOMATED COUNT: 12.5 % (ref 11.6–14.5)
GLOBULIN SER CALC-MCNC: 3.7 G/DL (ref 2–4)
GLUCOSE SERPL-MCNC: 106 MG/DL (ref 74–99)
HCT VFR BLD AUTO: 39.7 % (ref 36–48)
HGB BLD-MCNC: 13.1 G/DL (ref 13–16)
IMM GRANULOCYTES # BLD AUTO: 0 K/UL (ref 0–0.04)
IMM GRANULOCYTES NFR BLD AUTO: 0 % (ref 0–0.5)
LYMPHOCYTES # BLD: 1.6 K/UL (ref 0.9–3.6)
LYMPHOCYTES NFR BLD: 42 % (ref 21–52)
MAGNESIUM SERPL-MCNC: 1.7 MG/DL (ref 1.6–2.6)
MCH RBC QN AUTO: 27.5 PG (ref 24–34)
MCHC RBC AUTO-ENTMCNC: 33 G/DL (ref 31–37)
MCV RBC AUTO: 83.4 FL (ref 78–100)
MONOCYTES # BLD: 0.6 K/UL (ref 0.05–1.2)
MONOCYTES NFR BLD: 14 % (ref 3–10)
NEUTS SEG # BLD: 1.6 K/UL (ref 1.8–8)
NEUTS SEG NFR BLD: 42 % (ref 40–73)
NRBC # BLD: 0 K/UL (ref 0–0.01)
NRBC BLD-RTO: 0 PER 100 WBC
PLATELET # BLD AUTO: 269 K/UL (ref 135–420)
PMV BLD AUTO: 10.9 FL (ref 9.2–11.8)
POTASSIUM SERPL-SCNC: 3.6 MMOL/L (ref 3.5–5.5)
PROT SERPL-MCNC: 6.9 G/DL (ref 6.4–8.2)
RBC # BLD AUTO: 4.76 M/UL (ref 4.35–5.65)
SODIUM SERPL-SCNC: 135 MMOL/L (ref 136–145)
WBC # BLD AUTO: 3.9 K/UL (ref 4.6–13.2)

## 2023-03-10 PROCEDURE — 76000 FLUOROSCOPY <1 HR PHYS/QHP: CPT | Performed by: INTERNAL MEDICINE

## 2023-03-10 PROCEDURE — 93458 L HRT ARTERY/VENTRICLE ANGIO: CPT | Performed by: INTERNAL MEDICINE

## 2023-03-10 PROCEDURE — 6370000000 HC RX 637 (ALT 250 FOR IP)

## 2023-03-10 PROCEDURE — 6360000002 HC RX W HCPCS: Performed by: INTERNAL MEDICINE

## 2023-03-10 PROCEDURE — 2580000003 HC RX 258: Performed by: INTERNAL MEDICINE

## 2023-03-10 PROCEDURE — 36415 COLL VENOUS BLD VENIPUNCTURE: CPT

## 2023-03-10 PROCEDURE — 6360000004 HC RX CONTRAST MEDICATION: Performed by: INTERNAL MEDICINE

## 2023-03-10 PROCEDURE — 85025 COMPLETE CBC W/AUTO DIFF WBC: CPT

## 2023-03-10 PROCEDURE — 2500000003 HC RX 250 WO HCPCS: Performed by: INTERNAL MEDICINE

## 2023-03-10 PROCEDURE — 80053 COMPREHEN METABOLIC PANEL: CPT

## 2023-03-10 PROCEDURE — 6360000002 HC RX W HCPCS

## 2023-03-10 PROCEDURE — 2709999900 HC NON-CHARGEABLE SUPPLY: Performed by: INTERNAL MEDICINE

## 2023-03-10 PROCEDURE — 94761 N-INVAS EAR/PLS OXIMETRY MLT: CPT

## 2023-03-10 PROCEDURE — 99233 SBSQ HOSP IP/OBS HIGH 50: CPT | Performed by: INTERNAL MEDICINE

## 2023-03-10 PROCEDURE — C1713 ANCHOR/SCREW BN/BN,TIS/BN: HCPCS | Performed by: INTERNAL MEDICINE

## 2023-03-10 PROCEDURE — 6370000000 HC RX 637 (ALT 250 FOR IP): Performed by: INTERNAL MEDICINE

## 2023-03-10 PROCEDURE — 83735 ASSAY OF MAGNESIUM: CPT

## 2023-03-10 RX ORDER — NITROGLYCERIN 20 MG/100ML
INJECTION INTRAVENOUS PRN
Status: DISCONTINUED | OUTPATIENT
Start: 2023-03-10 | End: 2023-03-10 | Stop reason: HOSPADM

## 2023-03-10 RX ORDER — SODIUM CHLORIDE 9 MG/ML
INJECTION, SOLUTION INTRAVENOUS CONTINUOUS
Status: DISCONTINUED | OUTPATIENT
Start: 2023-03-10 | End: 2023-03-10 | Stop reason: HOSPADM

## 2023-03-10 RX ORDER — SODIUM CHLORIDE 0.9 % (FLUSH) 0.9 %
5-40 SYRINGE (ML) INJECTION EVERY 12 HOURS SCHEDULED
Status: DISCONTINUED | OUTPATIENT
Start: 2023-03-10 | End: 2023-03-10 | Stop reason: HOSPADM

## 2023-03-10 RX ORDER — ACETAMINOPHEN 325 MG/1
650 TABLET ORAL EVERY 4 HOURS PRN
Status: DISCONTINUED | OUTPATIENT
Start: 2023-03-10 | End: 2023-03-10 | Stop reason: HOSPADM

## 2023-03-10 RX ORDER — SPIRONOLACTONE 25 MG/1
25 TABLET ORAL DAILY
Qty: 30 TABLET | Refills: 3 | Status: SHIPPED | OUTPATIENT
Start: 2023-03-11

## 2023-03-10 RX ORDER — FUROSEMIDE 40 MG/1
20 TABLET ORAL DAILY
Status: DISCONTINUED | OUTPATIENT
Start: 2023-03-11 | End: 2023-03-10

## 2023-03-10 RX ORDER — SODIUM CHLORIDE 9 MG/ML
INJECTION, SOLUTION INTRAVENOUS CONTINUOUS PRN
Status: COMPLETED | OUTPATIENT
Start: 2023-03-10 | End: 2023-03-10

## 2023-03-10 RX ORDER — CARVEDILOL 3.12 MG/1
3.12 TABLET ORAL 2 TIMES DAILY WITH MEALS
Qty: 60 TABLET | Refills: 3 | Status: SHIPPED | OUTPATIENT
Start: 2023-03-10

## 2023-03-10 RX ORDER — HEPARIN SODIUM 1000 [USP'U]/ML
INJECTION, SOLUTION INTRAVENOUS; SUBCUTANEOUS PRN
Status: DISCONTINUED | OUTPATIENT
Start: 2023-03-10 | End: 2023-03-10 | Stop reason: HOSPADM

## 2023-03-10 RX ORDER — FUROSEMIDE 20 MG/1
20 TABLET ORAL EVERY OTHER DAY
Qty: 60 TABLET | Refills: 3 | Status: SHIPPED | OUTPATIENT
Start: 2023-03-11

## 2023-03-10 RX ORDER — SODIUM CHLORIDE 9 MG/ML
INJECTION, SOLUTION INTRAVENOUS PRN
Status: DISCONTINUED | OUTPATIENT
Start: 2023-03-10 | End: 2023-03-10 | Stop reason: HOSPADM

## 2023-03-10 RX ORDER — SODIUM CHLORIDE 0.9 % (FLUSH) 0.9 %
5-40 SYRINGE (ML) INJECTION PRN
Status: DISCONTINUED | OUTPATIENT
Start: 2023-03-10 | End: 2023-03-10 | Stop reason: HOSPADM

## 2023-03-10 RX ORDER — IODIXANOL 320 MG/ML
INJECTION, SOLUTION INTRAVASCULAR PRN
Status: DISCONTINUED | OUTPATIENT
Start: 2023-03-10 | End: 2023-03-10 | Stop reason: HOSPADM

## 2023-03-10 RX ORDER — FUROSEMIDE 20 MG/1
20 TABLET ORAL EVERY OTHER DAY
Status: DISCONTINUED | OUTPATIENT
Start: 2023-03-11 | End: 2023-03-10 | Stop reason: HOSPADM

## 2023-03-10 RX ORDER — VERAPAMIL HYDROCHLORIDE 2.5 MG/ML
INJECTION, SOLUTION INTRAVENOUS PRN
Status: DISCONTINUED | OUTPATIENT
Start: 2023-03-10 | End: 2023-03-10 | Stop reason: HOSPADM

## 2023-03-10 RX ORDER — FUROSEMIDE 10 MG/ML
40 INJECTION INTRAMUSCULAR; INTRAVENOUS DAILY
Status: DISCONTINUED | OUTPATIENT
Start: 2023-03-10 | End: 2023-03-10

## 2023-03-10 RX ADMIN — ASPIRIN 81 MG CHEWABLE TABLET 81 MG: 81 TABLET CHEWABLE at 08:34

## 2023-03-10 RX ADMIN — CARVEDILOL 3.12 MG: 6.25 TABLET, FILM COATED ORAL at 08:33

## 2023-03-10 RX ADMIN — SPIRONOLACTONE 25 MG: 25 TABLET ORAL at 08:34

## 2023-03-10 RX ADMIN — EMPAGLIFLOZIN 10 MG: 10 TABLET, FILM COATED ORAL at 08:34

## 2023-03-10 RX ADMIN — ATORVASTATIN CALCIUM 40 MG: 40 TABLET, FILM COATED ORAL at 08:34

## 2023-03-10 RX ADMIN — SACUBITRIL AND VALSARTAN 1 TABLET: 24; 26 TABLET, FILM COATED ORAL at 08:33

## 2023-03-10 RX ADMIN — FUROSEMIDE 40 MG: 10 INJECTION, SOLUTION INTRAMUSCULAR; INTRAVENOUS at 08:33

## 2023-03-10 RX ADMIN — ISOSORBIDE MONONITRATE 30 MG: 30 TABLET, EXTENDED RELEASE ORAL at 08:33

## 2023-03-10 RX ADMIN — ENOXAPARIN SODIUM 40 MG: 100 INJECTION SUBCUTANEOUS at 08:33

## 2023-03-10 ASSESSMENT — PAIN SCALES - GENERAL
PAINLEVEL_OUTOF10: 0
PAINLEVEL_OUTOF10: 0

## 2023-03-10 ASSESSMENT — PAIN - FUNCTIONAL ASSESSMENT: PAIN_FUNCTIONAL_ASSESSMENT: NONE - DENIES PAIN

## 2023-03-10 NOTE — PROGRESS NOTES
[facility] - Progress Note    Admit Date: 3/9/2023  Attending Cardiologist: Dr. Leeanne Frankel:     -Acute on chronic HFrEF associated with medication and dietary non-compliance. Echo 09/2022 with LVEF 25-30%  -Elevated troponin at 444 - 374 - 410 - 351, likely demand in setting of HFrEF/HTN  -HTN, uncontrolled, stage II, associated with medication noncompliance  -HLD, on Lipitor  -Polysubstance use, cessation advised     Primary cardiologist is Dr. Shad Villar:       I saw, evaluated, interviewed and examined the patient personally. Late entry of note in the chart  Patient admitted with mild decompensated of heart failure in the setting of dietary noncompliance and not taking medication  States that he is doing cocaine 2 or 3 times a month  On exam today, no significant fluid overload. Labs reviewed    Discussed regarding management strategy which includes medical management vs. Ischemia evaluation ( non-invasive vs. Invasive). Risk, benefit and alternatives of each strategy discussed in detail. Risk, benefit, complication of LHC and possible PCI ( including but not limited to bleeding, vascular trauma requiring surgery,  infection, heart failure, stroke, MI, emergent bypass surgery, severe allergic reactions, kidney failure, dialysis and death ) were discussed with patient and willing to proceed with procedure. Will be using moderate sedation   By stating these are possible risks, this does not exclude the potential for additional risks not named here. Addendum:  Discussed with the patient compliance with the medication  Cardiac catheterization with any obstructive coronary disease  No significant elevation of LVEDP  Advised against any substance abuse  Continue aspirin, statin, carvedilol, Entresto, Imdur and spironolactone. He is also on Jardiance  No further cardiac work-up is planned.   Once discharged, need to follow-up with me in clinic in 4 weeks      Yana Vazquez, MD       -Will plan to proceed with cardiac catheterization today, pt in agreement with proceeding.  -Will switch to PO Lasix starting tomorrow.  -Continued on ASA, Lipitor, Coreg, Jardiance, Imdur, Entresto, Aldactone. Subjective:     No new complaints.      Objective:      Patient Vitals for the past 8 hrs:   Temp Pulse Resp BP SpO2   03/10/23 0815 97.2 °F (36.2 °C) 70 18 (!) 136/96 100 %   03/10/23 0450 97.8 °F (36.6 °C) 70 17 (!) 136/90 93 %         Patient Vitals for the past 96 hrs:   Weight   03/10/23 0815 152 lb (68.9 kg)   03/09/23 0233 170 lb (77.1 kg)               Current Facility-Administered Medications   Medication Dose Route Frequency    furosemide (LASIX) injection 40 mg  40 mg IntraVENous Daily    sodium chloride flush 0.9 % injection 5-40 mL  5-40 mL IntraVENous 2 times per day    sodium chloride flush 0.9 % injection 5-40 mL  5-40 mL IntraVENous PRN    0.9 % sodium chloride infusion   IntraVENous PRN    ondansetron (ZOFRAN-ODT) disintegrating tablet 4 mg  4 mg Oral Q8H PRN    Or    ondansetron (ZOFRAN) injection 4 mg  4 mg IntraVENous Q6H PRN    polyethylene glycol (GLYCOLAX) packet 17 g  17 g Oral Daily PRN    acetaminophen (TYLENOL) tablet 650 mg  650 mg Oral Q6H PRN    Or    acetaminophen (TYLENOL) suppository 650 mg  650 mg Rectal Q6H PRN    enoxaparin (LOVENOX) injection 40 mg  40 mg SubCUTAneous Daily    potassium chloride (KLOR-CON M) extended release tablet 40 mEq  40 mEq Oral PRN    Or    potassium bicarb-citric acid (EFFER-K) effervescent tablet 40 mEq  40 mEq Oral PRN    Or    potassium chloride 10 mEq/100 mL IVPB (Peripheral Line)  10 mEq IntraVENous PRN    magnesium sulfate 2000 mg in 50 mL IVPB premix  2,000 mg IntraVENous PRN    carvedilol (COREG) tablet 3.125 mg  3.125 mg Oral BID WC    sacubitril-valsartan (ENTRESTO) 24-26 MG per tablet 1 tablet  1 tablet Oral BID    nitroGLYCERIN (NITROSTAT) SL tablet 0.4 mg  0.4 mg SubLINGual Q5 Min PRN    aspirin chewable tablet 81 mg  81 mg Oral Daily    isosorbide mononitrate (IMDUR) extended release tablet 30 mg  30 mg Oral Daily    atorvastatin (LIPITOR) tablet 40 mg  40 mg Oral Daily    ipratropium-albuterol (DUONEB) nebulizer solution 1 ampule  1 ampule Inhalation Q4H PRN    spironolactone (ALDACTONE) tablet 25 mg  25 mg Oral Daily    empagliflozin (JARDIANCE) tablet 10 mg  10 mg Oral Daily         Intake/Output Summary (Last 24 hours) at 3/10/2023 1237  Last data filed at 3/10/2023 3530  Gross per 24 hour   Intake 360 ml   Output 1450 ml   Net -1090 ml       Physical Exam:  General:  alert, appears stated age, cooperative, and no distress  Neck:  supple  Lungs:  clear to auscultation bilaterally  Heart:  regular rate and rhythm  Abdomen:  abdomen is soft without significant tenderness, masses, organomegaly or guarding  Extremities:  atraumatic, no significant pitting edema    Visit Vitals  BP (!) 136/96   Pulse 70   Temp 97.2 °F (36.2 °C)   Resp 18   Ht 5' 8\" (1.727 m)   Wt 152 lb (68.9 kg)   SpO2 100%   BMI 23.11 kg/m²       Data Review:     Labs: Results:       Chemistry Recent Labs     03/09/23  0300 03/10/23  0030    135*   K 4.4 3.6    101   CO2 22 25   BUN 16 27*   CREATININE 1.67* 1.69*   MG 1.7 1.7   GLOB 3.8 3.7      CBC w/Diff Recent Labs     03/09/23  0300 03/10/23  0030   WBC 7.3 3.9*   RBC 4.53 4.76   HGB 12.9* 13.1   HCT 38.4 39.7    269      Cardiac Enzymes Lab Results   Component Value Date/Time    TROPHS 351 03/09/2023 06:25 AM    TROPHS 410 03/09/2023 03:00 AM    TROPHS 374 12/27/2022 01:50 PM      BNP Lab Results   Component Value Date    BNP 24,181 (H) 12/27/2022      Liver Enzymes Lab Results   Component Value Date    ALT 49 03/10/2023    AST 44 (H) 03/10/2023    ALKPHOS 77 03/10/2023    BILITOT 0.6 03/10/2023        Signed By: Wilfredo Nichole PA-C     March 10, 2023

## 2023-03-10 NOTE — PROGRESS NOTES
[facility] [address]  DAILY PROGRESS NOTE      Patient:    Racheal Washburn , 60 y.o. male   MRN:  [id number]  Room/Bed:  360/01  Admission Date:   3/9/2023  Code status:  Full Code    Reason for Admission: medical evaluation     ASSESSMENT AND PLAN:   Problem List Items Addressed This Visit          Circulatory    Acute combined systolic and diastolic CHF, NYHA class 2 ([facility] [address] - Primary    Relevant Medications    spironolactone (ALDACTONE) tablet 25 mg    empagliflozin (JARDIANCE) tablet 10 mg       Shon De La Torre is a 60yo male with a PMH of HFrEF, cardiomyopathy, HTN, COPD, tobacco use, cocaine use, CKD, h/o CVA x2 who presented to the ED with worsening shortness of breath, and was admitted for acute CHF exacerbation.      Acute on Chronic HFrEF, Combined Systolic & Diastolic Dysfunction   Cardiomyopathy  HTN  -patient presented with worsening SOB after running out of Lasix 5 days prior  -pro-BNP 54140, BP at admission 157/128  -CXR unchanged from previous study; mild vascular congestion  -EKG sinus rhythm with frequent PAC's, no ST or T wave changes  -trops 410>351, previous in last year have ranged 374-547  -UDS (+) cocaine  -home meds: atorvastatin 40, carvedilol 3.125 BID, entresto 24-26 BID, furosemide 20, isosorbide mononitrate ER 30  -last echo (9/13/22): severely reduced LV systolic function, EF 55-03%, dilated LV, mild septal thickening, moderate posterior thickening, septal motion consistent with bundle branch block, global hypokinesis, grade II diastolic dysfunction with increase LAP, dilated aortic root (diameter 3.6cm)  -Dr. Dmitriy Brantley is patient's cardiologist, last appt was 2/13  Plan:  -continue diuresis with IV lasix 40mg BID, will titrate based on fluid status - strict I&O's, fluid restrict 2L, salt <2g per day  -continue home meds of ASA, carvedilol, entresto, imdur  -per cardiology consult started on jardiance 10 and spironolactone 25, continue to appreciate recs; patient will need ischemic evaluation  -patient currently NPO pending possible cath with Dr. Kay Kent today     COPD  Tobacco Use  -denies having any prior issues with his lungs other than smoking, no home inhalers/meds, not on O2 at baseline  -has been smoking on and off for 30 years  -currently smokes roughly 1 pack black & milds per week  Plan:  -PRN duoneb  -smoking cessation  -Nicotine replacement if needed     CKD IIIa  -Cr at admission 1.67 with eGFR 46, last Cr in office was 1.7 on 1/23/23 & previous in last year have ranged 1.4-1.6, so appears to be his baseline  Plan:  -avoid nephrotoxic medications  -monitor Cr in setting of IV diuresis, daily BMP     H/o CVA  -per patient has had two strokes, first in 1980's and second in 2000's  -no residual deficits   -home meds: ASA, atorvastatin  Plan:  -continue ASA, atorvastatin       Cocaine Abuse  -UDS (+) cocaine  -patient reports last use on Sunday  -reports occasional marijuana use, otherwise no other drug use   Plan:  -counseled on need to step using cocaine in setting of CHF with EF 25-30%     Transaminitis  -AST at admission 46, ALT & alk phos wnl   -per chart review ALT/AST recently were 43/55 in office on 1/23/23  -per pt no h/o liver disease  -no hepatomegaly or RUQ pain on exam  -states he drinks a 6-pack of beer per week  Plan:  -Incidental, will monitor clinically     Global:  Code: Full  IVF/Drips: na  I/O / Wt: weight at admission 170 lbs  Diet: NPO pending cath  Bowel Regimen, Last BM: miralax PRN  DVT/AC: lovenox  Mobility: per protocol   Disposition: stable  Anticipated LOS: 2 nights     Point of Contact: Ema Ellis (sister) [phone]        SUBJECTIVE:   Events of the last 24 hours:  No acute events overnight. Patient seen at beside. No acute complaints. States his breathing is much better. Eating and drinking well, voiding appropriately. Denies chest pain or palpitations.      ROS (positive findings are in BOLD; negative findings are in regular font)  Constitutional: fevers, chills, appetite changes, weight changes, fatigue  HEENT: changes in vision, changes in hearing, sore throat, dysphagia  Cardiovascular: chest pain, palpitations, PND, orthopnea, edema  Pulmonary: improved SOB, cough, sputum production, wheezing, chest tightness  Gastrointestinal: abdominal pain, nausea/vomiting, diarrhea, constipation, melena, hematochezia  Genitourinary: dysuria, hesitation, dribbling, urgency, hematuria  Musculoskeletal: arthralgias, myalgias  Skin: rash, itching  Neurological: sensory changes, motor changes, headache  Psychiatric: mood changes  Endocrine: heat/cold intolerance  Heme: easy bruising/easy bleeding, LAD    CURRENT INPATIENT MEDICATIONS:  Current Facility-Administered Medications   Medication Dose Route Frequency Provider Last Rate Last Admin    sodium chloride flush 0.9 % injection 5-40 mL  5-40 mL IntraVENous 2 times per day Perlie Poll, DO   5 mL at 03/09/23 2158    sodium chloride flush 0.9 % injection 5-40 mL  5-40 mL IntraVENous PRN Perlie Poll, DO        0.9 % sodium chloride infusion   IntraVENous PRN Perlie Poll, DO        ondansetron (ZOFRAN-ODT) disintegrating tablet 4 mg  4 mg Oral Q8H PRN Perlie Poll, DO        Or    ondansetron (ZOFRAN) injection 4 mg  4 mg IntraVENous Q6H PRN Perlie Poll, DO        polyethylene glycol (GLYCOLAX) packet 17 g  17 g Oral Daily PRN Perlie Poll, DO        acetaminophen (TYLENOL) tablet 650 mg  650 mg Oral Q6H PRN Perlie Poll, DO        Or    acetaminophen (TYLENOL) suppository 650 mg  650 mg Rectal Q6H PRN Perlie Poll, DO        enoxaparin (LOVENOX) injection 40 mg  40 mg SubCUTAneous Daily Perlie Poll, DO   40 mg at 03/09/23 0845    potassium chloride (KLOR-CON M) extended release tablet 40 mEq  40 mEq Oral PRN Perlie Poll, DO        Or    potassium bicarb-citric acid (EFFER-K) effervescent tablet 40 mEq  40 mEq Oral PRN Perlie Poll, DO        Or    potassium chloride 10 mEq/100 mL IVPB (Peripheral Line)  10 mEq IntraVENous PRN Blanca Nooksack, DO        magnesium sulfate 2000 mg in 50 mL IVPB premix  2,000 mg IntraVENous PRN Blanca Nooksack, DO        carvedilol (COREG) tablet 3.125 mg  3.125 mg Oral BID WC Blanca Nooksack, DO   3.125 mg at 03/09/23 1712    sacubitril-valsartan (ENTRESTO) 24-26 MG per tablet 1 tablet  1 tablet Oral BID Blanca Nooksack, DO   1 tablet at 03/09/23 1717    furosemide (LASIX) injection 40 mg  40 mg IntraVENous BID Blanca Nooksack, DO   40 mg at 03/09/23 1713    nitroGLYCERIN (NITROSTAT) SL tablet 0.4 mg  0.4 mg SubLINGual Q5 Min PRN Blanca Nooksack, DO        aspirin chewable tablet 81 mg  81 mg Oral Daily Blanca Nooksack, DO   81 mg at 03/09/23 0844    isosorbide mononitrate (IMDUR) extended release tablet 30 mg  30 mg Oral Daily Blanca Nooksack, DO   30 mg at 03/09/23 0845    atorvastatin (LIPITOR) tablet 40 mg  40 mg Oral Daily Blanca Nooksack, DO   40 mg at 03/09/23 1712    ipratropium-albuterol (DUONEB) nebulizer solution 1 ampule  1 ampule Inhalation Q4H PRN Blanca Nooksack, DO        spironolactone (ALDACTONE) tablet 25 mg  25 mg Oral Daily Vanna Malloy MD   25 mg at 03/09/23 2026    empagliflozin (JARDIANCE) tablet 10 mg  10 mg Oral Daily Vanna Malloy MD   10 mg at 03/09/23 2026       Allergies  No Known Allergies    OBJECTIVE:    Intake/Output Summary (Last 24 hours) at 3/10/2023 8790  Last data filed at 3/9/2023 2335  Gross per 24 hour   Intake 360 ml   Output 1220 ml   Net -860 ml       BP (!) 136/90   Pulse 70   Temp 97.8 °F (36.6 °C) (Oral)   Resp 17   Ht 5' 8\" (1.727 m)   Wt 170 lb (77.1 kg)   SpO2 93%   BMI 25.85 kg/m²     PHYSICAL EXAM  Gen: NAD, comfortable  HEENT: normocephalic, atraumatic, MMM, no thyromegaly, no JVD  CV: RRR, S1/S2 present without M/R/G, +2 radial pulses, well-perfused, PMI not displaced  Pulm: CTAB, no wheezes, no crackles  Abd: S/NT/ND, no rebound, no guarding, no hepatosplenomegaly   MSK: no clubbing, no edema  Skin: warm, dry, intact, no rash  Neuro: CN II-XII grossly intact, no focal deficits appreciated   Psych: appropriate, alert, oriented x3    RESULTS     No results found for: LACTA     Lab results last 24 hrs:  Recent Results (from the past 24 hour(s))   Magnesium    Collection Time: 03/10/23 12:30 AM   Result Value Ref Range    Magnesium 1.7 1.6 - 2.6 mg/dL   CBC with Auto Differential    Collection Time: 03/10/23 12:30 AM   Result Value Ref Range    WBC 3.9 (L) 4.6 - 13.2 K/uL    RBC 4.76 4.35 - 5.65 M/uL    Hemoglobin 13.1 13.0 - 16.0 g/dL    Hematocrit 39.7 36.0 - 48.0 %    MCV 83.4 78.0 - 100.0 FL    MCH 27.5 24.0 - 34.0 PG    MCHC 33.0 31.0 - 37.0 g/dL    RDW 12.5 11.6 - 14.5 %    Platelets 271 083 - 020 K/uL    MPV 10.9 9.2 - 11.8 FL    Nucleated RBCs 0.0 0  WBC    nRBC 0.00 0.00 - 0.01 K/uL    Seg Neutrophils 42 40 - 73 %    Lymphocytes 42 21 - 52 %    Monocytes 14 (H) 3 - 10 %    Eosinophils % 1 0 - 5 %    Basophils 0 0 - 2 %    Immature Granulocytes 0 0.0 - 0.5 %    Segs Absolute 1.6 (L) 1.8 - 8.0 K/UL    Absolute Lymph # 1.6 0.9 - 3.6 K/UL    Absolute Mono # 0.6 0.05 - 1.2 K/UL    Absolute Eos # 0.0 0.0 - 0.4 K/UL    Basophils Absolute 0.0 0.0 - 0.1 K/UL    Absolute Immature Granulocyte 0.0 0.00 - 0.04 K/UL    Differential Type AUTOMATED     Comprehensive Metabolic Panel    Collection Time: 03/10/23 12:30 AM   Result Value Ref Range    Sodium 135 (L) 136 - 145 mmol/L    Potassium 3.6 3.5 - 5.5 mmol/L    Chloride 101 100 - 111 mmol/L    CO2 25 21 - 32 mmol/L    Anion Gap 9 3.0 - 18 mmol/L    Glucose 106 (H) 74 - 99 mg/dL    BUN 27 (H) 7.0 - 18 MG/DL    Creatinine 1.69 (H) 0.6 - 1.3 MG/DL    Bun/Cre Ratio 16 12 - 20      Est, Glom Filt Rate 45 (L) >60 ml/min/1.73m2    Calcium 8.6 8.5 - 10.1 MG/DL    Total Bilirubin 0.6 0.2 - 1.0 MG/DL    ALT 49 16 - 61 U/L    AST 44 (H) 10 - 38 U/L    Alk Phosphatase 77 45 - 117 U/L    Total Protein 6.9 6.4 - 8.2 g/dL    Albumin 3.2 (L) 3.4 - 5.0 g/dL    Globulin 3.7 2.0 - 4.0 g/dL    Albumin/Globulin Ratio 0.9 0.8 - 1.7           Imaging results last 24hrs (Impression only):  XR CHEST PORTABLE    Result Date: 3/9/2023  Cardiac enlargement with mild pulmonary vascular congestion. Mild pulmonary interstitial edema versus nonspecific bronchitis.           ================================================================  Further management for Mr. Milo Lugo will be discussed on rounds with my attending.       Sakina Vickers DO, PGY-1  [facility]  March 10, 2023 6:25 AM

## 2023-03-10 NOTE — PLAN OF CARE
Problem: Discharge Planning  Goal: Discharge to home or other facility with appropriate resources  3/10/2023 1639 by Yeni Murdokc RN  Outcome: Adequate for Discharge  3/10/2023 1639 by Yeni Murdock RN  Outcome: Progressing     Problem: Skin/Tissue Integrity  Goal: Absence of new skin breakdown  Description: 1. Monitor for areas of redness and/or skin breakdown  2. Assess vascular access sites hourly  3. Every 4-6 hours minimum:  Change oxygen saturation probe site  4. Every 4-6 hours:  If on nasal continuous positive airway pressure, respiratory therapy assess nares and determine need for appliance change or resting period.   3/10/2023 1639 by Yeni Murdock RN  Outcome: Adequate for Discharge  3/10/2023 1639 by Yeni Murdock RN  Outcome: Progressing     Problem: Safety - Adult  Goal: Free from fall injury  3/10/2023 1639 by Yeni Murdock RN  Outcome: Adequate for Discharge  3/10/2023 1639 by Yeni Murdock RN  Outcome: Progressing     Problem: Chronic Conditions and Co-morbidities  Goal: Patient's chronic conditions and co-morbidity symptoms are monitored and maintained or improved  3/10/2023 1639 by Yeni Murdock RN  Outcome: Adequate for Discharge  3/10/2023 1639 by Yeni Murdock RN  Outcome: Progressing     Problem: Nutrition Deficit:  Goal: Optimize nutritional status  3/10/2023 1639 by Yeni Murdock RN  Outcome: Adequate for Discharge  3/10/2023 1639 by Yeni Murdock RN  Outcome: Progressing

## 2023-03-10 NOTE — PLAN OF CARE
Problem: Discharge Planning  Goal: Discharge to home or other facility with appropriate resources  3/10/2023 1647 by Ifeanyi Rivera RN  Outcome: Completed  3/10/2023 1642 by Ifeanyi Rivera RN  Outcome: Adequate for Discharge  3/10/2023 1639 by Ifeanyi Rivera RN  Outcome: Adequate for Discharge  3/10/2023 1639 by Ifeanyi Rivera RN  Outcome: Progressing     Problem: Skin/Tissue Integrity  Goal: Absence of new skin breakdown  Description: 1. Monitor for areas of redness and/or skin breakdown  2. Assess vascular access sites hourly  3. Every 4-6 hours minimum:  Change oxygen saturation probe site  4. Every 4-6 hours:  If on nasal continuous positive airway pressure, respiratory therapy assess nares and determine need for appliance change or resting period.   3/10/2023 1647 by Ifeanyi Rivera RN  Outcome: Completed  3/10/2023 1642 by Ifeanyi Rivera RN  Outcome: Adequate for Discharge  3/10/2023 1639 by Ifeanyi Rivera RN  Outcome: Adequate for Discharge  3/10/2023 1639 by Ifeanyi Rivera RN  Outcome: Progressing     Problem: Safety - Adult  Goal: Free from fall injury  3/10/2023 1647 by Ifeanyi Rivera RN  Outcome: Completed  3/10/2023 1642 by Ifeanyi Rivera RN  Outcome: Adequate for Discharge  3/10/2023 1639 by Ifeanyi Rivera RN  Outcome: Adequate for Discharge  3/10/2023 1639 by Ifeanyi Rivera RN  Outcome: Progressing     Problem: Chronic Conditions and Co-morbidities  Goal: Patient's chronic conditions and co-morbidity symptoms are monitored and maintained or improved  3/10/2023 1647 by Ifeanyi Rivera RN  Outcome: Completed  3/10/2023 1642 by Ifeanyi Rivera RN  Outcome: Adequate for Discharge  3/10/2023 1639 by Ifeanyi Rivera RN  Outcome: Adequate for Discharge  3/10/2023 1639 by Ifeanyi Rivera RN  Outcome: Progressing     Problem: Nutrition Deficit:  Goal: Optimize nutritional status  3/10/2023 1647 by Ifeanyi Rivera RN  Outcome: Completed  3/10/2023 1642 by Ifeanyi Rivera RN  Outcome: Adequate for Discharge  3/10/2023 1639 by Elvira Colin RN  Outcome: Adequate for Discharge  3/10/2023 1639 by Elvira Colin RN  Outcome: Progressing

## 2023-03-10 NOTE — CARE COORDINATION
03/09/23 0935   Service Assessment   Patient Orientation Alert and Oriented   Cognition Alert   History Provided By Patient   Primary [facility]  Family Members   Patient's Healthcare Decision Maker is:   (self)   PCP Verified by CM Yes   Prior Functional Level Independent in ADLs/IADLs   Current Functional Level Independent in ADLs/IADLs   Can patient return to prior living arrangement Yes   Ability to make needs known: Good   Family able to assist with home care needs: Yes   Would you like for me to discuss the discharge plan with any other family members/significant others, and if so, who? No   Financial Resources [employer] [place]; Medicaid   Social/Functional History   Lives With Family   Type of [address] One level   Home Access Level entry   [address] Responsibilities Yes   Meal Prep Responsibility Primary   Laundry Responsibility Primary   Cleaning Responsibility Primary   Bill Paying/Finance Responsibility Primary   Ambulation Assistance Independent   Transfer Assistance Independent   Active  No   Occupation On disability   Discharge Planning   [facility]   Current Services Prior To Admission None   DME Ordered? No   Potential Assistance Purchasing Medications No   [facility]   Mode of Transport at Discharge Self  (pt states his sister will transport him home at time of discharge.)   Confirm Follow Up Transport Self     Case Management Assessment  Initial Evaluation    Date/Time of Evaluation: 3/10/2023 9:15 AM  Assessment Completed by: Kassandra Lindsay RN    If patient is discharged prior to next notation, then this note serves as note for discharge by case management.     Patient Name: Zainab Pineda                   YOB: 1960  Diagnosis: Acute decompensated heart failure ([facility] [address] [I50.9]  Decompensated heart failure ([facility] [address] [I50.9]  Acute combined systolic and diastolic CHF, NYHA class [facility] ([facility] [I50.41]                   Date / Time: 3/9/2023  2:30 AM    Patient Admission Status: Inpatient   Readmission Risk (Low < 19, Mod (19-27), High > 27): Readmission Risk Score: 11.1    Current PCP: Noemi Michael MD  PCP verified by CM? (P) Yes    Chart Reviewed: Yes      History Provided by: Patient  Patient Orientation: Alert and Oriented    Patient Cognition: Alert    Hospitalization in the last 30 days (Readmission):  No    If yes, Readmission Assessment in  Navigator will be completed. Advance Directives:      Code Status: Full Code   Patient's Primary Decision Maker is:  (self)      Discharge Planning:    Patient lives with: Family Members (Niece) Type of Home: House  Primary Care Giver: Self  Patient Support Systems include: Family Members   Current Financial resources: [employer] [place], Medicaid  Current community resources:    Current services prior to admission: None            Current DME:              Type of Home Care services:  None    ADLS  Prior functional level: Independent in ADLs/IADLs  Current functional level: Independent in ADLs/IADLs    PT AM-PAC:   /24  OT AM-PAC:   /24    Family can provide assistance at DC: Yes  Would you like Case Management to discuss the discharge plan with any other family members/significant others, and if so, who?  No  Plans to Return to Present Housing: Yes  Other Identified Issues/Barriers to RETURNING to current housing: none noted  Potential Assistance needed at discharge: Durable Medical Equipment            Potential DME:    Patient expects to discharge to: [address] [place] [address] for transportation at discharge: (P) Self    Financial    Payor: [address] [place] [address] / Plan: [facility] / Product Type: *No Product type* /     Does insurance require precert for SNF: yes    Potential assistance Purchasing Medications: Yes  Meds-to-Beds request:        [id number], [address] [place] [address] [facility] [phone] - F [phone]  [address] [place] [address] [address]  ([address])  [address]  [address]  Phone: [phone] Fax: [phone]      Notes:    Factors facilitating achievement of predicted outcomes: Family support, Cooperative, and Pleasant    Barriers to discharge: Medication managment    Additional Case Management Notes: The Plan for Transition of Care is related to the following treatment goals of Acute decompensated heart failure ([address] [I50.9]  Decompensated heart failure ([address] [I50.9]  Acute combined systolic and diastolic CHF, NYHA class 2 ([address] [S61.47]    IF APPLICABLE: The Patient and/or patient representative Yana Zhu and his family were provided with a choice of provider and agrees with the discharge plan. Freedom of choice list with basic dialogue that supports the patient's individualized plan of care/goals and shares the quality data associated with the providers was provided to:     Patient Representative Name:       The Patient and/or Patient Representative Agree with the Discharge Plan?       Ericka Graves RN  Case Management Department  Ph: [phone]

## 2023-03-10 NOTE — PROGRESS NOTES
Discharge order noted for today. Orders received. No needs identified at this time. Case management remains available as needed. Patient states his sister will provide transport home.       [facility] RN [facility]  Case Management

## 2023-03-10 NOTE — CONSULTS
Comprehensive Nutrition Assessment    Type and Reason for Visit:  Initial, Positive Nutrition Screen, Consult, NPO/Clear Liquid    Nutrition Recommendations/Plan:   Advance PO diet as tolerated when medically feasible. Continue to monitor readiness for PO, weight, labs, and plan of care during admission. Malnutrition Assessment:  Malnutrition Status: At risk for malnutrition (Comment) (mild muscle wasting) (03/10/23 1235)    Context:  Acute Illness       Nutrition History and Allergies: PMHx: HFrEF, cardiomyopathy, HTN, COPD, tobacco use, cocaine use, CKD, h/o CVA x2. Wt hx: 156 lb (9/13/22), 158 lb (2/13/23). Pt reports weighing 170 lb a couple months ago, 150-160 lb more recently. Bed scale this date 152 lb. Wt loss of 11.7% per pt report, 3.8% x 1 month per EMR hx. Reports good intake/appetite PTA, no decrease in appetite, usually eats 2 meals per day and snacks all day. NKFA. Nutrition Assessment:    Admitted for SOB, CHF exacerbation. Consult noted for diet education. Positive nutrition screen noted, MST. Visited pt, currently NPO for procedure. Missing teeth, denies chewing/swallowing issues. Provided verbal and written education. Nutrition Education:  Educated on cardiac diet  Learners: Patient  Readiness: Acceptance  Method: Explanation and Handout  Response: Verbalizes Understanding  Contact name and number provided. Nutrition Related Findings:    Pertinent Meds: lipitor, coreg, lovenox, lasix, spironolactone Pertinent Labs: Na 135 L (trending down), K 3.6 wnl (trending down), BUN/Cr 27/1.69, GFR 45 Wound Type: None       Current Nutrition Intake & Therapies:    Average Meal Intake: NPO     Diet NPO Exceptions are: Sips of Water with Meds    Anthropometric Measures:  Height: 5' 8\" (172.7 cm) (taken by RD)  Ideal Body Weight (IBW): 154 lbs (70 kg)    Admission Body Weight: 151 lb 14.4 oz (68.9 kg) (68.9 kg)  Current Body Weight: 151 lb 14.4 oz (68.9 kg), 98.6 % IBW.     Current BMI (kg/m2): 23.1    Estimated Daily Nutrient Needs:  Energy Requirements Based On: Formula  Weight Used for Energy Requirements: Current  Energy (kcal/day): 3773-3616 (MSJ 1.2-1.3)  Weight Used for Protein Requirements: Current  Protein (g/day): 69-83 (1-1.2 g/day)  Method Used for Fluid Requirements: 1 ml/kcal  Fluid (ml/day): 1654-0471    Nutrition Diagnosis:   Inadequate oral intake related to acute injury/trauma as evidenced by NPO or clear liquid status due to medical condition    Nutrition Interventions:   Food and/or Nutrient Delivery: Continue NPO, Start Oral Diet  Nutrition Education/Counseling: Education initiated, Education completed  Coordination of Nutrition Care: Continue to monitor while inpatient  Plan of Care discussed with: pt    Goals:     Goals: Meet at least 75% of estimated needs, by next RD assessment       Nutrition Monitoring and Evaluation:   Behavioral-Environmental Outcomes: None Identified  Food/Nutrient Intake Outcomes: Diet Advancement/Tolerance  Physical Signs/Symptoms Outcomes: Biochemical Data, Meal Time Behavior, Hemodynamic Status, Chewing or Swallowing, Fluid Status or Edema, GI Status, Weight    Discharge Planning:     Too soon to determine     Lidia Malin Isacc [address], 65 [address]   Contact: [phone] Protocol For Photochemotherapy For Severe Photoresponsive Dermatoses: Tar And Broad Band Uvb (Goeckerman Treatment): The patient received Photochemotherapy for severe photoresponsive dermatoses: Tar and Broad Band UVB (Goeckerman treatment) requiring at least 4 to 8 hours of care under direct physician supervision. Total Body Time: 4:38 Protocol For Photochemotherapy For Severe Photoresponsive Dermatoses: Tar And Nbuvb (Goeckerman Treatment): The patient received Photochemotherapy for severe photoresponsive dermatoses: Tar and NBUVB (Goeckerman treatment) requiring at least 4 to 8 hours of care under direct physician supervision. Protocol For Bath Puva: The patient received Bath PUVA. Total Body Energy: 500 Post-Care Instructions: I reviewed with the patient in detail post-care instructions. Patient is to wear sun protection. Patients may expect sunburn like redness, discomfort and scabbing. Protocol For Uva1: The patient received UVA1. Protocol For Photochemotherapy: Tar And Nbuvb (Goeckerman Treatment): The patient received Photochemotherapy: Tar and NBUVB (Goeckerman treatment). Protocol For Photochemotherapy For Severe Photoresponsive Dermatoses: Petrolatum And Nbuvb: The patient received Photochemotherapy for severe photoresponsive dermatoses: Petrolatum and NBUVB requiring at least 4 to 8 hours of care under direct physician supervision. Protocol For Photochemotherapy: Baby Oil And Nbuvb: The patient received Photochemotherapy: Baby Oil and NBUVB (baby oil applied to all lesions prior to phototherapy). Protocol: Photochemotherapy: Petrolatum and NBUVB Protocol For Photochemotherapy: Petrolatum And Nbuvb: The patient received Photochemotherapy: Petrolatum and NBUVB (Petrolatum was made available to the patient and offered to help with application, the patient elected to apply the petrolatum to his/her own affected areas). I confirmed that the petroleum was applied to patient’s affected areas before the light treatment was started. Detail Level: Generalized Protocol For Photochemotherapy For Severe Photoresponsive Dermatoses: Petrolatum And Broad Band Uvb: The patient received Photochemotherapyfor severe photoresponsive dermatoses: Petrolatum and Broad Band UVB requiring at least 4 to 8 hours of care under direct physician supervision. Protocol For Protocol For Photochemotherapy For Severe Photoresponsive Dermatoses: Bath Puva: The patient received Photochemotherapy for severe photoresponsive dermatoses: Bath PUVA requiring at least 4 to 8 hours of care under direct physician supervision. Protocol For Broad Band Uvb: The patient received Broad Band UVB. Protocol For Photochemotherapy: Mineral Oil And Nbuvb: The patient received Photochemotherapy: Mineral Oil and NBUVB (mineral oil applied to all lesions prior to phototherapy). Render Post-Care In The Note: no Protocol For Photochemotherapy: Petrolatum And Broad Band Uvb: The patient received Photochemotherapy: Petrolatum and Broad Band UVB. Treatment Number: 107 Protocol For Photochemotherapy: Tar And Broad Band Uvb (Goeckerman Treatment): The patient received Photochemotherapy: Tar and Broad Band UVB (Goeckerman treatment). Protocol For Nb Uva: The patient received NB UVA. Protocol For Puva: The patient received PUVA. Protocol For Photochemotherapy For Severe Photoresponsive Dermatoses: Puva: The patient received Photochemotherapy for severe photoresponsive dermatoses: PUVA requiring at least 4 to 8 hours of care under direct physician supervision. Protocol For Photochemotherapy: Triamcinolone Ointment And Nbuvb: The patient received Photochemotherapy: Triamcinolone and NBUVB (triamcinolone ointment applied to all lesions prior to phototherapy). Protocol For Photochemotherapy: Mineral Oil And Broad Band Uvb: The patient received Photochemotherapy: Mineral Oil and Broad Band UVB. Protocol For Nbuvb: The patient received NBUVB. Protocol For Uva: The patient received UVA.

## 2023-03-10 NOTE — DISCHARGE INSTRUCTIONS
General Discharge Instructions    Patient ID:  Vadim Zapata  [id number]  60 y.o.  1960    Patient Instructions    Please take all your medications as prescribed, see your medication list for exact names and dosing. You have a follow up appointment with Germán Gallegos on Wednesday March 16th at 2:00PM.  Please follow up with your cardiologist Dr. Jim Anderson in 4 weeks. Remember that in order to keep your health & heart as best as they can be, you will need to stop using cocaine, and quit smoking. What to do at Home    Recommended diet: low salt diet, drink <2L of fluid per day    Recommended activity: as tolerated    Recommended Follow-Up Lab Studies: none    If you experience any recurrent shortness of breath or new chest pain, please follow up with your cardiologist immediately, or go straight to the ER to be evaluated. Follow-up with [address] on Wednesday March 16th at 2:00PM with Dr. Taty Stark.  Follow-up with Dr. Jim Anderson, your cardiologist, in 4 weeks. Information obtained by :  I understand that if any problems occur once I am at home I am to contact my physician. I understand and acknowledge receipt of the instructions indicated above.                                                                                                                                            Physician's or R.N.'s Signature                                                                  Date/Time                                                                                                                                              Patient or Representative Signature                                                          Date/Time

## 2023-03-10 NOTE — DISCHARGE SUMMARY
Rosanne Chang SUMMARY      Name:   Racheal Washburn 60 y.o. male  MRN:   [id number]  CSN:   [id number]  Admission Date:  3/9/2023  Discharge Date:  3/10/2023  Attending:             Betzaida Brown MD   PCP:              Tonie Garland MD   ================================================================  Reason for Admission:  Acute decompensated heart failure ([address] [I50.9]  Decompensated heart failure ([address] [I50.9]  Acute combined systolic and diastolic CHF, NYHA class 2 ([address] [I50.41]    Discharge Diagnosis:    Acute on Chronic Combined Systolic & Diastolic CHF  Polysubstance Abuse  HTN  HLD    Follow-up studies/evaluations for PCP/Important Notes to PCP:  Needs to follow up with his cardiologist Dr. Dmitriy Brantley 4 weeks from [place]  Reiterate need to stop using cocaine, and smoking cessation  Pending labs/investigations to follow up as below  Medication reconciliation:  Discontinued Medications: none  New Medications:   Jardiance 10mg daily  Spironolactone 25mg daily     VIGNESH Follow Up Appointment: Wednesday March 16th at 2:00PM with Dr. Joy Roca     Recommended follow-up after [facility] visit: Patient needs follow-up visit 6-8 weeks after [facility] appointment, then at physician discretion thereafter. Readmission prevention plan:   Close follow up with PFM and Cardiology, abstaining from drugs    GOALS OF CARE (including Code Status, [address]):    Full measures    Pending labs/ investigations at discharge to follow up:   none    Operative Procedures:   Cardiac Cath    Consultants:    Cardiology     Condition at discharge: Stable    Disposition at Discharge:  Home    Functional Status at Discharge: Ambulates independently    Diet: Cardiac diet    Discharge Medications:     Medication List        START taking these medications      empagliflozin 10 MG tablet  Commonly known as: JARDIANCE  Take 1 tablet by mouth daily  Start taking on: March 11, 2023     spironolactone 25 MG tablet  Commonly known as: ALDACTONE  Take 1 tablet by mouth daily  Start taking on: March 11, 2023            CHANGE how you take these medications      carvedilol 3.125 MG tablet  Commonly known as: COREG  Take 1 tablet by mouth 2 times daily (with meals)  What changed: when to take this     furosemide 20 MG tablet  Commonly known as: LASIX  Take 1 tablet by mouth every other day  Start taking on: March 11, 2023  What changed: when to take this     sacubitril-valsartan 24-26 MG per tablet  Commonly known as: ENTRESTO  Take 1 tablet by mouth 2 times daily  What changed: when to take this            CONTINUE taking these medications      aspirin 81 MG chewable tablet     atorvastatin 40 MG tablet  Commonly known as: LIPITOR  Take 1 tablet by mouth daily     isosorbide mononitrate 30 MG extended release tablet  Commonly known as: IMDUR  Take 1 tablet by mouth daily            STOP taking these medications      acetaminophen 325 MG tablet  Commonly known as: TYLENOL               Where to Get Your Medications        Information about where to get these medications is not yet available    Ask your nurse or doctor about these medications  carvedilol 3.125 MG tablet  empagliflozin 10 MG tablet  furosemide 20 MG tablet  sacubitril-valsartan 24-26 MG per tablet  spironolactone 25 MG tablet           Hospital Course:     Patient presented to the ED on 3/9/23 complaining of worsening SOB after running out of his home lasix 5 days prior. [place] [address]. Patient was tachycardic up to 30-40s requiring BiPAP. Trops were elevated to 410 but trended down to 351, these values are consistent with his baseline. EKG showed NSR with frequent PACs and no ST or T wave changes. He was given IV lasix in the ED and admitted to [facility] for acute on chronic CHF. Patient was diuresed with IV lasix 40 BID which he responded well to, however was not significantly fluid overloaded when he arrived.  His respiratory status quickly improved and he was saturating appropriately on room air later that same day. Cardiology was consulted and started the patient on Jardiance and spironolactone. He was taken for a right heart cath the following day with his cardiologist Dr. Jase Pederson, which showed no evidence of obstructive disease or elevated LVEDP. He was well post-procedure and was discharged to home.     Patient's problem list was managed in hospital as stated below:     Acute on Chronic HFrEF, Combined Systolic & Diastolic Dysfunction   Cardiomyopathy  HTN  -patient presented with worsening SOB after running out of Lasix 5 days prior  -pro-BNP 24729, BP at admission 157/128  -CXR unchanged from previous study; mild vascular congestion  -EKG sinus rhythm with frequent PAC's, no ST or T wave changes  -trops 410>351, previous in last year have ranged 374-547  -UDS (+) cocaine  -home meds: atorvastatin 40, carvedilol 3.125 BID, entresto 24-26 BID, furosemide 20, isosorbide mononitrate ER 30  -last echo (9/13/22): severely reduced LV systolic function, EF 69-18%, dilated LV, mild septal thickening, moderate posterior thickening, septal motion consistent with bundle branch block, global hypokinesis, grade II diastolic dysfunction with increase LAP, dilated aortic root (diameter 3.6cm)  -was diuresed with IV Lasix 40 BID, fluid restricted 2L, salt <2g per day  -home meds of ASA, carvedilol, entresto, imdur were continued, and per cardiology consult he was started on jardiance 10 and spironolactone 25  -prior to DC patient had a cardiac cath with Dr. Jase Pederson which showed no evidence of obstructive disease and no significant elevation of LVEDP  -advised to follow up with him in 4 weeks     COPD  Tobacco Use  -denies having any prior issues with his lungs other than smoking, no home inhalers/meds, not on O2 at baseline  -has been smoking on and off for 30 years  -currently smokes roughly 1 pack black & milds per week  -PRN duonebs ordered but did not need  -smoking cessation stressed     CKD IIIa  -Cr at admission 1.67 with eGFR 46, last Cr in office was 1.7 on 1/23/23 & previous in last year have ranged 1.4-1.6, so appears to be his baseline  -avoided nephrotoxic medications  -monitored Cr in setting of IV diuresis with daily BMP, remained stable     H/o CVA  -per patient has had two strokes, first in 1980's and second in 2000's  -no residual deficits   -home meds: ASA, atorvastatin - continued while admitted     Cocaine Abuse  -UDS (+) cocaine  -patient reports last use on Sunday  -reports occasional marijuana use, otherwise no other drug use   -counseled on need to step using cocaine in setting of CHF with EF 25-30%     Transaminitis  -AST at admission 46, ALT & alk phos wnl   -per chart review ALT/AST recently were 43/55 in office on 1/23/23  -per pt no h/o liver disease  -no hepatomegaly or RUQ pain on exam  -states he drinks a 6-pack of beer per week  -Incidental, can continue to monitor clinically in OP setting      Pertinent Results:      Laboratory Results:  LABORATORY RESULTS   HEMATOLOGY Lab Results   Component Value Date/Time    WBC 3.9 03/10/2023 12:30 AM    HGB 13.1 03/10/2023 12:30 AM    HCT 39.7 03/10/2023 12:30 AM     03/10/2023 12:30 AM    MCV 83.4 03/10/2023 12:30 AM       CHEMISTRIES Lab Results   Component Value Date/Time     03/10/2023 12:30 AM    K 3.6 03/10/2023 12:30 AM     03/10/2023 12:30 AM    CO2 25 03/10/2023 12:30 AM    BUN 27 03/10/2023 12:30 AM    GFRAA >60 09/14/2022 04:36 AM      HEPATIC FUNCTION Lab Results   Component Value Date/Time    GLOB 3.7 03/10/2023 12:30 AM    ALT 49 03/10/2023 12:30 AM       LACTIC ACID No components found for: [facility]   CARDIAC PANEL Lab Results   Component Value Date/Time    BNP 24,181 12/27/2022 08:16 AM      NT-proBNP Lab Results   Component Value Date/Time    BNP 24,181 12/27/2022 08:16 AM    BNP 12,138 09/13/2022 02:24 AM      THYROID No results found for: TSH, TSHEXT, TSH2, TSH3, TSHELE, T3RIA, T3UP, FT3, T3T, FT4, FT4P, T4, T4P, FT4T, TT7, [id number]         Readmission Risk Score: 11%      Isaac Thakur DO, PGY-1  [facility]  3/10/2023 3:39 PM

## 2023-03-10 NOTE — PROGRESS NOTES
[facility]  DAILY PROGRESS NOTE    **MEDICAL STUDENT DAILY PROGRESS NOTE**    Patient:    Malcom Nino , 60 y.o. male   MRN:  [id number]  Room/Bed:  360/01  Admission Date:   3/9/2023  Code status:  Full Code    Reason for Admission: Decompensated systolic chf with resp failure    ASSESSMENT AND PLAN:   Problem List Items Addressed This Visit          Circulatory    Acute combined systolic and diastolic CHF, NYHA class 2 ([address] - Primary    Relevant Medications    spironolactone (ALDACTONE) tablet 25 mg    empagliflozin (JARDIANCE) tablet 10 mg     Calixto Stiles is a 58yo male with a PMH of HFrEF, cardiomyopathy, HTN, COPD, tobacco use, cocaine use, CKD, h/o CVA x2 who presented to the ED with worsening shortness of breath, and was admitted for acute CHF exacerbation. HD#2 BP improving, continuing CHF decompensation management with cardiac recs. On room air oxygen requirments. Consider possible d/c today pending possible cardiac Cath.      Acute on Chronic HFrEF, Combined Systolic & Diastolic Dysfunction   Cardiomyopathy  HTN  -patient presented with worsening SOB after running out of Lasix 5 days ago  -pro-BNP 72712, BP at admission 157/128  -CXR unchanged from previous study; mild vascular congestion  -EKG sinus rhythm with frequent PAC's, no ST or T wave changes  -trops 410, previous in last year have ranged 374-547  -UDS (+) cocaine  -in ED received IV lasix, nitro paste, ASA, started on BiPAP  -home meds: atorvastatin 40, carvedilol 3.125 BID, entresto 24-26 BID, furosemide 20, isosorbide mononitrate ER 30  -last echo (9/13/22): severely reduced LV systolic function, EF 13-39%, dilated LV, mild septal thickening, moderate posterior thickening, septal motion consistent with bundle branch block, global hypokinesis, grade II diastolic dysfunction with increase LAP, dilated aortic root (diameter 3.6cm)  -Dr. Adali Rey is patient's cardiologist, last appt was 2/13  -patient received IV lasix 40mg x1, nitro paste, ASA 162mg in ED and was started on BiPAP for increased SOB with RR elevated to 30-40's  -trops downtrendin>351, now roughly at baseline  -Consult cardiology  Plan:  -Per cardiology recommendations: start aldactone 25 mg PO qd, Jardiance 10 mg qd and continue coreg 3.125 bid, imdur 30 mg po qd,  entresto 24-26 mg bid, lasix 40 mg IV bid, and aspirin 81 mg qd  - fluid restrication 2L/day, salt intake <2g per day  -continue BiPAP, wean as tolerated, NPO while pt remains on BiPAP  -monitor vitals, BP elevated this AM prior to receiving home meds, will monitor response to meds, can titrate dosing if indicated   -has PRN nitro on board, given trops downtrending and now at baseline can stop trending   -daily CBC, CMP, Mg     COPD  Tobacco Use  -denies having any prior issues with his lungs other than smoking, no home inhalers/meds, not on O2 at baseline  -has been smoking on and off for 30 years  -currently smokes roughly 1 pack black & milds per week  Plan:  -continue supplemental O2, PRN duoneb  -smoking cessation  -Nicotine replacement if needed     CKD IIIa  -Cr at admission 1.67 with eGFR 46, last Cr in office was 1.7 on 23 & previous in last year have ranged 1.4-1.6, so appears to be his baseline  Plan:  -avoid nephrotoxic medications  -monitor Cr in setting of IV diuresis   -daily CMP     H/o CVA  -per patient has had two strokes, first in 's and second in 's  -no residual deficits   -home meds: ASA, atorvastatin  Plan:  -continue ASA, hold atorvastatin for now given transaminitis      Cocaine Abuse  -UDS (+) cocaine  -patient reports last use on   -reports occasional marijuana use, otherwise no other drug use   Plan:  -counseled on need to step using cocaine in setting of CHF with EF 25-30%     Transaminitis  -AST at admission 46, ALT & alk phos wnl   -per chart review ALT/AST recently were 43/55 in office on 23  -per pt no h/o liver disease  -no hepatomegaly or RUQ pain on exam  -states he drinks a 6-pack of beer per week  Plan:  -Incidental, will monitor clinically     Global:  Code: Full  IVF/Drips: na  I/O / Wt: weight at admission 170 lbs  Diet: NPO while on BiPAP  Bowel Regimen, Last BM: miralax PRN  DVT/AC: lovenox  Mobility: per protocol   Disposition: stable  Anticipated LOS: 2 nights     Point of Contact: Clive Whipple (sister) [phone]        SUBJECTIVE:   Events of the last 24 hours:    Evaluated by cardiology who recommends:   Continue on aspirin 81 mg p.o. daily  Monitor blood pressure, adjust antihypertensive medications as necessary. Continue Coreg 3.125 mg p.o. twice daily, Imdur 30 mg p.o. daily  Statin if can tolerate. Continue Lipitor 40 mg p.o. at bedtime  Monitor fluid status, adjust as necessary, fluid restrication 2L/day, salt intake <2g per day. On long-acting beta-blocker Coreg, Entresto 24-26 mg p.o. twice daily. Start Aldactone 25 mg p.o. daily, start Jardiance 10 mg p.o. daily. 5.   Recommend ischemic evaluation for reduced ejection fraction at some  point, primary cardiologist Dr. Roberto Epley. No acute events overnight. Patient seen at beside. No acute complaints. Adequate urine output. No cp, or sob. Overall feels much better.      ROS (positive findings are in BOLD; negative findings are in regular font)  Constitutional: fevers, chills, appetite changes, weight changes, fatigue  HEENT: changes in vision, changes in hearing, sore throat, dysphagia  Cardiovascular: chest pain, palpitations, PND, orthopnea, edema  Pulmonary: SOB, cough, sputum production, wheezing, chest tightness  Gastrointestinal: abdominal pain, nausea/vomiting, diarrhea, constipation, melena, hematochezia  Genitourinary: dysuria, hesitation, dribbling, urgency, hematuria  Musculoskeletal: arthralgias, myalgias  Skin: rash, itching  Neurological: sensory changes, motor changes, headache  Psychiatric: mood changes  Endocrine: heat/cold intolerance  Heme: easy bruising/easy bleeding, LAD    CURRENT INPATIENT MEDICATIONS:  Current Facility-Administered Medications   Medication Dose Route Frequency Provider Last Rate Last Admin    sodium chloride flush 0.9 % injection 5-40 mL  5-40 mL IntraVENous 2 times per day Anne [place], DO   5 mL at 03/09/23 2158    sodium chloride flush 0.9 % injection 5-40 mL  5-40 mL IntraVENous PRN Anne [place], DO        0.9 % sodium chloride infusion   IntraVENous PRN Anne [place], DO        ondansetron (ZOFRAN-ODT) disintegrating tablet 4 mg  4 mg Oral Q8H PRN Anne [place], DO        Or    ondansetron (ZOFRAN) injection 4 mg  4 mg IntraVENous Q6H PRN Anne [place], DO        polyethylene glycol (GLYCOLAX) packet 17 g  17 g Oral Daily PRN Anne [place], DO        acetaminophen (TYLENOL) tablet 650 mg  650 mg Oral Q6H PRN Anne [place], DO        Or    acetaminophen (TYLENOL) suppository 650 mg  650 mg Rectal Q6H PRN Anne [place], DO        enoxaparin (LOVENOX) injection 40 mg  40 mg SubCUTAneous Daily Anne [place], DO   40 mg at 03/09/23 0845    potassium chloride (KLOR-CON M) extended release tablet 40 mEq  40 mEq Oral PRN Anne [place], DO        Or    potassium bicarb-citric acid (EFFER-K) effervescent tablet 40 mEq  40 mEq Oral PRN Anne [place], DO        Or    potassium chloride 10 mEq/100 mL IVPB (Peripheral Line)  10 mEq IntraVENous PRN Anne [place], DO        magnesium sulfate 2000 mg in 50 mL IVPB premix  2,000 mg IntraVENous PRN Anne [place], DO        carvedilol (COREG) tablet 3.125 mg  3.125 mg Oral BID  Ivet Cohen, DO   3.125 mg at 03/09/23 1712    sacubitril-valsartan (ENTRESTO) 24-26 MG per tablet 1 tablet  1 tablet Oral BID Anne [place], DO   1 tablet at 03/09/23 1717    furosemide (LASIX) injection 40 mg  40 mg IntraVENous BID Anne [place], DO   40 mg at 03/09/23 1713    nitroGLYCERIN (NITROSTAT) SL tablet 0.4 mg  0.4 mg SubLINGual Q5 Min PRN Anne [place], DO        aspirin chewable tablet 81 mg  81 mg Oral Daily Marden Stank, DO   81 mg at 03/09/23 0844    isosorbide mononitrate (IMDUR) extended release tablet 30 mg  30 mg Oral Daily Marden Stank, DO   30 mg at 03/09/23 0845    atorvastatin (LIPITOR) tablet 40 mg  40 mg Oral Daily Marden Stank, DO   40 mg at 03/09/23 1712    ipratropium-albuterol (DUONEB) nebulizer solution 1 ampule  1 ampule Inhalation Q4H PRN Abbie Stank, DO        spironolactone (ALDACTONE) tablet 25 mg  25 mg Oral Daily Lonny Rivera MD   25 mg at 03/09/23 2026    empagliflozin (JARDIANCE) tablet 10 mg  10 mg Oral Daily Lonny Rivera MD   10 mg at 03/09/23 2026       Allergies  No Known Allergies    OBJECTIVE:    Intake/Output Summary (Last 24 hours) at 3/10/2023 0634  Last data filed at 3/9/2023 2335  Gross per 24 hour   Intake 360 ml   Output 1220 ml   Net -860 ml       BP (!) 136/90   Pulse 70   Temp 97.8 °F (36.6 °C) (Oral)   Resp 17   Ht 5' 8\" (1.727 m)   Wt 170 lb (77.1 kg)   SpO2 93%   BMI 25.85 kg/m²     PHYSICAL EXAM  Gen: NAD, cooperative  HEENT: normocephalic, atraumatic, MMM, no thyromegaly, no JVD  CV: RRR, S1/S2 present without M/R/G, +2 radial pulses, well-perfused, PMI not displaced  Pulm: CTAB, no wheezes, no crackles  Abd: S/NT/ND, no rebound, no guarding, no hepatosplenomegaly   MSK: no clubbing, no edema  Skin: warm, dry, intact, no rash  Neuro: CN II-XII grossly intact, no focal deficits appreciated   Psych: appropriate, alert, oriented x3    LABWORK (LAST 24 HOURS)  Recent Results (from the past 24 hour(s))   Magnesium    Collection Time: 03/10/23 12:30 AM   Result Value Ref Range    Magnesium 1.7 1.6 - 2.6 mg/dL   CBC with Auto Differential    Collection Time: 03/10/23 12:30 AM   Result Value Ref Range    WBC 3.9 (L) 4.6 - 13.2 K/uL    RBC 4.76 4.35 - 5.65 M/uL    Hemoglobin 13.1 13.0 - 16.0 g/dL    Hematocrit 39.7 36.0 - 48.0 %    MCV 83.4 78.0 - 100.0 FL    MCH 27.5 24.0 - 34.0 PG    MCHC 33.0 31.0 - 37.0 g/dL    RDW 12.5 11.6 - 14.5 %    Platelets 269 135 - 420 K/uL    MPV 10.9 9.2 - 11.8 FL    Nucleated RBCs 0.0 0  WBC    nRBC 0.00 0.00 - 0.01 K/uL    Seg Neutrophils 42 40 - 73 %    Lymphocytes 42 21 - 52 %    Monocytes 14 (H) 3 - 10 %    Eosinophils % 1 0 - 5 %    Basophils 0 0 - 2 %    Immature Granulocytes 0 0.0 - 0.5 %    Segs Absolute 1.6 (L) 1.8 - 8.0 K/UL    Absolute Lymph # 1.6 0.9 - 3.6 K/UL    Absolute Mono # 0.6 0.05 - 1.2 K/UL    Absolute Eos # 0.0 0.0 - 0.4 K/UL    Basophils Absolute 0.0 0.0 - 0.1 K/UL    Absolute Immature Granulocyte 0.0 0.00 - 0.04 K/UL    Differential Type AUTOMATED     Comprehensive Metabolic Panel    Collection Time: 03/10/23 12:30 AM   Result Value Ref Range    Sodium 135 (L) 136 - 145 mmol/L    Potassium 3.6 3.5 - 5.5 mmol/L    Chloride 101 100 - 111 mmol/L    CO2 25 21 - 32 mmol/L    Anion Gap 9 3.0 - 18 mmol/L    Glucose 106 (H) 74 - 99 mg/dL    BUN 27 (H) 7.0 - 18 MG/DL    Creatinine 1.69 (H) 0.6 - 1.3 MG/DL    Bun/Cre Ratio 16 12 - 20      Est, Glom Filt Rate 45 (L) >60 ml/min/1.73m2    Calcium 8.6 8.5 - 10.1 MG/DL    Total Bilirubin 0.6 0.2 - 1.0 MG/DL    ALT 49 16 - 61 U/L    AST 44 (H) 10 - 38 U/L    Alk Phosphatase 77 45 - 117 U/L    Total Protein 6.9 6.4 - 8.2 g/dL    Albumin 3.2 (L) 3.4 - 5.0 g/dL    Globulin 3.7 2.0 - 4.0 g/dL    Albumin/Globulin Ratio 0.9 0.8 - 1.7         IMAGING AND PROCEDURES (LAST 24 HOURS)  XR CHEST PORTABLE    Result Date: 3/9/2023  Cardiac enlargement with mild pulmonary vascular congestion. Mild pulmonary interstitial edema versus nonspecific bronchitis.       ================================================================  Further management for Mr. Phong Lucas will be discussed on rounds with my attending.      Favio Guerrero, MS3  [facility]  March 10, 2023 6:34 AM

## 2023-04-16 ENCOUNTER — HOSPITAL ENCOUNTER (INPATIENT)
Facility: HOSPITAL | Age: 63
LOS: 2 days | Discharge: HOME | DRG: 291 | End: 2023-04-18
Attending: EMERGENCY MEDICINE | Admitting: STUDENT IN AN ORGANIZED HEALTH CARE EDUCATION/TRAINING PROGRAM
Payer: COMMERCIAL

## 2023-04-16 ENCOUNTER — APPOINTMENT (OUTPATIENT)
Facility: HOSPITAL | Age: 63
DRG: 291 | End: 2023-04-16
Payer: COMMERCIAL

## 2023-04-16 DIAGNOSIS — I50.21 ACUTE SYSTOLIC CHF (CONGESTIVE HEART FAILURE) (HCC): ICD-10-CM

## 2023-04-16 DIAGNOSIS — I50.9 ACUTE DECOMPENSATED HEART FAILURE (HCC): Primary | ICD-10-CM

## 2023-04-16 DIAGNOSIS — I50.43 ACUTE ON CHRONIC HEART FAILURE WITH REDUCED EJECTION FRACTION AND DIASTOLIC DYSFUNCTION (HCC): ICD-10-CM

## 2023-04-16 LAB
ALBUMIN SERPL-MCNC: 3.2 G/DL (ref 3.4–5)
ALBUMIN/GLOB SERPL: 0.9 (ref 0.8–1.7)
ALP SERPL-CCNC: 82 U/L (ref 45–117)
ALT SERPL-CCNC: 213 U/L (ref 16–61)
ANION GAP SERPL CALC-SCNC: 5 MMOL/L (ref 3–18)
AST SERPL-CCNC: 81 U/L (ref 10–38)
BASOPHILS # BLD: 0 K/UL (ref 0–0.1)
BASOPHILS NFR BLD: 0 % (ref 0–2)
BILIRUB SERPL-MCNC: 0.8 MG/DL (ref 0.2–1)
BUN SERPL-MCNC: 17 MG/DL (ref 7–18)
BUN/CREAT SERPL: 11 (ref 12–20)
CALCIUM SERPL-MCNC: 9 MG/DL (ref 8.5–10.1)
CHLORIDE SERPL-SCNC: 110 MMOL/L (ref 100–111)
CO2 SERPL-SCNC: 26 MMOL/L (ref 21–32)
CREAT SERPL-MCNC: 1.54 MG/DL (ref 0.6–1.3)
DIFFERENTIAL METHOD BLD: ABNORMAL
EOSINOPHIL # BLD: 0 K/UL (ref 0–0.4)
EOSINOPHIL NFR BLD: 0 % (ref 0–5)
ERYTHROCYTE [DISTWIDTH] IN BLOOD BY AUTOMATED COUNT: 14.6 % (ref 11.6–14.5)
GLOBULIN SER CALC-MCNC: 3.6 G/DL (ref 2–4)
GLUCOSE SERPL-MCNC: 138 MG/DL (ref 74–99)
HCT VFR BLD AUTO: 37 % (ref 36–48)
HGB BLD-MCNC: 11.9 G/DL (ref 13–16)
IMM GRANULOCYTES # BLD AUTO: 0 K/UL (ref 0–0.04)
IMM GRANULOCYTES NFR BLD AUTO: 0 % (ref 0–0.5)
LYMPHOCYTES # BLD: 1.6 K/UL (ref 0.9–3.6)
LYMPHOCYTES NFR BLD: 28 % (ref 21–52)
MAGNESIUM SERPL-MCNC: 1.7 MG/DL (ref 1.6–2.6)
MCH RBC QN AUTO: 28.3 PG (ref 24–34)
MCHC RBC AUTO-ENTMCNC: 32.2 G/DL (ref 31–37)
MCV RBC AUTO: 87.9 FL (ref 78–100)
MONOCYTES # BLD: 0.8 K/UL (ref 0.05–1.2)
MONOCYTES NFR BLD: 14 % (ref 3–10)
NEUTS SEG # BLD: 3.2 K/UL (ref 1.8–8)
NEUTS SEG NFR BLD: 57 % (ref 40–73)
NRBC # BLD: 0 K/UL (ref 0–0.01)
NRBC BLD-RTO: 0 PER 100 WBC
NT PRO BNP: ABNORMAL PG/ML (ref 0–900)
PLATELET # BLD AUTO: 293 K/UL (ref 135–420)
PMV BLD AUTO: 10.4 FL (ref 9.2–11.8)
POTASSIUM SERPL-SCNC: 4.3 MMOL/L (ref 3.5–5.5)
PROT SERPL-MCNC: 6.8 G/DL (ref 6.4–8.2)
RBC # BLD AUTO: 4.21 M/UL (ref 4.35–5.65)
SODIUM SERPL-SCNC: 141 MMOL/L (ref 136–145)
TROPONIN I SERPL HS-MCNC: 202 NG/L (ref 0–78)
WBC # BLD AUTO: 5.7 K/UL (ref 4.6–13.2)

## 2023-04-16 PROCEDURE — 96374 THER/PROPH/DIAG INJ IV PUSH: CPT

## 2023-04-16 PROCEDURE — 6360000002 HC RX W HCPCS

## 2023-04-16 PROCEDURE — 83880 ASSAY OF NATRIURETIC PEPTIDE: CPT

## 2023-04-16 PROCEDURE — 6370000000 HC RX 637 (ALT 250 FOR IP)

## 2023-04-16 PROCEDURE — 80053 COMPREHEN METABOLIC PANEL: CPT

## 2023-04-16 PROCEDURE — 99285 EMERGENCY DEPT VISIT HI MDM: CPT

## 2023-04-16 PROCEDURE — 93005 ELECTROCARDIOGRAM TRACING: CPT | Performed by: FAMILY MEDICINE

## 2023-04-16 PROCEDURE — 2580000003 HC RX 258

## 2023-04-16 PROCEDURE — 85025 COMPLETE CBC W/AUTO DIFF WBC: CPT

## 2023-04-16 PROCEDURE — 1100000003 HC PRIVATE W/ TELEMETRY

## 2023-04-16 PROCEDURE — 83735 ASSAY OF MAGNESIUM: CPT

## 2023-04-16 PROCEDURE — 6370000000 HC RX 637 (ALT 250 FOR IP): Performed by: EMERGENCY MEDICINE

## 2023-04-16 PROCEDURE — 96375 TX/PRO/DX INJ NEW DRUG ADDON: CPT

## 2023-04-16 PROCEDURE — 93005 ELECTROCARDIOGRAM TRACING: CPT | Performed by: EMERGENCY MEDICINE

## 2023-04-16 PROCEDURE — 84484 ASSAY OF TROPONIN QUANT: CPT

## 2023-04-16 PROCEDURE — 71045 X-RAY EXAM CHEST 1 VIEW: CPT

## 2023-04-16 PROCEDURE — 6360000002 HC RX W HCPCS: Performed by: EMERGENCY MEDICINE

## 2023-04-16 RX ORDER — ACETAMINOPHEN 325 MG/1
650 TABLET ORAL EVERY 6 HOURS PRN
Status: DISCONTINUED | OUTPATIENT
Start: 2023-04-16 | End: 2023-04-18 | Stop reason: HOSPADM

## 2023-04-16 RX ORDER — CARVEDILOL 3.12 MG/1
3.12 TABLET ORAL 2 TIMES DAILY WITH MEALS
Status: DISCONTINUED | OUTPATIENT
Start: 2023-04-16 | End: 2023-04-17

## 2023-04-16 RX ORDER — ONDANSETRON 4 MG/1
4 TABLET, ORALLY DISINTEGRATING ORAL EVERY 8 HOURS PRN
Status: DISCONTINUED | OUTPATIENT
Start: 2023-04-16 | End: 2023-04-16

## 2023-04-16 RX ORDER — SPIRONOLACTONE 25 MG/1
25 TABLET ORAL DAILY
Status: DISCONTINUED | OUTPATIENT
Start: 2023-04-17 | End: 2023-04-18 | Stop reason: HOSPADM

## 2023-04-16 RX ORDER — ISOSORBIDE MONONITRATE 30 MG/1
30 TABLET, EXTENDED RELEASE ORAL DAILY
Status: DISCONTINUED | OUTPATIENT
Start: 2023-04-17 | End: 2023-04-18 | Stop reason: HOSPADM

## 2023-04-16 RX ORDER — FUROSEMIDE 20 MG/1
20 TABLET ORAL DAILY
Status: DISCONTINUED | OUTPATIENT
Start: 2023-04-16 | End: 2023-04-16

## 2023-04-16 RX ORDER — ASPIRIN 81 MG/1
81 TABLET ORAL DAILY
Status: DISCONTINUED | OUTPATIENT
Start: 2023-04-17 | End: 2023-04-18 | Stop reason: HOSPADM

## 2023-04-16 RX ORDER — MAGNESIUM SULFATE IN WATER 40 MG/ML
2000 INJECTION, SOLUTION INTRAVENOUS PRN
Status: DISCONTINUED | OUTPATIENT
Start: 2023-04-16 | End: 2023-04-18 | Stop reason: HOSPADM

## 2023-04-16 RX ORDER — SODIUM CHLORIDE 9 MG/ML
INJECTION, SOLUTION INTRAVENOUS PRN
Status: DISCONTINUED | OUTPATIENT
Start: 2023-04-16 | End: 2023-04-18 | Stop reason: HOSPADM

## 2023-04-16 RX ORDER — ATORVASTATIN CALCIUM 40 MG/1
40 TABLET, FILM COATED ORAL NIGHTLY
Status: DISCONTINUED | OUTPATIENT
Start: 2023-04-17 | End: 2023-04-18 | Stop reason: HOSPADM

## 2023-04-16 RX ORDER — ONDANSETRON 2 MG/ML
4 INJECTION INTRAMUSCULAR; INTRAVENOUS EVERY 6 HOURS PRN
Status: DISCONTINUED | OUTPATIENT
Start: 2023-04-16 | End: 2023-04-16

## 2023-04-16 RX ORDER — MORPHINE SULFATE 4 MG/ML
4 INJECTION, SOLUTION INTRAMUSCULAR; INTRAVENOUS
Status: COMPLETED | OUTPATIENT
Start: 2023-04-16 | End: 2023-04-16

## 2023-04-16 RX ORDER — ISOSORBIDE MONONITRATE 30 MG/1
30 TABLET, EXTENDED RELEASE ORAL DAILY
Status: DISCONTINUED | OUTPATIENT
Start: 2023-04-16 | End: 2023-04-16

## 2023-04-16 RX ORDER — SODIUM CHLORIDE 0.9 % (FLUSH) 0.9 %
5-40 SYRINGE (ML) INJECTION EVERY 12 HOURS SCHEDULED
Status: DISCONTINUED | OUTPATIENT
Start: 2023-04-16 | End: 2023-04-18 | Stop reason: HOSPADM

## 2023-04-16 RX ORDER — SPIRONOLACTONE 25 MG/1
25 TABLET ORAL DAILY
Status: DISCONTINUED | OUTPATIENT
Start: 2023-04-16 | End: 2023-04-16

## 2023-04-16 RX ORDER — SODIUM CHLORIDE 0.9 % (FLUSH) 0.9 %
5-40 SYRINGE (ML) INJECTION PRN
Status: DISCONTINUED | OUTPATIENT
Start: 2023-04-16 | End: 2023-04-18 | Stop reason: HOSPADM

## 2023-04-16 RX ORDER — POTASSIUM CHLORIDE 20 MEQ/1
40 TABLET, EXTENDED RELEASE ORAL PRN
Status: DISCONTINUED | OUTPATIENT
Start: 2023-04-16 | End: 2023-04-18 | Stop reason: HOSPADM

## 2023-04-16 RX ORDER — ATORVASTATIN CALCIUM 40 MG/1
40 TABLET, FILM COATED ORAL NIGHTLY
Status: DISCONTINUED | OUTPATIENT
Start: 2023-04-16 | End: 2023-04-16

## 2023-04-16 RX ORDER — IPRATROPIUM BROMIDE AND ALBUTEROL SULFATE 2.5; .5 MG/3ML; MG/3ML
1 SOLUTION RESPIRATORY (INHALATION) EVERY 4 HOURS PRN
Status: DISCONTINUED | OUTPATIENT
Start: 2023-04-16 | End: 2023-04-18 | Stop reason: HOSPADM

## 2023-04-16 RX ORDER — POTASSIUM CHLORIDE 7.45 MG/ML
10 INJECTION INTRAVENOUS PRN
Status: DISCONTINUED | OUTPATIENT
Start: 2023-04-16 | End: 2023-04-18 | Stop reason: HOSPADM

## 2023-04-16 RX ORDER — ASPIRIN 81 MG/1
81 TABLET ORAL DAILY
Status: DISCONTINUED | OUTPATIENT
Start: 2023-04-16 | End: 2023-04-16

## 2023-04-16 RX ORDER — ENOXAPARIN SODIUM 100 MG/ML
40 INJECTION SUBCUTANEOUS EVERY EVENING
Status: DISCONTINUED | OUTPATIENT
Start: 2023-04-16 | End: 2023-04-18 | Stop reason: HOSPADM

## 2023-04-16 RX ORDER — POLYETHYLENE GLYCOL 3350 17 G/17G
17 POWDER, FOR SOLUTION ORAL DAILY PRN
Status: DISCONTINUED | OUTPATIENT
Start: 2023-04-16 | End: 2023-04-18 | Stop reason: HOSPADM

## 2023-04-16 RX ORDER — FUROSEMIDE 20 MG/1
20 TABLET ORAL DAILY
Status: DISCONTINUED | OUTPATIENT
Start: 2023-04-17 | End: 2023-04-17

## 2023-04-16 RX ORDER — FUROSEMIDE 10 MG/ML
20 INJECTION INTRAMUSCULAR; INTRAVENOUS
Status: COMPLETED | OUTPATIENT
Start: 2023-04-16 | End: 2023-04-16

## 2023-04-16 RX ADMIN — CARVEDILOL 3.12 MG: 3.12 TABLET, FILM COATED ORAL at 20:56

## 2023-04-16 RX ADMIN — SODIUM CHLORIDE, PRESERVATIVE FREE 10 ML: 5 INJECTION INTRAVENOUS at 20:57

## 2023-04-16 RX ADMIN — NITROGLYCERIN 0.5 INCH: 20 OINTMENT TOPICAL at 17:06

## 2023-04-16 RX ADMIN — FUROSEMIDE 20 MG: 10 INJECTION, SOLUTION INTRAMUSCULAR; INTRAVENOUS at 17:02

## 2023-04-16 RX ADMIN — MORPHINE SULFATE 4 MG: 4 INJECTION, SOLUTION INTRAMUSCULAR; INTRAVENOUS at 17:00

## 2023-04-16 RX ADMIN — ENOXAPARIN SODIUM 40 MG: 100 INJECTION SUBCUTANEOUS at 20:56

## 2023-04-16 RX ADMIN — SACUBITRIL AND VALSARTAN 1 TABLET: 24; 26 TABLET, FILM COATED ORAL at 20:56

## 2023-04-16 RX ADMIN — MORPHINE SULFATE 4 MG: 4 INJECTION, SOLUTION INTRAMUSCULAR; INTRAVENOUS at 20:56

## 2023-04-16 ASSESSMENT — PAIN DESCRIPTION - ORIENTATION: ORIENTATION: RIGHT

## 2023-04-16 ASSESSMENT — PAIN SCALES - GENERAL
PAINLEVEL_OUTOF10: 2
PAINLEVEL_OUTOF10: 8

## 2023-04-16 ASSESSMENT — PAIN DESCRIPTION - LOCATION: LOCATION: CHEST

## 2023-04-17 PROBLEM — Z86.73 HISTORY OF CVA (CEREBROVASCULAR ACCIDENT): Status: ACTIVE | Noted: 2023-04-17

## 2023-04-17 PROBLEM — I21.4 NSTEMI (NON-ST ELEVATED MYOCARDIAL INFARCTION) (HCC): Status: ACTIVE | Noted: 2023-04-17

## 2023-04-17 LAB
-: ABNORMAL
ALBUMIN SERPL-MCNC: 3.2 G/DL (ref 3.4–5)
ALBUMIN/GLOB SERPL: 0.8 (ref 0.8–1.7)
ALP SERPL-CCNC: 76 U/L (ref 45–117)
ALT SERPL-CCNC: 178 U/L (ref 16–61)
AMPHET UR QL SCN: NEGATIVE
ANION GAP SERPL CALC-SCNC: 5 MMOL/L (ref 3–18)
AST SERPL-CCNC: 61 U/L (ref 10–38)
BARBITURATES UR QL SCN: NEGATIVE
BASOPHILS # BLD: 0 K/UL (ref 0–0.1)
BASOPHILS NFR BLD: 0 % (ref 0–2)
BENZODIAZ UR QL: NEGATIVE
BILIRUB SERPL-MCNC: 0.8 MG/DL (ref 0.2–1)
BUN SERPL-MCNC: 17 MG/DL (ref 7–18)
BUN/CREAT SERPL: 12 (ref 12–20)
CALCIUM SERPL-MCNC: 9 MG/DL (ref 8.5–10.1)
CANNABINOIDS UR QL SCN: NEGATIVE
CHLORIDE SERPL-SCNC: 107 MMOL/L (ref 100–111)
CO2 SERPL-SCNC: 24 MMOL/L (ref 21–32)
COCAINE UR QL SCN: POSITIVE
CREAT SERPL-MCNC: 1.41 MG/DL (ref 0.6–1.3)
DIFFERENTIAL METHOD BLD: ABNORMAL
EOSINOPHIL # BLD: 0 K/UL (ref 0–0.4)
EOSINOPHIL NFR BLD: 0 % (ref 0–5)
ERYTHROCYTE [DISTWIDTH] IN BLOOD BY AUTOMATED COUNT: 14.3 % (ref 11.6–14.5)
FERRITIN SERPL-MCNC: 140 NG/ML (ref 8–388)
GLOBULIN SER CALC-MCNC: 4 G/DL (ref 2–4)
GLUCOSE SERPL-MCNC: 170 MG/DL (ref 74–99)
HAV IGM SER QL: NEGATIVE
HBV CORE IGM SER QL: NEGATIVE
HBV SURFACE AG SER QL: <0.1 INDEX
HBV SURFACE AG SER QL: NEGATIVE
HCT VFR BLD AUTO: 34.3 % (ref 36–48)
HCV AB SER IA-ACNC: >11 INDEX
HCV AB SERPL QL IA: POSITIVE
HGB BLD-MCNC: 11.4 G/DL (ref 13–16)
IMM GRANULOCYTES # BLD AUTO: 0 K/UL (ref 0–0.04)
IMM GRANULOCYTES NFR BLD AUTO: 0 % (ref 0–0.5)
IRON SATN MFR SERPL: 6 % (ref 20–50)
IRON SERPL-MCNC: 22 UG/DL (ref 50–175)
LYMPHOCYTES # BLD: 1.3 K/UL (ref 0.9–3.6)
LYMPHOCYTES NFR BLD: 20 % (ref 21–52)
Lab: ABNORMAL
Lab: ABNORMAL
MCH RBC QN AUTO: 28.1 PG (ref 24–34)
MCHC RBC AUTO-ENTMCNC: 33.2 G/DL (ref 31–37)
MCV RBC AUTO: 84.5 FL (ref 78–100)
METHADONE UR QL: NEGATIVE
MONOCYTES # BLD: 0.8 K/UL (ref 0.05–1.2)
MONOCYTES NFR BLD: 12 % (ref 3–10)
NEUTS SEG # BLD: 4.5 K/UL (ref 1.8–8)
NEUTS SEG NFR BLD: 67 % (ref 40–73)
NRBC # BLD: 0 K/UL (ref 0–0.01)
NRBC BLD-RTO: 0 PER 100 WBC
OPIATES UR QL: POSITIVE
PCP UR QL: NEGATIVE
PLATELET # BLD AUTO: 275 K/UL (ref 135–420)
PMV BLD AUTO: 11.1 FL (ref 9.2–11.8)
POTASSIUM SERPL-SCNC: 3.6 MMOL/L (ref 3.5–5.5)
PROT SERPL-MCNC: 7.2 G/DL (ref 6.4–8.2)
RBC # BLD AUTO: 4.06 M/UL (ref 4.35–5.65)
SODIUM SERPL-SCNC: 136 MMOL/L (ref 136–145)
TIBC SERPL-MCNC: 372 UG/DL (ref 250–450)
TROPONIN I SERPL HS-MCNC: 195 NG/L (ref 0–78)
WBC # BLD AUTO: 6.7 K/UL (ref 4.6–13.2)

## 2023-04-17 PROCEDURE — 84484 ASSAY OF TROPONIN QUANT: CPT

## 2023-04-17 PROCEDURE — 99222 1ST HOSP IP/OBS MODERATE 55: CPT | Performed by: INTERNAL MEDICINE

## 2023-04-17 PROCEDURE — 1100000003 HC PRIVATE W/ TELEMETRY

## 2023-04-17 PROCEDURE — 6360000002 HC RX W HCPCS: Performed by: PHYSICIAN ASSISTANT

## 2023-04-17 PROCEDURE — 83540 ASSAY OF IRON: CPT

## 2023-04-17 PROCEDURE — 85025 COMPLETE CBC W/AUTO DIFF WBC: CPT

## 2023-04-17 PROCEDURE — 87522 HEPATITIS C REVRS TRNSCRPJ: CPT

## 2023-04-17 PROCEDURE — 6370000000 HC RX 637 (ALT 250 FOR IP): Performed by: PHYSICIAN ASSISTANT

## 2023-04-17 PROCEDURE — 80074 ACUTE HEPATITIS PANEL: CPT

## 2023-04-17 PROCEDURE — 6370000000 HC RX 637 (ALT 250 FOR IP)

## 2023-04-17 PROCEDURE — 6360000002 HC RX W HCPCS

## 2023-04-17 PROCEDURE — 97165 OT EVAL LOW COMPLEX 30 MIN: CPT

## 2023-04-17 PROCEDURE — 81256 HFE GENE: CPT

## 2023-04-17 PROCEDURE — 80053 COMPREHEN METABOLIC PANEL: CPT

## 2023-04-17 PROCEDURE — 97162 PT EVAL MOD COMPLEX 30 MIN: CPT

## 2023-04-17 PROCEDURE — 2700000000 HC OXYGEN THERAPY PER DAY

## 2023-04-17 PROCEDURE — 82728 ASSAY OF FERRITIN: CPT

## 2023-04-17 PROCEDURE — 80307 DRUG TEST PRSMV CHEM ANLYZR: CPT

## 2023-04-17 PROCEDURE — 87902 NFCT AGT GNTYP ALYS HEP C: CPT

## 2023-04-17 PROCEDURE — 2580000003 HC RX 258

## 2023-04-17 PROCEDURE — 36415 COLL VENOUS BLD VENIPUNCTURE: CPT

## 2023-04-17 RX ORDER — HYDRALAZINE HYDROCHLORIDE 20 MG/ML
10 INJECTION INTRAMUSCULAR; INTRAVENOUS ONCE
Status: COMPLETED | OUTPATIENT
Start: 2023-04-17 | End: 2023-04-17

## 2023-04-17 RX ORDER — FUROSEMIDE 40 MG/1
40 TABLET ORAL DAILY
Status: DISCONTINUED | OUTPATIENT
Start: 2023-04-18 | End: 2023-04-18 | Stop reason: HOSPADM

## 2023-04-17 RX ORDER — CARVEDILOL 6.25 MG/1
6.25 TABLET ORAL 2 TIMES DAILY WITH MEALS
Status: DISCONTINUED | OUTPATIENT
Start: 2023-04-17 | End: 2023-04-18 | Stop reason: HOSPADM

## 2023-04-17 RX ORDER — FUROSEMIDE 10 MG/ML
20 INJECTION INTRAMUSCULAR; INTRAVENOUS ONCE
Status: COMPLETED | OUTPATIENT
Start: 2023-04-17 | End: 2023-04-17

## 2023-04-17 RX ADMIN — ASPIRIN 81 MG: 81 TABLET, COATED ORAL at 08:51

## 2023-04-17 RX ADMIN — FUROSEMIDE 20 MG: 10 INJECTION, SOLUTION INTRAMUSCULAR; INTRAVENOUS at 16:18

## 2023-04-17 RX ADMIN — ENOXAPARIN SODIUM 40 MG: 100 INJECTION SUBCUTANEOUS at 18:09

## 2023-04-17 RX ADMIN — EMPAGLIFLOZIN 10 MG: 10 TABLET, FILM COATED ORAL at 08:51

## 2023-04-17 RX ADMIN — HYDRALAZINE HYDROCHLORIDE 10 MG: 20 INJECTION INTRAMUSCULAR; INTRAVENOUS at 00:55

## 2023-04-17 RX ADMIN — SACUBITRIL AND VALSARTAN 1 TABLET: 49; 51 TABLET, FILM COATED ORAL at 20:31

## 2023-04-17 RX ADMIN — ISOSORBIDE MONONITRATE 30 MG: 30 TABLET, EXTENDED RELEASE ORAL at 08:51

## 2023-04-17 RX ADMIN — SODIUM CHLORIDE, PRESERVATIVE FREE 10 ML: 5 INJECTION INTRAVENOUS at 08:56

## 2023-04-17 RX ADMIN — SACUBITRIL AND VALSARTAN 1 TABLET: 24; 26 TABLET, FILM COATED ORAL at 08:51

## 2023-04-17 RX ADMIN — CARVEDILOL 6.25 MG: 6.25 TABLET, FILM COATED ORAL at 16:18

## 2023-04-17 RX ADMIN — FUROSEMIDE 20 MG: 20 TABLET ORAL at 08:51

## 2023-04-17 RX ADMIN — CARVEDILOL 3.12 MG: 3.12 TABLET, FILM COATED ORAL at 08:51

## 2023-04-17 RX ADMIN — SODIUM CHLORIDE, PRESERVATIVE FREE 10 ML: 5 INJECTION INTRAVENOUS at 20:31

## 2023-04-17 RX ADMIN — SPIRONOLACTONE 25 MG: 25 TABLET ORAL at 08:51

## 2023-04-17 ASSESSMENT — PAIN SCALES - GENERAL
PAINLEVEL_OUTOF10: 0
PAINLEVEL_OUTOF10: 0
PAINLEVEL_OUTOF10: 2

## 2023-04-17 NOTE — CARE COORDINATION
04/17/23 1629   Service Assessment   Patient Orientation Alert and Oriented;Person;Place;Situation   Cognition Alert   History Provided By Patient   Primary [address] Family Members   Patient's Healthcare Decision Maker is: Legal Next of Leopoldo Salcedo   PCP Verified by CM Yes   Last Visit to PCP Within last 3 months   Prior Functional Level Independent in ADLs/IADLs   Current Functional Level Independent in ADLs/IADLs   Can patient return to prior living arrangement Yes   Ability to make needs known: Good   Family able to assist with home care needs: Yes   Financial Resources Other (Comment)  ([employer])   Social/Functional History   Lives With Family   Type of [address] One level   Home Access Stairs to enter with rails   Entrance Stairs - Number of Steps 3   Entrance Stairs - Rails Both   Bathroom Shower/Tub Tub/Shower unit   [employer] Grab bars in [address]   Transfer Assistance Independent   Discharge Planning   Type of [facility] Family Members   Current Services Prior To Admission None   Potential Assistance Needed N/A   DME Ordered? No   Potential Assistance Purchasing Medications No   Patient expects to be discharged to: [address] Discharge   Transition of Care Consult (CM Consult) [facility] for CHF)   [address] Discharge Nursing services   Mode of Transport at Discharge Other (see comment)  (pt's sister)   Confirm Follow Up Transport Family         Case Management Assessment  Initial Evaluation    Date/Time of Evaluation: 4/17/2023 4:37 PM  Assessment Completed by: Long Orellana RN    If patient is discharged prior to next notation, then this note serves as note for discharge by case management.     Patient Name: Cass Hdz

## 2023-04-17 NOTE — PLAN OF CARE
Problem: Cardiovascular - Adult  Goal: Maintains optimal cardiac output and hemodynamic stability  Outcome: Progressing  Goal: Absence of cardiac dysrhythmias or at baseline  Outcome: Progressing     Problem: Chronic Conditions and Co-morbidities  Goal: Patient's chronic conditions and co-morbidity symptoms are monitored and maintained or improved  Outcome: Progressing     Problem: Discharge Planning  Goal: Discharge to home or other facility with appropriate resources  Recent Flowsheet Documentation  Taken 4/17/2023 0142 by Karon Goltz, RN  Discharge to home or other facility with appropriate resources:   Identify barriers to discharge with patient and caregiver   Arrange for needed discharge resources and transportation as appropriate   Identify discharge learning needs (meds, wound care, etc)   Refer to discharge planning if patient needs post-hospital services based on physician order or complex needs related to functional status, cognitive ability or social support system

## 2023-04-17 NOTE — ED PROVIDER NOTES
EMERGENCY DEPARTMENT HISTORY AND PHYSICAL EXAM      Patient Name: Gregg Ríos  MRN: [id number]  YOB: 1960  Provider: Sania Bhardwaj MD  PCP: Jordin Verma MD   Time/Date of evaluation: 8:41 PM EDT on 23    History of Presenting Illness     No chief complaint on file. History Provided By: Patient     History Jaime Erb): Gregg Ríos is a 60 y.o. male with a PMHX of cardiomyopathy, cocaine abuse, hypertension, CVA, and CHF  who presents to the emergency department  by EMS C/O chest pain and shortness of breath. His pain is located in his right lower chest.    He denies any fevers, chills, nausea, vomiting or cough. He also states that he is not currently on oxygen at home. Past History     Past Medical History:  Past Medical History:   Diagnosis Date    Cardiomyopathy ([facility] [address]     Cocaine abuse ([facility] [address]     HTN (hypertension)     Hypertension     Stroke [facility])     Stroke risk [id number], 2009    pt stated he had a stroke in above dates       Past Surgical History:  Past Surgical History:   Procedure Laterality Date    CARDIAC PROCEDURE Right 3/10/2023    LEFT HEART CATH / CORONARY ANGIOGRAPHY performed by Sarah Lott MD at [facility] CATH LAB    CARDIAC PROCEDURE Right 3/10/2023    Left ventriculography performed by Sarah Lott MD at [facility] CATH LAB    [address]    right front of the forehead    [address] [place] [address]       Family History:  Family History   Problem Relation Age of Onset    Hypertension Father     Asthma Sister     Cancer Mother     Diabetes Father        Social History:  Social History     Tobacco Use    Smoking status: Some Days     Packs/day: 1.00     Types: Cigarettes    Smokeless tobacco: Never   Substance Use Topics    Alcohol use:  Yes     Alcohol/week: 2.0 standard drinks    Drug use: No       Medications:  Current Facility-Administered Medications   Medication Dose Route Frequency Provider Last Rate Last Admin    furosemide (LASIX)

## 2023-04-17 NOTE — H&P
16, 2023, 10:01 PM     [facility] Family Medicine  Senior Addendum to History and Physical    I have also seen and independently evaluated the patient. I agree with the plan as noted above. Additional HPI, A/P: 59 yo M with PMH HFrEF, Cardiomyopathy 2/2 cocaine use, HTN, COPD, CKDIII, h/o CVA with no residual deficits presenting for shortness of breath in last few days worsening since recent cocaine use. Patient initially requiring O2 in ED. On our evaluation, nasal cannula had fallen off and patient remained in 90s at rest. However, on ambulating, patients sats decreased 80s. Given cardiac history and change from baseline will admit to [facility] with tele for CHF exacerbation. If patient remains stable through tomorrow, would recommend 6MWT, plans for possible home O2. Consider echo and Cards consult depending on AM evaluation. He did have cath a month ago that showed no obstructive disease. Report mentions mild to moderate decrease in LVEF but I do not see estimated number. Sept 2022 ECHO showed EF 25-30%, so it seems to have improved from last year to last month. Vitals, labs and imaging reviewed     For additional problem list, assessment, and plan see intern note.     Rosalie Phillips , PGY-2   [facility]   April 16, 2023, 10:01 PM

## 2023-04-17 NOTE — CARE COORDINATION
Pt will need hospital to home [facility] home health for CHF when discharging.             JUAN LUIS BhatN RN  Care Management

## 2023-04-17 NOTE — ED NOTES
Patient ambulated w pulse ox   Oxygen dropped to 85% while ambulating   Patient is now having increased SOB using accessory muscle use placed on 2L NC     Yoselyn Molina RN  04/16/23 2041

## 2023-04-17 NOTE — CONSULTS
Definity. Left Ventricle: Severely reduced left ventricular systolic function with a visually estimated EF of 25 - 30%. Left ventricle is dilated. Mild septal thickening. Moderate posterior thickening. Septal motion is consistent with bundle branch block. Global hypokinesis present. Grade II diastolic dysfunction with increased LAP. Right Ventricle: Right ventricle size is normal. Reduced systolic function. TAPSE is abnormal. TAPSE is 1.4 cm. RV Peak S' is 7 cm/s. Aorta: Dilated aortic root. Ao Root diameter is 3.6 cm. Signed by: Shelley Ramos MD on 9/13/2022  4:34 PM, Signed by: Unknown Provider Result on 9/13/2022 12:00 AM    No results found for this or any previous visit. 03/09/23    CARDIAC PROCEDURE 03/10/2023  1:22 PM (Final)    Conclusion  Left dominant coronary circulation  Left main normal  LAD: Minimal luminal irregularity  LCx: Large and dominant, minimal luminal regularities, OM: Large and dominant, LPDA normal  RCA: Mild diffuse luminal irregularities, small nondominant vessel  LVEDP 4-6 mmHg  LVEF mild is moderately reduced    Signed by: Shelley Ramos MD on 3/10/2023  1:22 PM    Xray Result (most recent):  XR CHEST LIMITED ONE VIEW 04/16/2023    Narrative  ONE VIEW CHEST RADIOGRAPH    INDICATION: CP.  Shortness of breath and chest pain since 7:30 AM.    COMPARISON: Chest radiograph 3/9/2023. TECHNIQUE: AP view of the chest    FINDINGS:    LINES/TUBES: None. MEDIASTINUM: Mild cardiomegaly, stable to slightly increased compared to prior. LUNGS: Pulmonary vascular congestion. No consolidation, pleural effusion, or  pneumothorax. BONES/OTHER: No acute osseous abnormality. Impression  Pulmonary vascular congestion. Cardiomegaly is similar to slightly increased compared to prior.       Signed By: Nereyda Harper PA-C     April 17, 2023

## 2023-04-17 NOTE — ED NOTES
[facility] requesting a new EKG   - obtained at 2237  EKG transmitted into file     [facility]  04/16/23 7469

## 2023-04-17 NOTE — ED NOTES
TRANSFER - OUT REPORT:    Verbal report given to Fredy moore RN on [facility] Meghan  being transferred to CVT for routine progression of patient care       Report consisted of patient's Situation, Background, Assessment and   Recommendations(SBAR). Information from the following report(s) ED Encounter Summary, ED SBAR, Intake/Output, and MAR was reviewed with the receiving nurse. [place] Assessment: No data recorded  Lines:   Peripheral IV 04/16/23 Left; Anterior Forearm (Active)   Site Assessment Clean, dry & intact 04/16/23 1538       Peripheral IV 04/16/23 Left;Proximal;Anterior Forearm (Active)   Site Assessment Clean, dry & intact 04/16/23 1525   Line Status Blood return noted;Specimen collected 04/16/23 1525   Phlebitis Assessment No symptoms 04/16/23 1525   Infiltration Assessment 0 04/16/23 1525        Opportunity for questions and clarification was provided.       Patient transported with:  Registered Nurse           Dilia Horvath RN  04/16/23 [phone] [id number]

## 2023-04-17 NOTE — FLOWSHEET NOTE
2345 Patient arrived from ER via stretcher. Denies any CP or SOB. SOB on exertion.     [id number]   04/17/23 0018   Treatment Team Notification   Reason for Communication Abnormal vitals  (Elevated BPs and frequent PVCs)   Team Member Name Nirali WANG)   Treatment Team Role Attending Provider   Method of Communication Page   Response See orders   Notification Time 0012

## 2023-04-18 ENCOUNTER — HOME HEALTH ADMISSION (OUTPATIENT)
Age: 63
End: 2023-04-18

## 2023-04-18 VITALS
RESPIRATION RATE: 16 BRPM | HEART RATE: 59 BPM | OXYGEN SATURATION: 97 % | SYSTOLIC BLOOD PRESSURE: 134 MMHG | DIASTOLIC BLOOD PRESSURE: 90 MMHG | BODY MASS INDEX: 23.43 KG/M2 | TEMPERATURE: 97.7 F | HEIGHT: 67 IN | WEIGHT: 149.25 LBS

## 2023-04-18 LAB
ALBUMIN SERPL-MCNC: 3 G/DL (ref 3.4–5)
ALBUMIN/GLOB SERPL: 0.8 (ref 0.8–1.7)
ALP SERPL-CCNC: 68 U/L (ref 45–117)
ALT SERPL-CCNC: 138 U/L (ref 16–61)
ANION GAP SERPL CALC-SCNC: 5 MMOL/L (ref 3–18)
AST SERPL-CCNC: 55 U/L (ref 10–38)
BASOPHILS # BLD: 0 K/UL (ref 0–0.1)
BASOPHILS NFR BLD: 0 % (ref 0–2)
BILIRUB SERPL-MCNC: 1 MG/DL (ref 0.2–1)
BUN SERPL-MCNC: 24 MG/DL (ref 7–18)
BUN/CREAT SERPL: 15 (ref 12–20)
CALCIUM SERPL-MCNC: 9.2 MG/DL (ref 8.5–10.1)
CHLORIDE SERPL-SCNC: 106 MMOL/L (ref 100–111)
CO2 SERPL-SCNC: 26 MMOL/L (ref 21–32)
CREAT SERPL-MCNC: 1.59 MG/DL (ref 0.6–1.3)
DIFFERENTIAL METHOD BLD: ABNORMAL
EKG ATRIAL RATE: 79 BPM
EKG ATRIAL RATE: 86 BPM
EKG DIAGNOSIS: NORMAL
EKG DIAGNOSIS: NORMAL
EKG P AXIS: 60 DEGREES
EKG P AXIS: 67 DEGREES
EKG P-R INTERVAL: 170 MS
EKG P-R INTERVAL: 176 MS
EKG Q-T INTERVAL: 402 MS
EKG Q-T INTERVAL: 418 MS
EKG QRS DURATION: 90 MS
EKG QRS DURATION: 92 MS
EKG QTC CALCULATION (BAZETT): 479 MS
EKG QTC CALCULATION (BAZETT): 481 MS
EKG R AXIS: -8 DEGREES
EKG R AXIS: 11 DEGREES
EKG T AXIS: 108 DEGREES
EKG T AXIS: 143 DEGREES
EKG VENTRICULAR RATE: 79 BPM
EKG VENTRICULAR RATE: 86 BPM
EOSINOPHIL # BLD: 0 K/UL (ref 0–0.4)
EOSINOPHIL NFR BLD: 0 % (ref 0–5)
ERYTHROCYTE [DISTWIDTH] IN BLOOD BY AUTOMATED COUNT: 14.1 % (ref 11.6–14.5)
GLOBULIN SER CALC-MCNC: 3.9 G/DL (ref 2–4)
GLUCOSE SERPL-MCNC: 94 MG/DL (ref 74–99)
HCT VFR BLD AUTO: 32.5 % (ref 36–48)
HGB BLD-MCNC: 10.9 G/DL (ref 13–16)
IMM GRANULOCYTES # BLD AUTO: 0 K/UL (ref 0–0.04)
IMM GRANULOCYTES NFR BLD AUTO: 0 % (ref 0–0.5)
LYMPHOCYTES # BLD: 1.8 K/UL (ref 0.9–3.6)
LYMPHOCYTES NFR BLD: 34 % (ref 21–52)
MAGNESIUM SERPL-MCNC: 1.8 MG/DL (ref 1.6–2.6)
MCH RBC QN AUTO: 28.5 PG (ref 24–34)
MCHC RBC AUTO-ENTMCNC: 33.5 G/DL (ref 31–37)
MCV RBC AUTO: 84.9 FL (ref 78–100)
MONOCYTES # BLD: 0.7 K/UL (ref 0.05–1.2)
MONOCYTES NFR BLD: 14 % (ref 3–10)
NEUTS SEG # BLD: 2.8 K/UL (ref 1.8–8)
NEUTS SEG NFR BLD: 52 % (ref 40–73)
NRBC # BLD: 0 K/UL (ref 0–0.01)
NRBC BLD-RTO: 0 PER 100 WBC
PLATELET # BLD AUTO: 252 K/UL (ref 135–420)
PMV BLD AUTO: 10.6 FL (ref 9.2–11.8)
POTASSIUM SERPL-SCNC: 3.7 MMOL/L (ref 3.5–5.5)
PROT SERPL-MCNC: 6.9 G/DL (ref 6.4–8.2)
RBC # BLD AUTO: 3.83 M/UL (ref 4.35–5.65)
SODIUM SERPL-SCNC: 137 MMOL/L (ref 136–145)
WBC # BLD AUTO: 5.4 K/UL (ref 4.6–13.2)

## 2023-04-18 PROCEDURE — 99222 1ST HOSP IP/OBS MODERATE 55: CPT | Performed by: INTERNAL MEDICINE

## 2023-04-18 PROCEDURE — 80053 COMPREHEN METABOLIC PANEL: CPT

## 2023-04-18 PROCEDURE — 94761 N-INVAS EAR/PLS OXIMETRY MLT: CPT

## 2023-04-18 PROCEDURE — 93010 ELECTROCARDIOGRAM REPORT: CPT | Performed by: INTERNAL MEDICINE

## 2023-04-18 PROCEDURE — 36415 COLL VENOUS BLD VENIPUNCTURE: CPT

## 2023-04-18 PROCEDURE — 6370000000 HC RX 637 (ALT 250 FOR IP): Performed by: PHYSICIAN ASSISTANT

## 2023-04-18 PROCEDURE — 6370000000 HC RX 637 (ALT 250 FOR IP)

## 2023-04-18 PROCEDURE — 83735 ASSAY OF MAGNESIUM: CPT

## 2023-04-18 PROCEDURE — 2700000000 HC OXYGEN THERAPY PER DAY

## 2023-04-18 PROCEDURE — 2580000003 HC RX 258

## 2023-04-18 PROCEDURE — 85025 COMPLETE CBC W/AUTO DIFF WBC: CPT

## 2023-04-18 RX ORDER — LANOLIN ALCOHOL/MO/W.PET/CERES
325 CREAM (GRAM) TOPICAL 2 TIMES DAILY
Qty: 90 TABLET | Refills: 3 | Status: SHIPPED | OUTPATIENT
Start: 2023-04-18

## 2023-04-18 RX ORDER — FUROSEMIDE 40 MG/1
40 TABLET ORAL DAILY
Qty: 60 TABLET | Refills: 3 | Status: SHIPPED | OUTPATIENT
Start: 2023-04-19

## 2023-04-18 RX ORDER — CARVEDILOL 6.25 MG/1
6.25 TABLET ORAL 2 TIMES DAILY WITH MEALS
Qty: 60 TABLET | Refills: 3 | Status: SHIPPED | OUTPATIENT
Start: 2023-04-18

## 2023-04-18 RX ADMIN — FUROSEMIDE 40 MG: 40 TABLET ORAL at 09:04

## 2023-04-18 RX ADMIN — CARVEDILOL 6.25 MG: 6.25 TABLET, FILM COATED ORAL at 09:04

## 2023-04-18 RX ADMIN — EMPAGLIFLOZIN 10 MG: 10 TABLET, FILM COATED ORAL at 09:04

## 2023-04-18 RX ADMIN — ISOSORBIDE MONONITRATE 30 MG: 30 TABLET, EXTENDED RELEASE ORAL at 09:03

## 2023-04-18 RX ADMIN — SACUBITRIL AND VALSARTAN 1 TABLET: 49; 51 TABLET, FILM COATED ORAL at 09:07

## 2023-04-18 RX ADMIN — SPIRONOLACTONE 25 MG: 25 TABLET ORAL at 09:04

## 2023-04-18 RX ADMIN — ASPIRIN 81 MG: 81 TABLET, COATED ORAL at 09:03

## 2023-04-18 RX ADMIN — SODIUM CHLORIDE, PRESERVATIVE FREE 10 ML: 5 INJECTION INTRAVENOUS at 09:04

## 2023-04-18 ASSESSMENT — PAIN SCALES - WONG BAKER: WONGBAKER_NUMERICALRESPONSE: 0

## 2023-04-18 ASSESSMENT — PAIN SCALES - GENERAL: PAINLEVEL_OUTOF10: 0

## 2023-04-18 NOTE — PLAN OF CARE
Problem: Discharge Planning  Goal: Discharge to home or other facility with appropriate resources  4/18/2023 1014 by Ivory Lambert RN  Outcome: Progressing  4/18/2023 0149 by Jodell Boas, RN  Outcome: Progressing

## 2023-04-18 NOTE — DISCHARGE SUMMARY
on room air. BP elevated   -Labs: CBC, CMP, UDS (cocaine and opiates), BNP, mag, troponin   -Imaging: CXR  -EKG: Sinus rhythm with frequent PVCs, LVH, ST and T wave abnormalities, prolonged QT per ED MD read   -Meds: IV lasix, nitro paste, morphine x2  -IVF: none  -Procedures: none  -Consults: none    Mr. Jono Martinez was given respiratory support with a maximum of 3L O2 by nasal cannula and had his blood pressure medications optimized with assistance from Cardiology. Further work up of HF was not needed and  he was discharged on Hospital day 2 after he showed good O2 saturations while off Nasal cannula. Patient's problem list was managed in hospital as stated below:     Acute on Chronic HFrEF, Combined Systolic & Diastolic Dysfunction   Cardiomyopathy  HTN  -Patient presented with worsening SOB starting this morning 4/16. Denies left sided chest pain or palpitations. -pro-BNP and Tropnin were both elevated but decreased compared to recent previous admissions. -CXR 4/16: Pulmonary vascular congestion. Cardiomegaly is similar to slightly increased compared to prior.  -Cardiac cath done 3/10 was largely unremarkable, mentions mild - moderately decreased LVEF  -last echo (2/22/2023 DR DESAI [facility]) with LVEF 25% with severe global hypokinesis, moderate LVH, grade II diastolic dysfunction, no hemodynamically significant valvular disease  - HTN eventually controlled with adjusted home regimen: Carvedilol 6.25, Jardiance 10, Lasix 40, Entresto 49-51, Spironolactone 25, Imdur 30  - Atorvastatin was held d/t transaminitis but then restarted with improved labs     COPD  Tobacco Use  -Has been smoking on and off for 30 years. CKD IIIa  -Cr at admission 1.54 with eGFR 50.  Appears to be at baseline.   -Last admission, Cr was 1.69 and GFR was 45.  - Cr: 1.54 > 1.41 > 1.59       H/o CVA  -Per patient has had two strokes, first in 1980's and second in 2000's  -No residual deficits   -Home meds: ASA 81mg, atorvastatin, continued

## 2023-04-18 NOTE — DISCHARGE INSTR - DIET
Good nutrition is important when healing from an illness, injury, or surgery. Follow any nutrition recommendations given to you during your hospital stay. If you were given an oral nutrition supplement while in the hospital, continue to take this supplement at home. You can take it with meals, in-between meals, and/or before bedtime. These supplements can be purchased at most local grocery stores, pharmacies, and chain [employer]stores. If you have any questions about your diet or nutrition, call the hospital and ask for the dietitian.       You may have a regular diet

## 2023-04-18 NOTE — CARE COORDINATION
Discharge order noted for today. Pt has been accepted to [facility] agency. Met with patient  and are agreeable to the transition plan today. Transport has been arranged through his sister. Patient's discharge summary and home health  orders have been forwarded to [facility] home health  agency . Updated bedside RN, Roselia Cummins,  to the transition plan.   Discharge information has been documented on the AVS.       MARCIA Odonnell RN  Care Management

## 2023-04-19 LAB
HCV GENTYP SERPL NAA+PROBE: NORMAL
HCV GENTYP SERPL NAA+PROBE: NORMAL
HCV RNA SERPL NAA+PROBE-ACNC: NORMAL IU/ML
HCV RNA SERPL NAA+PROBE-LOG IU: 5.21 LOG10 IU/ML
LABORATORY COMMENT REPORT: NORMAL
LABORATORY COMMENT REPORT: NORMAL

## 2023-04-24 LAB — HFE GENE MUT ANL BLD/T: NORMAL

## 2023-04-24 NOTE — PROGRESS NOTES
attempted to complete the initial Spiritual Assessment of the patient in room 2305 this morning but found the patient resting peacefully at present. Patient is here due to CHF. There is no advance directive on file. . Patient does not have any Shinto/cultural needs that will affect patients preferences in health care. Chaplains will continue to follow and will provide pastoral care on an as needed/requested basis.     [facility]  Spiritual Care Department  [phone]
5634- pt complaining of discomfort and sob. BP elevated 180s/100s. O2 sats 92% on Room air. Pt noticeably sob. O2 back on 3L. Will reassess and try to wean O2.
Bedside and Verbal shift change report given to Roberto Canas RN (oncoming nurse) by La Galvan RN (offgoing nurse). Report included the following information Nurse Handoff Report, Intake/Output, MAR, Recent Results, Med Rec Status, and Cardiac Rhythm NSR .      1923: AOX4, VSS, denies pain or sob, on 2L O2 nc, non labored breathing, shift assessment completed, bed locked and low position, call bell within reach    2031: Due medication given, no c/o voiced    2307: Pt awake, denies any discomfort, O2 in place, V/S done, no change from previous assessment    0133: Pt sleeping, no distress noted, urinal within reach    0340: VSS, pt sleeping, no visual sign of distress, no change from previous assessment    0602: Pt sleeping, no discomfort noted, needs within reach    Bedside and Verbal shift change report given to La Galvan RN (oncoming nurse) by Roberto Canas RN (offgoing nurse). Report included the following information Nurse Handoff Report, Intake/Output, MAR, Recent Results, Med Rec Status, and Cardiac Rhythm NSR .
[facility]  POST-ROUNDING NOTE    6 minute Walk Test    Assessment & Plan:    Please measure O2 sat while seated in bed with the O2NC; 2 minutes later, please measure O2 sat while seated in bed without the O2NC. If O2 sat in room air was >92%, then please walk the patient without oxygen and measure O2 sat. If patient's O2 sat while walking is <92%, please apply 2LNC and perform walking test to see if patient can keep O2 sat >92%. Please document these values in the chart either in the flowsheets or as a separate note. Thank you for the help. The above patient and plan were discussed with my supervising physician. See daily progress note for full assessment/plan.       Olive Erwin MD, PGY-1  [facility]  4/17/2023, 1:21 PM
PHYSICAL THERAPY EVALUATION/DISCHARGE    Patient: Aldo Richards (1 y.o. male)  Date: 4/17/2023  Primary Diagnosis: Acute systolic CHF (congestive heart failure) ([facility] [I50.21]  Acute decompensated heart failure ([address] [I50.9]  Precautions: Fall Risk  PLOF: Independent  ASSESSMENT AND RECOMMENDATIONS:  Supine in bed. Mod I for supine to sit. Mod I for sit to stand. Amb 25ft with steady gait. Completed standing marches with good balance to simulate safety from stairs. Returned to seated in bed. Denies mobility concerns with discharge to home. Call bell in reach. Patient does not require further skilled physical therapy intervention at this level of care. Further Equipment Recommendations for Discharge: None    [facility] Basic Mobility Inpatient Short Form:  24/24   This [facility] score should be considered in conjunction with interdisciplinary team recommendations to determine the most appropriate discharge setting. Patient's social support, diagnosis, medical stability, and prior level of function should also be taken into consideration. At this time and based on an AM-PAC score of 24/24, no further PT is recommended upon discharge. SUBJECTIVE:   Patient stated The oxygen helps.     OBJECTIVE DATA SUMMARY:     Past Medical History:   Diagnosis Date    Cardiomyopathy ([facility]     Cocaine abuse ([facility]     HTN (hypertension)     Hypertension     Stroke [facility])     Stroke risk 3289,5343, 2009    pt stated he had a stroke in above dates     Past Surgical History:   Procedure Laterality Date    CARDIAC PROCEDURE Right 3/10/2023    LEFT HEART CATH / CORONARY ANGIOGRAPHY performed by Wayne Clark MD at [facility] CATH LAB    CARDIAC PROCEDURE Right 3/10/2023    Left ventriculography performed by Wayne Clark MD at [address] [place] [address]    right front of the [address] [place] [address] Situation:  Social/Functional History  Lives With: Family  Type of Home: House  Home Layout: One
Performed 6 minute walk. Pt 02 sats remained 95-96% for majority of the walk. However, patient did have some sob about 4 minutes into the walk his O2 sats briefly dropped to 90% but then he quickly recovered back up to 95%.
Physician Progress Note      PATIENT:               Manoj Lopes  [facility] #:                  [id number]  :                       1960  ADMIT DATE:       2023 3:26 PM  Viridiana [facility] [place] DATE:        2023 2:43 PM  RESPONDING  PROVIDER #:        Seferino Oliva MD          QUERY TEXT:    60Yrs old male presents with SOB and Chest pain admitted for Acute systolic   CHF. Per H&P  documented as Cocaine Abuse and admits to cocaine use 3-4 days ago. Reports   symptoms may have  began at that time but worsened this AM, counseled on need to stop using   cocaine in setting of  CHF with EF 25-30% If possible, please document in progress notes and   discharge summary the relationship, if any, between CHF and cocaine abuse. The medical record reflects the following:  Risk Factors: 60Yrs old male presents with SOB and Chest pain admitted for   Acute systolic CHF as per H&P  documented as Cocaine Abuse Admits to cocaine use 3-4 days ago  Clinical Indicators: Per H&P - Patient reports that after using cocaine 3-4   days ago, he started to feel mildly unwell with SOB which worsened this AM   . Treatment: Counseled on need to stop using cocaine in setting of  CHF with EF 25-30%    Thank you,  Janice Shultz RN, [email or web address]  >  Options provided:  -- CHF exacerbation due to cocaine abuse  -- CHF exacerbation unrelated to cocaine abuse  -- Other - I will add my own diagnosis  -- Disagree - Not applicable / Not valid  -- Disagree - Clinically unable to determine / Unknown  -- Refer to Clinical Documentation Reviewer    PROVIDER RESPONSE TEXT:    This patient has CHF exacerbation due to cocaine abuse.     Query created by: Silvano Gutierrez on 2023 10:53 AM      Electronically signed by:  Seferino Oliva MD 2023 11:29 AM
Received discharge prescriptions for patient at outpatient pharmacy. Patient has no copay.
TRANSFER - IN REPORT:    Verbal report received from Jemma Colvin RN on Anyi Ely  being received from ED for routine progression of patient care      Report consisted of patient's Situation, Background, Assessment and   Recommendations(SBAR). Information from the following report(s) Nurse Handoff Report, ED SBAR, Adult Overview, Intake/Output, MAR, Recent Results, Med Rec Status, and Cardiac Rhythm NSR  was reviewed with the receiving nurse. Opportunity for questions and clarification was provided. Assessment completed upon patient's arrival to unit and care assumed.
2/22/2023 at [facility] w/ LVEF 25% w/ severe global hypokinesis, moderate LVH, grade II diastolic dysfunction, no HD significant valvular disease  -[facility] on 3/10/23 without significant CAD  -received IV lasix 20 mg on admission 4/16 and yesterday afternoon 4/17  -received IV hydralazine for BP of 190/137 yesterday at 1 am 4/17  Plan:  -transition to lasix PO, scheduled for 40 mg at 9 am this morning  -does not need home oxygen  -GDMT Coreg increased to 6.25 mg BID, Entresto increased to 49-51 mg BID, Jardiance 10, Imdur 30, Aldactone 25, lasix 40 daily per cardiology  -IV hydralazine 10 mg for BP > 180/120  -ASA 81 mg, atorvastatin 40 held due to transaminitis, Lovenox for DVT prophylaxis  -fluid restriction to 2L/day, salt intake <2g/day, low salt diet, strict I&Os, daily weights  -trend troponin  -daily CBC, CMP, Mg  -on telemetry?     COPD  Tobacco use  -smoking on and off for 30 years, does not smoke daily currently  -no home inhalers or medications, no problems with breathing prior  -on 3 L NC at admission, now on 2 L NC at saturating 98-99%  -passed 6 min walk test yesterday afternoon 4/17  Plan:  -Wean O2 as tolerated, plan for RA this afternoon or tomorrow  -duoneb PRN (has not needed it yet)  - on smoking cessation, nicotine replacement as needed    CKDIIIa  -Cr 1.54 at admission, 1.59 today 4/18 around his baseline  -historically eGFR between 45-56, today is 48  Plan:  -daily CMP  -avoid nephrotoxic agents  -continue Jardiance and Entresto for renal protection    CVA x2  -per patient, first stroke in 1980s, second in 2000s without residual deficits  -on ASA 81 mg and atorvastatin at home  Plan:  -continue ASA 81 mg  -hold atorvastatin given transaminitis, continue home dose on discharge    Polysubstance use  -reports using cocaine 3-4 days prior to admission  -reports using marijuana on occasion  -denies IV drug use  -UDS + for cocaine and opioids  Plan:  -counseled on stopping cocaine use in the setting
CARDIAC PROCEDURE Right 3/10/2023    Left ventriculography performed by Wayne Clark MD at [address]    right front of the [address] Situation:   Social/Functional History  Lives With: Family  Type of Home: House  Home Layout: One level  Home Access: Stairs to enter with rails  Entrance Stairs - Number of Steps: 3  [x]  Right hand dominant   []  Left hand dominant    Cognitive/Behavioral Status:  Orientation  Orientation Level: Oriented X4    Skin: Intact  Edema: None noted    Balance:  Balance  Sitting: Intact  Standing: Intact    Strength: BUE  Strength: Within functional limits    Tone & Sensation: BUE  Tone: Normal  Sensation: Intact    Range of Motion: BUE  AROM: Within functional limits  Functional Mobility and Transfers for ADLs:  Bed Mobility:  Bed Mobility Training  Bed Mobility Training: Yes  Supine to Sit: Modified independent  Sit to Supine: Modified independent    Transfers:  Transfer Training  Transfer Training: Yes  Sit to Stand: Modified independent  Stand to Sit: Modified independent    ADL Assessment:   Feeding: Independent  Grooming: Independent  UE Bathing: Independent  LE Bathing: Independent  UE Dressing: Independent  LE Dressing: Independent  Toileting: Independent    Pain:  Pain level pre-treatment: 0/10   Pain level post-treatment: 0/10   Pain Intervention(s): Rest, Ice, Repositioning   Response to intervention: Nurse notified    Activity Tolerance:   Activity Tolerance: Patient Tolerated treatment well  Please refer to the flowsheet for vital signs taken during this treatment.     After treatment:   [] Patient left in no apparent distress sitting up in chair  [x] Patient left in no apparent distress in bed  [x] Call bell left within reach  [x] Nursing notified  [] Caregiver present  [x] Bed alarm activated    COMMUNICATION/EDUCATION:   Patient Education  Education Given To: Patient  Education Provided: Role of Therapy;Plan
reflects the following:  Risk Factors:  60 y.o. male with PMHx of HFrEF, cardiomyopathy, HTN, COPD,   tobacco use, cocaine use, CKD, h/o CVA x2 who presented to [facility] ED on 4/16/2023   with complaint of SOB and now admitted for CHF exacerbation. Clinical Indicators: Per 4/17 Card - Acute on chronic HFrEF/NICMY; elevated   troponin at 202, likely demand in setting of HFrEF/HTN; improved from previous   admission; NSTEMI II v I  Per 4/18 Card - NSTEMI II; recent left heart catheterization March 2023   without significant epicardial coronary disease  Treatment: Aspirin 81 mg p.o. daily, Lasix 40 mg daily    Thank you,  Abi Alberts RN, [email or web address]  >  Options provided:  -- Demand Ischemia only, no MI  -- Type 2 MI  -- Other - I will add my own diagnosis  -- Disagree - Not applicable / Not valid  -- Disagree - Clinically unable to determine / Unknown  -- Refer to Clinical Documentation Reviewer    PROVIDER RESPONSE TEXT:    This patient has demand ischemia only, no MI.     Query created by: Ronel Garzon on 4/20/2023 7:27 AM      Electronically signed by:  Pastora Persaud MD 4/20/2023 12:15 PM
Component Value Date/Time    TROPHS 195 04/17/2023 04:37 PM    TROPHS 202 04/16/2023 03:25 PM    TROPHS 351 03/09/2023 06:25 AM      BNP Lab Results   Component Value Date/Time    NTPROBNP 26,026 04/16/2023 03:25 PM      Liver Enzymes Lab Results   Component Value Date     (H) 04/18/2023    AST 55 (H) 04/18/2023    ALKPHOS 68 04/18/2023    BILITOT 1.0 04/18/2023        Signed By: Talia Hobson PA-C     April 18, 2023
Last Rate Last Admin    [START ON 4/18/2023] furosemide (LASIX) tablet 40 mg  40 mg Oral Daily Caroline Oliva MD        sacubitril-valsartan (ENTRESTO) 49-51 MG per tablet 1 tablet  1 tablet Oral BID Adry Lang PA-C        carvedilol (COREG) tablet 6.25 mg  6.25 mg Oral BID  Adry Lang PA-C        sodium chloride flush 0.9 % injection 5-40 mL  5-40 mL IntraVENous 2 times per day Veneda Hammans, DO   10 mL at 04/17/23 0856    sodium chloride flush 0.9 % injection 5-40 mL  5-40 mL IntraVENous PRN Jefry Newton, DO        0.9 % sodium chloride infusion   IntraVENous PRN Veneda Hammans, DO        enoxaparin (LOVENOX) injection 40 mg  40 mg SubCUTAneous QPM Veneda Hammans, DO   40 mg at 04/16/23 2056    polyethylene glycol (GLYCOLAX) packet 17 g  17 g Oral Daily PRN Veneda Hammans, DO        acetaminophen (TYLENOL) tablet 650 mg  650 mg Oral Q6H PRN Veneda Hammans, DO        Or    acetaminophen (TYLENOL) suppository 650 mg  650 mg Rectal Q6H PRN Veneda Hammans, DO        potassium chloride (KLOR-CON M) extended release tablet 40 mEq  40 mEq Oral PRN Veneda Hammans, DO        Or    potassium bicarb-citric acid (EFFER-K) effervescent tablet 40 mEq  40 mEq Oral PRN Veneda Hammans, DO        Or    potassium chloride 10 mEq/100 mL IVPB (Peripheral Line)  10 mEq IntraVENous PRN Veneda Hammans, DO        magnesium sulfate 2000 mg in 50 mL IVPB premix  2,000 mg IntraVENous PRN Veneda Hammans, DO        aspirin EC tablet 81 mg  81 mg Oral Daily Veneda Hammans, DO   81 mg at 04/17/23 0851    [Held by provider] atorvastatin (LIPITOR) tablet 40 mg  40 mg Oral Nightly Scotty Newton, DO        empagliflozin (JARDIANCE) tablet 10 mg  10 mg Oral Daily Veneda Hammans, DO   10 mg at 04/17/23 0851    isosorbide mononitrate (IMDUR) extended release tablet 30 mg  30 mg Oral Daily Veneda Hammans, DO   30 mg at 04/17/23 4524    spironolactone (ALDACTONE) tablet 25 mg  25 mg Oral Daily Veneda Hammans, DO   25 mg at 04/17/23

## 2023-04-27 ENCOUNTER — HOME CARE VISIT (OUTPATIENT)
Age: 63
End: 2023-04-27

## 2023-05-01 ENCOUNTER — APPOINTMENT (OUTPATIENT)
Facility: HOSPITAL | Age: 63
DRG: 194 | End: 2023-05-01
Payer: COMMERCIAL

## 2023-05-01 ENCOUNTER — HOSPITAL ENCOUNTER (INPATIENT)
Facility: HOSPITAL | Age: 63
LOS: 2 days | Discharge: HOME | DRG: 194 | End: 2023-05-04
Attending: EMERGENCY MEDICINE | Admitting: FAMILY MEDICINE
Payer: COMMERCIAL

## 2023-05-01 DIAGNOSIS — I10 HYPERTENSION, UNSPECIFIED TYPE: ICD-10-CM

## 2023-05-01 DIAGNOSIS — I50.23 ACUTE ON CHRONIC SYSTOLIC CONGESTIVE HEART FAILURE (HCC): Primary | ICD-10-CM

## 2023-05-01 LAB
ALBUMIN SERPL-MCNC: 3.6 G/DL (ref 3.4–5)
ALBUMIN/GLOB SERPL: 0.9 (ref 0.8–1.7)
ALP SERPL-CCNC: 87 U/L (ref 45–117)
ALT SERPL-CCNC: 80 U/L (ref 16–61)
ANION GAP SERPL CALC-SCNC: 7 MMOL/L (ref 3–18)
AST SERPL-CCNC: 91 U/L (ref 10–38)
BASOPHILS # BLD: 0 K/UL (ref 0–0.1)
BASOPHILS NFR BLD: 0 % (ref 0–2)
BILIRUB SERPL-MCNC: 0.8 MG/DL (ref 0.2–1)
BUN SERPL-MCNC: 24 MG/DL (ref 7–18)
BUN/CREAT SERPL: 12 (ref 12–20)
CALCIUM SERPL-MCNC: 8.9 MG/DL (ref 8.5–10.1)
CHLORIDE SERPL-SCNC: 109 MMOL/L (ref 100–111)
CO2 SERPL-SCNC: 22 MMOL/L (ref 21–32)
CREAT SERPL-MCNC: 1.95 MG/DL (ref 0.6–1.3)
DIFFERENTIAL METHOD BLD: ABNORMAL
EOSINOPHIL # BLD: 0 K/UL (ref 0–0.4)
EOSINOPHIL NFR BLD: 0 % (ref 0–5)
ERYTHROCYTE [DISTWIDTH] IN BLOOD BY AUTOMATED COUNT: 13.9 % (ref 11.6–14.5)
GLOBULIN SER CALC-MCNC: 4 G/DL (ref 2–4)
GLUCOSE SERPL-MCNC: 155 MG/DL (ref 74–99)
HCT VFR BLD AUTO: 37.4 % (ref 36–48)
HGB BLD-MCNC: 12.7 G/DL (ref 13–16)
IMM GRANULOCYTES # BLD AUTO: 0 K/UL (ref 0–0.04)
IMM GRANULOCYTES NFR BLD AUTO: 0 % (ref 0–0.5)
LYMPHOCYTES # BLD: 1.9 K/UL (ref 0.9–3.6)
LYMPHOCYTES NFR BLD: 31 % (ref 21–52)
MCH RBC QN AUTO: 28.6 PG (ref 24–34)
MCHC RBC AUTO-ENTMCNC: 34 G/DL (ref 31–37)
MCV RBC AUTO: 84.2 FL (ref 78–100)
MONOCYTES # BLD: 0.8 K/UL (ref 0.05–1.2)
MONOCYTES NFR BLD: 14 % (ref 3–10)
NEUTS SEG # BLD: 3.3 K/UL (ref 1.8–8)
NEUTS SEG NFR BLD: 55 % (ref 40–73)
NRBC # BLD: 0 K/UL (ref 0–0.01)
NRBC BLD-RTO: 0 PER 100 WBC
NT PRO BNP: ABNORMAL PG/ML (ref 0–900)
PLATELET # BLD AUTO: 221 K/UL (ref 135–420)
PMV BLD AUTO: 10.7 FL (ref 9.2–11.8)
POTASSIUM SERPL-SCNC: 4 MMOL/L (ref 3.5–5.5)
PROT SERPL-MCNC: 7.6 G/DL (ref 6.4–8.2)
RBC # BLD AUTO: 4.44 M/UL (ref 4.35–5.65)
SODIUM SERPL-SCNC: 138 MMOL/L (ref 136–145)
WBC # BLD AUTO: 6 K/UL (ref 4.6–13.2)

## 2023-05-01 PROCEDURE — 85025 COMPLETE CBC W/AUTO DIFF WBC: CPT

## 2023-05-01 PROCEDURE — 83880 ASSAY OF NATRIURETIC PEPTIDE: CPT

## 2023-05-01 PROCEDURE — 71045 X-RAY EXAM CHEST 1 VIEW: CPT

## 2023-05-01 PROCEDURE — 80053 COMPREHEN METABOLIC PANEL: CPT

## 2023-05-01 PROCEDURE — 84484 ASSAY OF TROPONIN QUANT: CPT

## 2023-05-01 PROCEDURE — 99285 EMERGENCY DEPT VISIT HI MDM: CPT

## 2023-05-01 PROCEDURE — 93005 ELECTROCARDIOGRAM TRACING: CPT | Performed by: EMERGENCY MEDICINE

## 2023-05-02 ENCOUNTER — APPOINTMENT (OUTPATIENT)
Facility: HOSPITAL | Age: 63
DRG: 194 | End: 2023-05-02
Payer: COMMERCIAL

## 2023-05-02 PROBLEM — I50.33 CHF (CONGESTIVE HEART FAILURE), NYHA CLASS II, ACUTE ON CHRONIC, DIASTOLIC (HCC): Status: RESOLVED | Noted: 2023-05-02 | Resolved: 2023-05-02

## 2023-05-02 PROBLEM — I50.9 ACUTE DECOMPENSATED HEART FAILURE (HCC): Status: RESOLVED | Noted: 2023-05-02 | Resolved: 2023-05-02

## 2023-05-02 PROBLEM — I13.10 CARDIORENAL SYNDROME: Status: ACTIVE | Noted: 2023-05-02

## 2023-05-02 PROBLEM — K76.1 HEPATIC CONGESTION: Status: ACTIVE | Noted: 2023-05-02

## 2023-05-02 PROBLEM — I50.33 CHF (CONGESTIVE HEART FAILURE), NYHA CLASS II, ACUTE ON CHRONIC, DIASTOLIC (HCC): Status: ACTIVE | Noted: 2023-05-02

## 2023-05-02 PROBLEM — F19.90 POLYSUBSTANCE USE DISORDER: Status: ACTIVE | Noted: 2023-05-02

## 2023-05-02 PROBLEM — I50.9 CHF EXACERBATION (HCC): Status: RESOLVED | Noted: 2022-09-13 | Resolved: 2023-05-02

## 2023-05-02 PROBLEM — I50.9 ACUTE DECOMPENSATED HEART FAILURE (HCC): Status: ACTIVE | Noted: 2023-05-02

## 2023-05-02 LAB
ALBUMIN SERPL-MCNC: 3 G/DL (ref 3.4–5)
ALBUMIN/GLOB SERPL: 0.8 (ref 0.8–1.7)
ALP SERPL-CCNC: 74 U/L (ref 45–117)
ALT SERPL-CCNC: 66 U/L (ref 16–61)
ANION GAP SERPL CALC-SCNC: 6 MMOL/L (ref 3–18)
AST SERPL-CCNC: 58 U/L (ref 10–38)
BILIRUB SERPL-MCNC: 0.7 MG/DL (ref 0.2–1)
BUN SERPL-MCNC: 28 MG/DL (ref 7–18)
BUN/CREAT SERPL: 16 (ref 12–20)
CALCIUM SERPL-MCNC: 8.7 MG/DL (ref 8.5–10.1)
CHLORIDE SERPL-SCNC: 108 MMOL/L (ref 100–111)
CO2 SERPL-SCNC: 25 MMOL/L (ref 21–32)
CREAT SERPL-MCNC: 1.72 MG/DL (ref 0.6–1.3)
ERYTHROCYTE [DISTWIDTH] IN BLOOD BY AUTOMATED COUNT: 14 % (ref 11.6–14.5)
GLOBULIN SER CALC-MCNC: 4 G/DL (ref 2–4)
GLUCOSE SERPL-MCNC: 154 MG/DL (ref 74–99)
HCT VFR BLD AUTO: 39 % (ref 36–48)
HGB BLD-MCNC: 13.1 G/DL (ref 13–16)
MCH RBC QN AUTO: 28.4 PG (ref 24–34)
MCHC RBC AUTO-ENTMCNC: 33.6 G/DL (ref 31–37)
MCV RBC AUTO: 84.4 FL (ref 78–100)
NRBC # BLD: 0 K/UL (ref 0–0.01)
NRBC BLD-RTO: 0 PER 100 WBC
PLATELET # BLD AUTO: 227 K/UL (ref 135–420)
PMV BLD AUTO: 11.3 FL (ref 9.2–11.8)
POTASSIUM SERPL-SCNC: 3.6 MMOL/L (ref 3.5–5.5)
PROT SERPL-MCNC: 7 G/DL (ref 6.4–8.2)
RBC # BLD AUTO: 4.62 M/UL (ref 4.35–5.65)
SODIUM SERPL-SCNC: 139 MMOL/L (ref 136–145)
TROPONIN I SERPL HS-MCNC: 384 NG/L (ref 0–78)
TROPONIN I SERPL HS-MCNC: 432 NG/L (ref 0–78)
WBC # BLD AUTO: 6.2 K/UL (ref 4.6–13.2)

## 2023-05-02 PROCEDURE — 76705 ECHO EXAM OF ABDOMEN: CPT

## 2023-05-02 PROCEDURE — 85027 COMPLETE CBC AUTOMATED: CPT

## 2023-05-02 PROCEDURE — 6360000002 HC RX W HCPCS

## 2023-05-02 PROCEDURE — 80053 COMPREHEN METABOLIC PANEL: CPT

## 2023-05-02 PROCEDURE — 84484 ASSAY OF TROPONIN QUANT: CPT

## 2023-05-02 PROCEDURE — 96374 THER/PROPH/DIAG INJ IV PUSH: CPT

## 2023-05-02 PROCEDURE — 2580000003 HC RX 258

## 2023-05-02 PROCEDURE — 36415 COLL VENOUS BLD VENIPUNCTURE: CPT

## 2023-05-02 PROCEDURE — 6370000000 HC RX 637 (ALT 250 FOR IP): Performed by: PHYSICIAN ASSISTANT

## 2023-05-02 PROCEDURE — 6360000002 HC RX W HCPCS: Performed by: PHYSICIAN ASSISTANT

## 2023-05-02 PROCEDURE — 6370000000 HC RX 637 (ALT 250 FOR IP)

## 2023-05-02 PROCEDURE — 1100000003 HC PRIVATE W/ TELEMETRY

## 2023-05-02 PROCEDURE — 6370000000 HC RX 637 (ALT 250 FOR IP): Performed by: FAMILY MEDICINE

## 2023-05-02 PROCEDURE — 6360000002 HC RX W HCPCS: Performed by: EMERGENCY MEDICINE

## 2023-05-02 RX ORDER — ONDANSETRON 2 MG/ML
4 INJECTION INTRAMUSCULAR; INTRAVENOUS EVERY 6 HOURS PRN
Status: DISCONTINUED | OUTPATIENT
Start: 2023-05-02 | End: 2023-05-04 | Stop reason: HOSPADM

## 2023-05-02 RX ORDER — FUROSEMIDE 10 MG/ML
40 INJECTION INTRAMUSCULAR; INTRAVENOUS 2 TIMES DAILY
Status: DISCONTINUED | OUTPATIENT
Start: 2023-05-02 | End: 2023-05-04 | Stop reason: HOSPADM

## 2023-05-02 RX ORDER — ACETAMINOPHEN 650 MG/1
650 SUPPOSITORY RECTAL EVERY 6 HOURS PRN
Status: DISCONTINUED | OUTPATIENT
Start: 2023-05-02 | End: 2023-05-04 | Stop reason: HOSPADM

## 2023-05-02 RX ORDER — SODIUM CHLORIDE 0.9 % (FLUSH) 0.9 %
5-40 SYRINGE (ML) INJECTION PRN
Status: DISCONTINUED | OUTPATIENT
Start: 2023-05-02 | End: 2023-05-04 | Stop reason: HOSPADM

## 2023-05-02 RX ORDER — ISOSORBIDE MONONITRATE 30 MG/1
30 TABLET, EXTENDED RELEASE ORAL ONCE
Status: COMPLETED | OUTPATIENT
Start: 2023-05-02 | End: 2023-05-02

## 2023-05-02 RX ORDER — FUROSEMIDE 40 MG/1
40 TABLET ORAL DAILY
Status: DISCONTINUED | OUTPATIENT
Start: 2023-05-02 | End: 2023-05-04 | Stop reason: HOSPADM

## 2023-05-02 RX ORDER — CARVEDILOL 6.25 MG/1
6.25 TABLET ORAL 2 TIMES DAILY WITH MEALS
Status: DISCONTINUED | OUTPATIENT
Start: 2023-05-02 | End: 2023-05-04 | Stop reason: HOSPADM

## 2023-05-02 RX ORDER — ACETAMINOPHEN 325 MG/1
650 TABLET ORAL EVERY 6 HOURS PRN
Status: DISCONTINUED | OUTPATIENT
Start: 2023-05-02 | End: 2023-05-04 | Stop reason: HOSPADM

## 2023-05-02 RX ORDER — ISOSORBIDE MONONITRATE 60 MG/1
60 TABLET, EXTENDED RELEASE ORAL DAILY
Status: DISCONTINUED | OUTPATIENT
Start: 2023-05-03 | End: 2023-05-04 | Stop reason: HOSPADM

## 2023-05-02 RX ORDER — FUROSEMIDE 10 MG/ML
40 INJECTION INTRAMUSCULAR; INTRAVENOUS ONCE
Status: COMPLETED | OUTPATIENT
Start: 2023-05-02 | End: 2023-05-02

## 2023-05-02 RX ORDER — HYDRALAZINE HYDROCHLORIDE 20 MG/ML
20 INJECTION INTRAMUSCULAR; INTRAVENOUS ONCE
Status: COMPLETED | OUTPATIENT
Start: 2023-05-02 | End: 2023-05-02

## 2023-05-02 RX ORDER — HEPARIN SODIUM 5000 [USP'U]/ML
5000 INJECTION, SOLUTION INTRAVENOUS; SUBCUTANEOUS EVERY 8 HOURS SCHEDULED
Status: DISCONTINUED | OUTPATIENT
Start: 2023-05-02 | End: 2023-05-04 | Stop reason: HOSPADM

## 2023-05-02 RX ORDER — FERROUS SULFATE 325(65) MG
325 TABLET ORAL 2 TIMES DAILY WITH MEALS
Status: DISCONTINUED | OUTPATIENT
Start: 2023-05-02 | End: 2023-05-04 | Stop reason: HOSPADM

## 2023-05-02 RX ORDER — MORPHINE SULFATE 4 MG/ML
4 INJECTION, SOLUTION INTRAMUSCULAR; INTRAVENOUS
Status: COMPLETED | OUTPATIENT
Start: 2023-05-02 | End: 2023-05-02

## 2023-05-02 RX ORDER — SODIUM CHLORIDE 0.9 % (FLUSH) 0.9 %
5-40 SYRINGE (ML) INJECTION EVERY 12 HOURS SCHEDULED
Status: DISCONTINUED | OUTPATIENT
Start: 2023-05-02 | End: 2023-05-04 | Stop reason: HOSPADM

## 2023-05-02 RX ORDER — ATORVASTATIN CALCIUM 40 MG/1
40 TABLET, FILM COATED ORAL DAILY
Status: DISCONTINUED | OUTPATIENT
Start: 2023-05-02 | End: 2023-05-04 | Stop reason: HOSPADM

## 2023-05-02 RX ORDER — ONDANSETRON 4 MG/1
4 TABLET, ORALLY DISINTEGRATING ORAL EVERY 8 HOURS PRN
Status: DISCONTINUED | OUTPATIENT
Start: 2023-05-02 | End: 2023-05-04 | Stop reason: HOSPADM

## 2023-05-02 RX ORDER — ASPIRIN 81 MG/1
81 TABLET, CHEWABLE ORAL DAILY
Status: DISCONTINUED | OUTPATIENT
Start: 2023-05-02 | End: 2023-05-04 | Stop reason: HOSPADM

## 2023-05-02 RX ORDER — ISOSORBIDE MONONITRATE 30 MG/1
30 TABLET, EXTENDED RELEASE ORAL DAILY
Status: DISCONTINUED | OUTPATIENT
Start: 2023-05-02 | End: 2023-05-02

## 2023-05-02 RX ORDER — SODIUM CHLORIDE 9 MG/ML
INJECTION, SOLUTION INTRAVENOUS PRN
Status: DISCONTINUED | OUTPATIENT
Start: 2023-05-02 | End: 2023-05-04 | Stop reason: HOSPADM

## 2023-05-02 RX ORDER — POLYETHYLENE GLYCOL 3350 17 G/17G
17 POWDER, FOR SOLUTION ORAL DAILY PRN
Status: DISCONTINUED | OUTPATIENT
Start: 2023-05-02 | End: 2023-05-04 | Stop reason: HOSPADM

## 2023-05-02 RX ORDER — LANOLIN ALCOHOL/MO/W.PET/CERES
325 CREAM (GRAM) TOPICAL 2 TIMES DAILY
Status: DISCONTINUED | OUTPATIENT
Start: 2023-05-02 | End: 2023-05-02

## 2023-05-02 RX ORDER — SPIRONOLACTONE 25 MG/1
25 TABLET ORAL DAILY
Status: DISCONTINUED | OUTPATIENT
Start: 2023-05-02 | End: 2023-05-04 | Stop reason: HOSPADM

## 2023-05-02 RX ADMIN — SPIRONOLACTONE 25 MG: 25 TABLET ORAL at 08:50

## 2023-05-02 RX ADMIN — FERROUS SULFATE TAB 325 MG (65 MG ELEMENTAL FE) 325 MG: 325 (65 FE) TAB at 08:50

## 2023-05-02 RX ADMIN — CARVEDILOL 6.25 MG: 6.25 TABLET, FILM COATED ORAL at 18:06

## 2023-05-02 RX ADMIN — FUROSEMIDE 40 MG: 10 INJECTION, SOLUTION INTRAMUSCULAR; INTRAVENOUS at 05:32

## 2023-05-02 RX ADMIN — EMPAGLIFLOZIN 10 MG: 10 TABLET, FILM COATED ORAL at 12:32

## 2023-05-02 RX ADMIN — HYDRALAZINE HYDROCHLORIDE 20 MG: 20 INJECTION INTRAMUSCULAR; INTRAVENOUS at 12:32

## 2023-05-02 RX ADMIN — HEPARIN SODIUM 5000 UNITS: 5000 INJECTION INTRAVENOUS; SUBCUTANEOUS at 18:06

## 2023-05-02 RX ADMIN — HEPARIN SODIUM 5000 UNITS: 5000 INJECTION INTRAVENOUS; SUBCUTANEOUS at 05:32

## 2023-05-02 RX ADMIN — SODIUM CHLORIDE, PRESERVATIVE FREE 10 ML: 5 INJECTION INTRAVENOUS at 22:00

## 2023-05-02 RX ADMIN — ASPIRIN 81 MG 81 MG: 81 TABLET ORAL at 08:50

## 2023-05-02 RX ADMIN — ISOSORBIDE MONONITRATE 30 MG: 30 TABLET, EXTENDED RELEASE ORAL at 08:50

## 2023-05-02 RX ADMIN — FUROSEMIDE 40 MG: 10 INJECTION, SOLUTION INTRAMUSCULAR; INTRAVENOUS at 22:58

## 2023-05-02 RX ADMIN — ISOSORBIDE MONONITRATE 30 MG: 30 TABLET, EXTENDED RELEASE ORAL at 12:32

## 2023-05-02 RX ADMIN — HEPARIN SODIUM 5000 UNITS: 5000 INJECTION INTRAVENOUS; SUBCUTANEOUS at 22:57

## 2023-05-02 RX ADMIN — FERROUS SULFATE TAB 325 MG (65 MG ELEMENTAL FE) 325 MG: 325 (65 FE) TAB at 18:06

## 2023-05-02 RX ADMIN — CARVEDILOL 6.25 MG: 6.25 TABLET, FILM COATED ORAL at 08:50

## 2023-05-02 RX ADMIN — FUROSEMIDE 40 MG: 10 INJECTION, SOLUTION INTRAMUSCULAR; INTRAVENOUS at 12:32

## 2023-05-02 RX ADMIN — MORPHINE SULFATE 4 MG: 4 INJECTION, SOLUTION INTRAMUSCULAR; INTRAVENOUS at 04:01

## 2023-05-02 ASSESSMENT — PAIN SCALES - GENERAL: PAINLEVEL_OUTOF10: 0

## 2023-05-02 NOTE — ED NOTES
Critical Result Notification    Received and verbally repeated the following test results TROPONIN 384  from Benedict Eden (NAME OF TECHNICIAN) on 5/2/23 (DATE), at [address] (TIME).      Katerine (NAME OF PROVIDER)         Alex Nicholson RN      Alex Nicholson RN  05/02/23 2044

## 2023-05-02 NOTE — CONSULTS
[facility] - Consult Note    Cardiology consultation request from Bill Ortega MD for evaluation and management/treatment of a/c HFrEF associated with cocaine use and medical noncompliance    Date of  Admission: 5/1/2023  9:32 PM   Primary Care Physician:  Roddy Altamirano MD    Attending Cardiologist: Dr. Hang Aguilar:     -Acute on chronic HFrEF/NICMY associated with cocaine use and dietary/medical noncompliance  Echo 09/2022 with LVEF 25-30%  Echo 2/22/2023 ([place]) with LVEF 25% with severe global hypokinesis, moderate LVH, grade II diastolic dysfunction, no hemodynamically significant valvular disease  [facility] 3/10/2023 without significant CAD  NT Pro-BNP 26,026 on admission, improved from 41,123 on 3/9/2023  -Elevated troponin at 202, likely demand in setting of HFrEF/HTN; improved from previous admission  -HTN, uncontrolled, stage II  -HLD, on Lipitor  -Polysubstance use, cessation advised     Primary cardiologist is Dr. Gerard Patience:       I saw, evaluated, interviewed and examined the patient personally. 56-year-old male with a history of nonischemic cardiomyopathy, substance abuse and other medical problem came to the hospital with worsening shortness of breath. Claims that he may be taking all his medication prescribed. Continues to do cocaine  Hemodynamically stable. Blood pressure is elevated. No significant edema. Decreased breath sound at the base. No obvious murmur  Elevated BNP  EKG reviewed  Pertinent labs reviewed    Recommendation:  Substance abuse cessation advised  Start IV Lasix twice daily. Monitor input and output and creatinine  Hold Entresto because of elevated creatinine  Continue Coreg, aspirin, Imdur.   Dose increased      Significant time spent in reviewing the case, multiple EMR databases, physician notes, reviewing pertinent labs and imaging studies  I spent significant amount of time for medical decision making and updated

## 2023-05-02 NOTE — ED NOTES
Patient resting at easily aroused, patient states \" I will better now\"     Michelle Flores RN  05/02/23 0357

## 2023-05-02 NOTE — ED PROVIDER NOTES
EMERGENCY DEPARTMENT HISTORY AND PHYSICAL EXAM      Patient Name: Vielka Chacon  MRN: [id number]  YOB: 1960  Provider: Jaqueline Garcia MD  PCP: Gene Rocha MD   Time/Date of evaluation: 3:27 AM EDT on 23    History of Presenting Illness     No chief complaint on file. History Provided By: Patient     History Shazia Stevens): Vielka Chacon is a 60 y.o. male with a PMHX of cardiomyopathy, cocaine abuse, hypertension, and prior CVA  who presents to the emergency department  by EMS C/O shortness of breath. He is also complaining of right upper quadrant abdominal pain. He is not currently on oxygen at home and is requiring supplemental oxygen via nasal cannula here. Past History     Past Medical History:  Past Medical History:   Diagnosis Date    Cardiomyopathy ([address]     Cocaine abuse ([facility] [address] [address]     HTN (hypertension)     Hypertension     Stroke [facility])     Stroke risk [id number], 2009    pt stated he had a stroke in above dates       Past Surgical History:  Past Surgical History:   Procedure Laterality Date    CARDIAC PROCEDURE Right 3/10/2023    LEFT HEART CATH / CORONARY ANGIOGRAPHY performed by Chiqui Mccarty MD at [facility] CATH LAB    CARDIAC PROCEDURE Right 3/10/2023    Left ventriculography performed by Cihqui Mccarty MD at [facility] CATH LAB    [address]    right front of the forehead    [address] [place] [address]       Family History:  Family History   Problem Relation Age of Onset    Hypertension Father     Asthma Sister     Cancer Mother     Diabetes Father        Social History:  Social History     Tobacco Use    Smoking status: Some Days     Packs/day: 1.00     Types: Cigarettes    Smokeless tobacco: Never   Substance Use Topics    Alcohol use:  Yes     Alcohol/week: 2.0 standard drinks    Drug use: No       Medications:  Current Facility-Administered Medications   Medication Dose Route Frequency Provider Last Rate Last Admin    morphine sulfate (PF) injection 4 mg  4

## 2023-05-02 NOTE — CONSULTS
[facility] - Consult Note    Cardiology consultation request from Ragini Dailey MD for evaluation and management/treatment of a/c HFrEF associated with cocaine use and medical noncompliance    Date of  Admission: 5/1/2023  9:32 PM   Primary Care Physician:  Rachel Rodriguez MD    Attending Cardiologist: Dr. Heidi Monson:     -Acute on chronic HFrEF/NICMY associated with cocaine use and dietary/medical noncompliance  Echo 09/2022 with LVEF 25-30%  Echo 2/22/2023 ([place]) with LVEF 25% with severe global hypokinesis, moderate LVH, grade II diastolic dysfunction, no hemodynamically significant valvular disease  [facility] 3/10/2023 without significant CAD  NT Pro-BNP 26,026 on admission, improved from 41,123 on 3/9/2023  -Elevated troponin at 202, likely demand in setting of HFrEF/HTN; improved from previous admission  -HTN, uncontrolled, stage II  -HLD, on Lipitor  -Polysubstance use, cessation advised     Primary cardiologist is Dr. Monica Wright:     -Continue diuresis with IV Lasix 40 mg BID (ordered), need strict I/O's (ordered) and close monitoring of renal indices while on IV diuretics.  -Entresto on hold due to elevated Cr on (slightly hemolyzed) specimen from presentation; pending repeat chemistry from this AM.  -Continue Aldactone, Jardiance for now; would recommend to hold if renal function worsens.  -Echo pending as per primary team, will review results as able. -Continue Coreg, Imdur. Will increase Imdur.  -Will consider addition of Hydralazine, dependent on renal function trend, for additional BP control.  -Discussed monitoring weight on daily basis and taking additional Lasix as needed. -Further recommendations based on test results, hospital course. History of Present Illness: This is a 60 y.o. male admitted for Acute decompensated heart failure (HCC) [I50.9]  CHF (congestive heart failure), NYHA class II, acute on chronic, diastolic ([facility] [H61.95].

## 2023-05-02 NOTE — H&P
[facility] 93.  Admission History and Physical      Patient:    Vielka Chacon      60 y.o. male            MRN:       [id number]                                                                                    Admission Date:         5/1/2023  Code status:                FULL    Vielka Chacon is a 60y.o. year old male admitted for Acute decompensated heart failure (HCC) [I50.9]  CHF (congestive heart failure), NYHA class II, combined systolic and diastolic dysfunction [P41.81].      ASSESSMENT AND PLAN  Problem List Items Addressed This Visit          Circulatory    CHF exacerbation ([facility] [address] - Primary    Relevant Orders    Transthoracic echocardiogram (TTE) limited with contrast, bubble, strain, and 3D PRN     SOB in the setting of chronic HFrEF, combined Systolic & Diastolic Dysfunction, cardiomyopathy, HTN, COPD  Ddx: primary dx to include acute exacerbation of HFrEF d/t elevated proBNP compared to previous and pt admitting to not taking lasix today and unsure of correct dosage he should be taking but pt without clinical signs of fluid overload on exam and CXR pending, could consider HTN etiology d/t pt not taking BP meds today and BP's in ED consistently >160/100, could consider COPD exacerbation although pt without wheezing on exam and SpO2 >95% on RA, could consider cardiac etiology d/t persistently elevated BP's with troponinemia although pt denies associated sx and EKG NSR but will await troponin trends, could consider substance use etiology d/t patient admitting to long term use of cocaine     -Home rx: Carvedilol 2.125 mg PO BID, Entresto 49-51 mg PO BID, Lasix 40 mg PO qd, Imdur 30 mg PO qd, Jardiance 10 mg PO qd, spironolactone 25 mg PO qd  -/112 on presentation to ED  -Sees Cardiology outpatient (unsure of last appt)  -pro-BNP 56,542 on admission as compared to 26,026 on 4/16  -Trops 384 this admission as compared to 195 on 4/17  -EKG (5/1): normal sinus rhythm, normal axis, normal

## 2023-05-02 NOTE — CARE COORDINATION
Writer spoke with patient who says he lives with his niece thierry, however his sister Carlos Eduardo Coulter [phone] helps him with getting back and forth to doctors appt. Store, etc. Patient is independent $800 in SS and $280 in food stamps    Plan: return home with no CM needs   05/02/23 0472   Service Assessment   Patient Orientation Alert and Oriented   Cognition Alert   History Provided By Patient   Primary Caregiver Self   Support Systems Family Members   PCP Verified by CM Yes   Prior Functional Level Independent in ADLs/IADLs   Current Functional Level Independent in ADLs/IADLs   Can patient return to prior living arrangement Yes   Ability to make needs known: Good   Family able to assist with home care needs: Yes   Would you like for me to discuss the discharge plan with any other family members/significant others, and if so, who?  Yes   Social/Functional History   Lives With Family   Type of [address] One level   [facility] to enter with rails   Entrance Stairs - Number of Steps 3   Bathroom Shower/Tub Tub/Shower unit   [employer] Grab bars in [address] Help From Family   ADL Assistance Independent   Homemaking Assistance Independent   Ambulation Assistance Independent   Transfer Assistance Independent   Active  No   Discharge Planning   History of falls? 0

## 2023-05-03 LAB
ALBUMIN SERPL-MCNC: 2.9 G/DL (ref 3.4–5)
ALBUMIN/GLOB SERPL: 0.7 (ref 0.8–1.7)
ALP SERPL-CCNC: 74 U/L (ref 45–117)
ALT SERPL-CCNC: 64 U/L (ref 16–61)
ANION GAP SERPL CALC-SCNC: 7 MMOL/L (ref 3–18)
ANION GAP SERPL CALC-SCNC: 8 MMOL/L (ref 3–18)
AST SERPL-CCNC: 57 U/L (ref 10–38)
BILIRUB SERPL-MCNC: 0.8 MG/DL (ref 0.2–1)
BUN SERPL-MCNC: 28 MG/DL (ref 7–18)
BUN SERPL-MCNC: 28 MG/DL (ref 7–18)
BUN/CREAT SERPL: 16 (ref 12–20)
BUN/CREAT SERPL: 17 (ref 12–20)
CALCIUM SERPL-MCNC: 8.8 MG/DL (ref 8.5–10.1)
CALCIUM SERPL-MCNC: 9 MG/DL (ref 8.5–10.1)
CHLORIDE SERPL-SCNC: 102 MMOL/L (ref 100–111)
CHLORIDE SERPL-SCNC: 107 MMOL/L (ref 100–111)
CO2 SERPL-SCNC: 25 MMOL/L (ref 21–32)
CO2 SERPL-SCNC: 27 MMOL/L (ref 21–32)
CREAT SERPL-MCNC: 1.66 MG/DL (ref 0.6–1.3)
CREAT SERPL-MCNC: 1.79 MG/DL (ref 0.6–1.3)
EKG ATRIAL RATE: 93 BPM
EKG DIAGNOSIS: NORMAL
EKG P AXIS: 68 DEGREES
EKG P-R INTERVAL: 184 MS
EKG Q-T INTERVAL: 386 MS
EKG QRS DURATION: 92 MS
EKG QTC CALCULATION (BAZETT): 479 MS
EKG R AXIS: -26 DEGREES
EKG T AXIS: 84 DEGREES
EKG VENTRICULAR RATE: 93 BPM
ERYTHROCYTE [DISTWIDTH] IN BLOOD BY AUTOMATED COUNT: 13.6 % (ref 11.6–14.5)
GLOBULIN SER CALC-MCNC: 4 G/DL (ref 2–4)
GLUCOSE BLD STRIP.AUTO-MCNC: 100 MG/DL (ref 70–110)
GLUCOSE BLD STRIP.AUTO-MCNC: 309 MG/DL (ref 70–110)
GLUCOSE SERPL-MCNC: 102 MG/DL (ref 74–99)
GLUCOSE SERPL-MCNC: 91 MG/DL (ref 74–99)
HCT VFR BLD AUTO: 37.4 % (ref 36–48)
HGB BLD-MCNC: 12.7 G/DL (ref 13–16)
MAGNESIUM SERPL-MCNC: 1.6 MG/DL (ref 1.6–2.6)
MCH RBC QN AUTO: 28.3 PG (ref 24–34)
MCHC RBC AUTO-ENTMCNC: 34 G/DL (ref 31–37)
MCV RBC AUTO: 83.5 FL (ref 78–100)
NRBC # BLD: 0 K/UL (ref 0–0.01)
NRBC BLD-RTO: 0 PER 100 WBC
PLATELET # BLD AUTO: 222 K/UL (ref 135–420)
PMV BLD AUTO: 10.8 FL (ref 9.2–11.8)
POTASSIUM SERPL-SCNC: 3.2 MMOL/L (ref 3.5–5.5)
POTASSIUM SERPL-SCNC: 4 MMOL/L (ref 3.5–5.5)
PROT SERPL-MCNC: 6.9 G/DL (ref 6.4–8.2)
RBC # BLD AUTO: 4.48 M/UL (ref 4.35–5.65)
SODIUM SERPL-SCNC: 137 MMOL/L (ref 136–145)
SODIUM SERPL-SCNC: 139 MMOL/L (ref 136–145)
WBC # BLD AUTO: 5.3 K/UL (ref 4.6–13.2)

## 2023-05-03 PROCEDURE — 6360000002 HC RX W HCPCS: Performed by: PHYSICIAN ASSISTANT

## 2023-05-03 PROCEDURE — 80053 COMPREHEN METABOLIC PANEL: CPT

## 2023-05-03 PROCEDURE — 1100000003 HC PRIVATE W/ TELEMETRY

## 2023-05-03 PROCEDURE — 6370000000 HC RX 637 (ALT 250 FOR IP): Performed by: PHYSICIAN ASSISTANT

## 2023-05-03 PROCEDURE — 2580000003 HC RX 258

## 2023-05-03 PROCEDURE — 85027 COMPLETE CBC AUTOMATED: CPT

## 2023-05-03 PROCEDURE — 6360000002 HC RX W HCPCS

## 2023-05-03 PROCEDURE — 6370000000 HC RX 637 (ALT 250 FOR IP)

## 2023-05-03 PROCEDURE — 6370000000 HC RX 637 (ALT 250 FOR IP): Performed by: FAMILY MEDICINE

## 2023-05-03 PROCEDURE — 36415 COLL VENOUS BLD VENIPUNCTURE: CPT

## 2023-05-03 PROCEDURE — 82962 GLUCOSE BLOOD TEST: CPT

## 2023-05-03 PROCEDURE — 83735 ASSAY OF MAGNESIUM: CPT

## 2023-05-03 RX ORDER — POTASSIUM CHLORIDE 20 MEQ/1
20 TABLET, EXTENDED RELEASE ORAL 2 TIMES DAILY WITH MEALS
Status: DISCONTINUED | OUTPATIENT
Start: 2023-05-03 | End: 2023-05-04

## 2023-05-03 RX ORDER — MAGNESIUM SULFATE IN WATER 40 MG/ML
2000 INJECTION, SOLUTION INTRAVENOUS ONCE
Status: COMPLETED | OUTPATIENT
Start: 2023-05-03 | End: 2023-05-03

## 2023-05-03 RX ORDER — POTASSIUM CHLORIDE 7.45 MG/ML
10 INJECTION INTRAVENOUS PRN
Status: DISCONTINUED | OUTPATIENT
Start: 2023-05-03 | End: 2023-05-03

## 2023-05-03 RX ORDER — POTASSIUM CHLORIDE 20 MEQ/1
40 TABLET, EXTENDED RELEASE ORAL PRN
Status: DISCONTINUED | OUTPATIENT
Start: 2023-05-03 | End: 2023-05-03

## 2023-05-03 RX ADMIN — SODIUM CHLORIDE, PRESERVATIVE FREE 10 ML: 5 INJECTION INTRAVENOUS at 20:49

## 2023-05-03 RX ADMIN — HEPARIN SODIUM 5000 UNITS: 5000 INJECTION INTRAVENOUS; SUBCUTANEOUS at 20:48

## 2023-05-03 RX ADMIN — POTASSIUM CHLORIDE 20 MEQ: 1500 TABLET, EXTENDED RELEASE ORAL at 17:59

## 2023-05-03 RX ADMIN — FERROUS SULFATE TAB 325 MG (65 MG ELEMENTAL FE) 325 MG: 325 (65 FE) TAB at 17:59

## 2023-05-03 RX ADMIN — CARVEDILOL 6.25 MG: 6.25 TABLET, FILM COATED ORAL at 10:31

## 2023-05-03 RX ADMIN — EMPAGLIFLOZIN 10 MG: 10 TABLET, FILM COATED ORAL at 10:31

## 2023-05-03 RX ADMIN — FUROSEMIDE 40 MG: 10 INJECTION, SOLUTION INTRAMUSCULAR; INTRAVENOUS at 17:59

## 2023-05-03 RX ADMIN — SODIUM CHLORIDE, PRESERVATIVE FREE 10 ML: 5 INJECTION INTRAVENOUS at 09:00

## 2023-05-03 RX ADMIN — ASPIRIN 81 MG 81 MG: 81 TABLET ORAL at 10:31

## 2023-05-03 RX ADMIN — POTASSIUM CHLORIDE 20 MEQ: 1500 TABLET, EXTENDED RELEASE ORAL at 10:39

## 2023-05-03 RX ADMIN — FERROUS SULFATE TAB 325 MG (65 MG ELEMENTAL FE) 325 MG: 325 (65 FE) TAB at 10:32

## 2023-05-03 RX ADMIN — ISOSORBIDE MONONITRATE 60 MG: 60 TABLET, EXTENDED RELEASE ORAL at 10:32

## 2023-05-03 RX ADMIN — HEPARIN SODIUM 5000 UNITS: 5000 INJECTION INTRAVENOUS; SUBCUTANEOUS at 14:30

## 2023-05-03 RX ADMIN — MAGNESIUM SULFATE HEPTAHYDRATE 2000 MG: 40 INJECTION, SOLUTION INTRAVENOUS at 12:04

## 2023-05-03 RX ADMIN — FUROSEMIDE 40 MG: 10 INJECTION, SOLUTION INTRAMUSCULAR; INTRAVENOUS at 10:31

## 2023-05-03 RX ADMIN — SPIRONOLACTONE 25 MG: 25 TABLET ORAL at 10:32

## 2023-05-03 RX ADMIN — HEPARIN SODIUM 5000 UNITS: 5000 INJECTION INTRAVENOUS; SUBCUTANEOUS at 05:47

## 2023-05-03 RX ADMIN — CARVEDILOL 6.25 MG: 6.25 TABLET, FILM COATED ORAL at 17:59

## 2023-05-03 ASSESSMENT — PAIN SCALES - GENERAL
PAINLEVEL_OUTOF10: 0

## 2023-05-03 ASSESSMENT — PAIN SCALES - WONG BAKER: WONGBAKER_NUMERICALRESPONSE: 0

## 2023-05-03 NOTE — PLAN OF CARE
Problem: Discharge Planning  Goal: Discharge to home or other facility with appropriate resources  5/3/2023 1408 by Yue Rangel RN  Outcome: Progressing  5/3/2023 0655 by Bonney Snellen, RN  Outcome: Progressing     Problem: Chronic Conditions and Co-morbidities  Goal: Patient's chronic conditions and co-morbidity symptoms are monitored and maintained or improved  5/3/2023 1408 by Yue Rangel RN  Outcome: Progressing  5/3/2023 0655 by Bonney Snellen, RN  Outcome: Progressing  Flowsheets (Taken 5/2/2023 1945)  Care Plan - Patient's Chronic Conditions and Co-Morbidity Symptoms are Monitored and Maintained or Improved: Monitor and assess patient's chronic conditions and comorbid symptoms for stability, deterioration, or improvement     Problem: Pain  Goal: Verbalizes/displays adequate comfort level or baseline comfort level  5/3/2023 1408 by Yue Rangel RN  Outcome: Progressing  5/3/2023 0655 by Bonney Snellen, RN  Outcome: Progressing

## 2023-05-03 NOTE — PLAN OF CARE
Problem: Discharge Planning  Goal: Discharge to home or other facility with appropriate resources  Outcome: Progressing     Problem: Chronic Conditions and Co-morbidities  Goal: Patient's chronic conditions and co-morbidity symptoms are monitored and maintained or improved  Outcome: Progressing  Flowsheets (Taken 5/2/2023 1945)  Care Plan - Patient's Chronic Conditions and Co-Morbidity Symptoms are Monitored and Maintained or Improved: Monitor and assess patient's chronic conditions and comorbid symptoms for stability, deterioration, or improvement     Problem: Pain  Goal: Verbalizes/displays adequate comfort level or baseline comfort level  Outcome: Progressing

## 2023-05-04 VITALS
HEIGHT: 67 IN | DIASTOLIC BLOOD PRESSURE: 83 MMHG | BODY MASS INDEX: 23.74 KG/M2 | WEIGHT: 151.24 LBS | OXYGEN SATURATION: 96 % | SYSTOLIC BLOOD PRESSURE: 125 MMHG | HEART RATE: 62 BPM | TEMPERATURE: 97.7 F | RESPIRATION RATE: 18 BRPM

## 2023-05-04 LAB
ALBUMIN SERPL-MCNC: 3.1 G/DL (ref 3.4–5)
ALBUMIN/GLOB SERPL: 0.7 (ref 0.8–1.7)
ALP SERPL-CCNC: 76 U/L (ref 45–117)
ALT SERPL-CCNC: 52 U/L (ref 16–61)
ANION GAP SERPL CALC-SCNC: 8 MMOL/L (ref 3–18)
AST SERPL-CCNC: 41 U/L (ref 10–38)
BILIRUB SERPL-MCNC: 0.8 MG/DL (ref 0.2–1)
BUN SERPL-MCNC: 26 MG/DL (ref 7–18)
BUN/CREAT SERPL: 17 (ref 12–20)
CALCIUM SERPL-MCNC: 9 MG/DL (ref 8.5–10.1)
CHLORIDE SERPL-SCNC: 100 MMOL/L (ref 100–111)
CO2 SERPL-SCNC: 27 MMOL/L (ref 21–32)
CREAT SERPL-MCNC: 1.53 MG/DL (ref 0.6–1.3)
ERYTHROCYTE [DISTWIDTH] IN BLOOD BY AUTOMATED COUNT: 13.4 % (ref 11.6–14.5)
GLOBULIN SER CALC-MCNC: 4.2 G/DL (ref 2–4)
GLUCOSE BLD STRIP.AUTO-MCNC: 100 MG/DL (ref 70–110)
GLUCOSE SERPL-MCNC: 99 MG/DL (ref 74–99)
HCT VFR BLD AUTO: 39.4 % (ref 36–48)
HGB BLD-MCNC: 13.4 G/DL (ref 13–16)
MAGNESIUM SERPL-MCNC: 1.8 MG/DL (ref 1.6–2.6)
MCH RBC QN AUTO: 28.3 PG (ref 24–34)
MCHC RBC AUTO-ENTMCNC: 34 G/DL (ref 31–37)
MCV RBC AUTO: 83.3 FL (ref 78–100)
NRBC # BLD: 0 K/UL (ref 0–0.01)
NRBC BLD-RTO: 0 PER 100 WBC
PLATELET # BLD AUTO: 238 K/UL (ref 135–420)
PMV BLD AUTO: 11.3 FL (ref 9.2–11.8)
POTASSIUM SERPL-SCNC: 3.3 MMOL/L (ref 3.5–5.5)
PROT SERPL-MCNC: 7.3 G/DL (ref 6.4–8.2)
RBC # BLD AUTO: 4.73 M/UL (ref 4.35–5.65)
SODIUM SERPL-SCNC: 135 MMOL/L (ref 136–145)
WBC # BLD AUTO: 5.5 K/UL (ref 4.6–13.2)

## 2023-05-04 PROCEDURE — 80053 COMPREHEN METABOLIC PANEL: CPT

## 2023-05-04 PROCEDURE — 83735 ASSAY OF MAGNESIUM: CPT

## 2023-05-04 PROCEDURE — 85027 COMPLETE CBC AUTOMATED: CPT

## 2023-05-04 PROCEDURE — 6370000000 HC RX 637 (ALT 250 FOR IP): Performed by: FAMILY MEDICINE

## 2023-05-04 PROCEDURE — 82962 GLUCOSE BLOOD TEST: CPT

## 2023-05-04 PROCEDURE — 6370000000 HC RX 637 (ALT 250 FOR IP)

## 2023-05-04 PROCEDURE — 6360000002 HC RX W HCPCS

## 2023-05-04 PROCEDURE — 6370000000 HC RX 637 (ALT 250 FOR IP): Performed by: PHYSICIAN ASSISTANT

## 2023-05-04 PROCEDURE — 2580000003 HC RX 258

## 2023-05-04 PROCEDURE — 36415 COLL VENOUS BLD VENIPUNCTURE: CPT

## 2023-05-04 RX ORDER — ISOSORBIDE MONONITRATE 60 MG/1
60 TABLET, EXTENDED RELEASE ORAL DAILY
Qty: 30 TABLET | Refills: 3 | Status: SHIPPED | OUTPATIENT
Start: 2023-05-05

## 2023-05-04 RX ORDER — POTASSIUM CHLORIDE 20 MEQ/1
20 TABLET, EXTENDED RELEASE ORAL ONCE
Status: COMPLETED | OUTPATIENT
Start: 2023-05-04 | End: 2023-05-04

## 2023-05-04 RX ORDER — MAGNESIUM SULFATE 1 G/100ML
1000 INJECTION INTRAVENOUS ONCE
Status: COMPLETED | OUTPATIENT
Start: 2023-05-04 | End: 2023-05-04

## 2023-05-04 RX ORDER — POTASSIUM CHLORIDE 20 MEQ/1
20 TABLET, EXTENDED RELEASE ORAL
Status: DISCONTINUED | OUTPATIENT
Start: 2023-05-04 | End: 2023-05-04 | Stop reason: HOSPADM

## 2023-05-04 RX ADMIN — MAGNESIUM SULFATE HEPTAHYDRATE 1000 MG: 1 INJECTION, SOLUTION INTRAVENOUS at 09:54

## 2023-05-04 RX ADMIN — POTASSIUM CHLORIDE 20 MEQ: 1500 TABLET, EXTENDED RELEASE ORAL at 09:53

## 2023-05-04 RX ADMIN — CARVEDILOL 6.25 MG: 6.25 TABLET, FILM COATED ORAL at 09:54

## 2023-05-04 RX ADMIN — SODIUM CHLORIDE, PRESERVATIVE FREE 5 ML: 5 INJECTION INTRAVENOUS at 09:56

## 2023-05-04 RX ADMIN — FERROUS SULFATE TAB 325 MG (65 MG ELEMENTAL FE) 325 MG: 325 (65 FE) TAB at 09:53

## 2023-05-04 RX ADMIN — HEPARIN SODIUM 5000 UNITS: 5000 INJECTION INTRAVENOUS; SUBCUTANEOUS at 06:10

## 2023-05-04 RX ADMIN — FUROSEMIDE 40 MG: 40 TABLET ORAL at 09:54

## 2023-05-04 RX ADMIN — EMPAGLIFLOZIN 10 MG: 10 TABLET, FILM COATED ORAL at 09:53

## 2023-05-04 RX ADMIN — POTASSIUM CHLORIDE 20 MEQ: 1500 TABLET, EXTENDED RELEASE ORAL at 11:21

## 2023-05-04 RX ADMIN — HEPARIN SODIUM 5000 UNITS: 5000 INJECTION INTRAVENOUS; SUBCUTANEOUS at 13:01

## 2023-05-04 RX ADMIN — ISOSORBIDE MONONITRATE 60 MG: 60 TABLET, EXTENDED RELEASE ORAL at 09:53

## 2023-05-04 RX ADMIN — POTASSIUM CHLORIDE 20 MEQ: 1500 TABLET, EXTENDED RELEASE ORAL at 09:57

## 2023-05-04 RX ADMIN — ASPIRIN 81 MG 81 MG: 81 TABLET ORAL at 09:53

## 2023-05-04 RX ADMIN — SPIRONOLACTONE 25 MG: 25 TABLET ORAL at 09:53

## 2023-05-04 ASSESSMENT — PAIN SCALES - GENERAL
PAINLEVEL_OUTOF10: 0

## 2023-05-04 NOTE — DISCHARGE INSTR - DIET

## 2023-05-04 NOTE — PLAN OF CARE
Problem: Discharge Planning  Goal: Discharge to home or other facility with appropriate resources  5/4/2023 0011 by Nehemiah Thakur RN  Outcome: Progressing  5/3/2023 1408 by Tank Nolasco RN  Outcome: Progressing     Problem: Chronic Conditions and Co-morbidities  Goal: Patient's chronic conditions and co-morbidity symptoms are monitored and maintained or improved  5/4/2023 0011 by Nehemiah Thakur RN  Outcome: Progressing  5/3/2023 1408 by Tank Nolasco RN  Outcome: Progressing     Problem: Pain  Goal: Verbalizes/displays adequate comfort level or baseline comfort level  5/4/2023 0011 by Nehemiah Thakur RN  Outcome: Progressing  5/3/2023 1408 by Tank Nolasco RN  Outcome: Progressing

## 2023-05-04 NOTE — DISCHARGE SUMMARY
Rosanne [facility] SUMMARY      Name:   Alicia Sullivan 60 y.o. male  MRN:   [id number]  CSN:   [id number]  Admission Date:  5/1/2023  Discharge Date:  5/4/2023  Attending:             Jenni Parks MD   PCP:              Claudio Locke MD   ================================================================  Reason for Admission:  Acute on chronic systolic congestive heart failure ([address] [I50.23]  Acute decompensated heart failure (HCC) [I50.9]  CHF (congestive heart failure), NYHA class II, acute on chronic, diastolic ([address] [L19.69]  Hypertension, unspecified type [I10]    Discharge Diagnosis:    SOB on chronic HFrEF (systolic and diastolic dysfunction)   Cardiomyopathy   HTN  COPD  Abdominal pain, Transaminitis, Hep C   CKD IIIa, SAMSON  Polysubstance use  Hx CVA  Anemia       Follow-up studies/evaluations for PCP/Important Notes to PCP:  Confirm medication regimen with patient. Determine appropriateness of restarting Statin   Evaluate for treatment of Hep C. Consider repeat LFT's. Vaccinate for Hep B  Confirm Follow up with Cardiology in 2 weeks (Dr. Trena Viramontes, [facility])   Consider repeat BMP to check Potassium and Magnesium levels   Assess any drug/cocaine use since discharge     Medication reconciliation:  Discontinued Medications: atorvastatin   Changed Medications: increased Imdur to 60mg QD    VIGNESH Follow Up Appointment: 5/9/2023 at 2:00pm at [address] [facility] with Dr. Crow Grossman     Readmission prevention plan: Adhere to Medication regimen   Avoid cocaine use     GOALS OF CARE (including Code Status, Advanced Care Plan):    Full measures    Pending labs/ investigations at discharge to follow up:   None     Operative Procedures:   None     Consultants:    Cardiology     Condition at discharge: Stable    Disposition at Discharge:  Home    Functional Status at Discharge: Ambulates independently with no assistive devices     Diet: low sodium diet     Discharge Medications:

## 2023-05-04 NOTE — PLAN OF CARE
Problem: Discharge Planning  Goal: Discharge to home or other facility with appropriate resources  5/4/2023 1322 by Joe Bernardo RN  Outcome: Completed  5/4/2023 0011 by Valeriy Diez RN  Outcome: Progressing     Problem: Chronic Conditions and Co-morbidities  Goal: Patient's chronic conditions and co-morbidity symptoms are monitored and maintained or improved  5/4/2023 1322 by Joe Bernardo RN  Outcome: Completed  5/4/2023 0011 by Valeriy Diez RN  Outcome: Progressing     Problem: Pain  Goal: Verbalizes/displays adequate comfort level or baseline comfort level  5/4/2023 1322 by Joe Bernardo RN  Outcome: Completed  Flowsheets (Taken 5/4/2023 1254)  Verbalizes/displays adequate comfort level or baseline comfort level: Encourage patient to monitor pain and request assistance  5/4/2023 0011 by Valeriy Diez RN  Outcome: Progressing

## 2023-05-04 NOTE — PROGRESS NOTES
completed the initial Spiritual Assessment of the patient, and offered Pastoral Care  support to the patient in room 19 of the emergency room where he will most likely be admitted to the hospital from. There is no advance directive on file. Patient does not have any Yarsanism/cultural needs that will affect patients preferences in health care. Chaplains will continue to follow and will provide pastoral care on an as needed/requested basis.     [facility] Care Department  [phone]
[facility] - Progress Note    Admit Date: 5/1/2023  Attending Cardiologist: Dr. Yahir Larson:     -Acute on chronic HFrEF/NICMY associated with cocaine use and dietary/medical noncompliance  Echo 09/2022 with LVEF 25-30%  Echo 2/22/2023 DR [facility]LOYD [facility]) with LVEF 25% with severe global hypokinesis, moderate LVH, grade II diastolic dysfunction, no hemodynamically significant valvular disease  [facility] 3/10/2023 without significant CAD  NT Pro-BNP 26,026 on admission, improved from 41,123 on 3/9/2023  -Elevated troponin at 202, likely demand in setting of HFrEF/HTN; improved from previous admission  -HTN, uncontrolled, stage II  -HLD, on Lipitor  -Polysubstance use, cessation advised     Primary cardiologist is Dr. Roshni Houser:       I saw, evaluated, interviewed and examined the patient personally. Overall much improvement in dyspnea. No significant edema. Hemodynamically stable  Minimal Rales. Creatinine stable  Agree with continuing IV Lasix today and likely transition to oral tomorrow  Would restart guideline directed medical management tomorrow  Electrolyte supplement as mentioned below. Pam Zuniga MD       -Continue IV Lasix with strict I/O's and close monitoring of renal indices.  -Continue Coreg, Imdur, Aldactone.  -Resume Entresto if BP stable and renal function remains stable once adequately diuresed. -Mg 1.6 this AM, will give 2 gm IV Mg, recommend to maintain K > 4 and Mg > 2 from cardiac standpoint.  -Will continue to follow. Subjective:     No new complaints. States breathing is improving.     Objective:      Patient Vitals for the past 8 hrs:   Temp Pulse Resp BP SpO2   05/03/23 0758 97.6 °F (36.4 °C) 78 18 (!) 153/95 95 %   05/03/23 0415 97.8 °F (36.6 °C) 58 16 (!) 145/92 97 %         Patient Vitals for the past 96 hrs:   Weight   05/02/23 1945 152 lb 8.9 oz (69.2 kg)                Current Facility-Administered Medications   Medication Dose Route Frequency
[facility] 93.  DAILY PROGRESS NOTE      Patient:    Gill Olvera , 60 y.o. male   MRN:  [id number]  Room/Bed:  212/01  Admission Date:   5/1/2023  Code status:  Full Code    Reason for Admission:     ASSESSMENT AND PLAN:   Problem List Items Addressed This Visit          Circulatory    Hypertension    Relevant Medications    isosorbide mononitrate (IMDUR) extended release tablet 60 mg    RESOLVED: CHF exacerbation (HCC) - Primary    Relevant Medications    furosemide (LASIX) injection 40 mg    isosorbide mononitrate (IMDUR) extended release tablet 60 mg     Gill Olvera is a 60 y.o. male with PMHx of HFrEF, cardiomyopathy, HTN, COPD, tobacco use, cocaine use, CKD, h/o CVA x2, h/o HCV who presented to [facility] ED on 5/1/2023 with complaint of SOB. Admitted with acute on chronic heart failure. Patient endorsed eating a salty meal a few days prior as well as the use of cocaine either Saturday 4/29 or Sunday 4/30. He was hypertensive in the ED, received one dose of 40mg IV lasix then BID daily. Labs had elevated Cr on admission - likely cardiorenal and improving. His volume status has been improving and warrants transition to oral lasix. Discharge pending final recommendations from cardiology today    SOB in the setting of chronic HFrEF, combined Systolic & Diastolic Dysfunction, cardiomyopathy, HTN, COPD  Ddx: primary dx to include acute exacerbation of HFrEF d/t elevated proBNP compared to previous, pt admitting to not taking lasix at home and recently using cocaine. Pt with signs of fluid overload confirmed by CXR.  Could consider HTN etiology d/t pt not taking BP meds and in ED consistently >160/100,  could consider cardiac etiology d/t persistently elevated BP's with troponinemia although pt denies associated sx and EKG NSR   -Home rx: Carvedilol 2.125 mg PO BID, Entresto 49-51 mg PO BID, Lasix 40 mg PO qd, Imdur 30 mg PO qd, Jardiance 10 mg PO qd, spironolactone 25 mg PO qd  -/112 on presentation
Patient was ordered for blood glucose checks prior to meals and at HS however the patient ate his breakfast prior to the glucose that registered 309 this morning. The patient denies diabetes.  Then at lunch he was 100 prior to the meal
Please measure O2 sat while seated in bed with the O2NC; 2 minutes later, please measure O2 sat while seated in bed without the O2NC. If O2 sat in room air was >92%, then please walk the patient without oxygen and measure O2 sat. If patient's O2 sat while walking is <92%, please apply 2LNC and perform walking test to see if patient can keep O2 sat >92%. Please document these values in the chart either in the flowsheets or as a separate note. Thank you for the help.       Jonh Sheridan MD, PGY-1  [facility]  May 3, 2023 3:39 PM
[facility]  DAILY PROGRESS NOTE    *ATTENTION:  This note has been created by a medical student for educational purposes only. Please do not refer to the content of this note for clinical decision-making, billing, or other purposes. Please see attending physicians note to obtain clinical information on this patient. *     **MEDICAL STUDENT DAILY PROGRESS NOTE - FOR EDUCATIONAL PURPOSES ONLY**    Patient:    Marques Andrade , 60 y.o. male   MRN:  [id number]  Room/Bed:  ER19/19  Admission Date:   5/1/2023  Code status:  Full Code    Reason for Admission: acute congestive heart failure exacerbation     ASSESSMENT AND PLAN:   Problem List Items Addressed This Visit          Circulatory    CHF exacerbation ([address] - Primary    Relevant Orders    Transthoracic echocardiogram (TTE) limited with contrast, bubble, strain, and 3D PRN     Pricilla Sweet is a 61 yo M w/ PMHx of HFrEF with EF 25-30%, nonischemic cardiomyopathy, HTN, HLD, COPD, polysubstance use (cocaine, opioids, tobacco), CKD3, and CVA x2 admitted for decompensated congestive HF.      SOB in the setting of chronic HFrEF, combined Systolic & Diastolic Dysfunction, cardiomyopathy, HTN, COPD  -Home rx: Carvedilol 2.125 mg PO BID, Entresto 49-51 mg PO BID, Lasix 40 mg PO qd, Imdur 30 mg PO qd, Jardiance 10 mg PO qd, spironolactone 25 mg PO qd  -/112 on presentation to ED  -Sees Cardiology outpatient (unsure of last appointment)  -pro-BNP 57,567 on admission as compared to 26,026 on 4/16  -Trops 384 -> 432 this admission as compared to 195 on 4/17  -EKG (5/1): normal sinus rhythm, normal axis, normal intervals, flat T's throughout most leads  -Last echo (9/13/22): EF of 25-30%, Grade II diastolic function with increased LAP, dilated LV, mild septal thickening, global hypokinesis   -[facility] on 3/10/23 without significant CAD  -On 2 L NC O2 in ED with SpO2 >95%  -Physical exam on admission largely unremarkable with no peripheral edema, lungs were CTAB
Vitals  /69   Pulse 70   Temp 98 °F (36.7 °C) (Oral)   Resp 18   Ht 5' 7\" (1.702 m)   Wt 152 lb 8.9 oz (69.2 kg)   SpO2 95%   BMI 23.89 kg/m²       Data Review:     Labs: Results:       Chemistry Recent Labs     05/02/23 2146 05/03/23 0355 05/03/23 1908 05/04/23 0456    139 137 135*   K 3.6 3.2* 4.0 3.3*    107 102 100   CO2 25 25 27 27   BUN 28* 28* 28* 26*   CREATININE 1.72* 1.66* 1.79* 1.53*   MG  --  1.6  --  1.8   GLOB 4.0 4.0  --  4.2*      CBC w/Diff Recent Labs     05/02/23 2146 05/03/23 0355 05/04/23 0456   WBC 6.2 5.3 5.5   RBC 4.62 4.48 4.73   HGB 13.1 12.7* 13.4   HCT 39.0 37.4 39.4    222 238      Cardiac Enzymes Lab Results   Component Value Date/Time    TROPHS 432 05/02/2023 04:03 AM    TROPHS 384 05/01/2023 11:45 PM    TROPHS 195 04/17/2023 04:37 PM      BNP Lab Results   Component Value Date/Time    NTPROBNP 56,542 05/01/2023 10:04 PM      Liver Enzymes Lab Results   Component Value Date    ALT 52 05/04/2023    AST 41 (H) 05/04/2023    ALKPHOS 76 05/04/2023    BILITOT 0.8 05/04/2023        Signed By: Colin Posadas PA-C     May 4, 2023
Range    POC Glucose 309 (H) 70 - 110 mg/dL   POCT Glucose    Collection Time: 05/03/23 11:59 AM   Result Value Ref Range    POC Glucose 100 70 - 110 mg/dL   Basic Metabolic Panel    Collection Time: 05/03/23  7:08 PM   Result Value Ref Range    Sodium 137 136 - 145 mmol/L    Potassium 4.0 3.5 - 5.5 mmol/L    Chloride 102 100 - 111 mmol/L    CO2 27 21 - 32 mmol/L    Anion Gap 8 3.0 - 18 mmol/L    Glucose 91 74 - 99 mg/dL    BUN 28 (H) 7.0 - 18 MG/DL    Creatinine 1.79 (H) 0.6 - 1.3 MG/DL    Bun/Cre Ratio 16 12 - 20      Est, Glom Filt Rate 42 (L) >60 ml/min/1.73m2    Calcium 9.0 8.5 - 10.1 MG/DL   Comprehensive Metabolic Panel w/ Reflex to MG    Collection Time: 05/04/23  4:56 AM   Result Value Ref Range    Sodium 135 (L) 136 - 145 mmol/L    Potassium 3.3 (L) 3.5 - 5.5 mmol/L    Chloride 100 100 - 111 mmol/L    CO2 27 21 - 32 mmol/L    Anion Gap 8 3.0 - 18 mmol/L    Glucose 99 74 - 99 mg/dL    BUN 26 (H) 7.0 - 18 MG/DL    Creatinine 1.53 (H) 0.6 - 1.3 MG/DL    Bun/Cre Ratio 17 12 - 20      Est, Glom Filt Rate 51 (L) >60 ml/min/1.73m2    Calcium 9.0 8.5 - 10.1 MG/DL    Total Bilirubin 0.8 0.2 - 1.0 MG/DL    ALT 52 16 - 61 U/L    AST 41 (H) 10 - 38 U/L    Alk Phosphatase 76 45 - 117 U/L    Total Protein 7.3 6.4 - 8.2 g/dL    Albumin 3.1 (L) 3.4 - 5.0 g/dL    Globulin 4.2 (H) 2.0 - 4.0 g/dL    Albumin/Globulin Ratio 0.7 (L) 0.8 - 1.7     CBC    Collection Time: 05/04/23  4:56 AM   Result Value Ref Range    WBC 5.5 4.6 - 13.2 K/uL    RBC 4.73 4.35 - 5.65 M/uL    Hemoglobin 13.4 13.0 - 16.0 g/dL    Hematocrit 39.4 36.0 - 48.0 %    MCV 83.3 78.0 - 100.0 FL    MCH 28.3 24.0 - 34.0 PG    MCHC 34.0 31.0 - 37.0 g/dL    RDW 13.4 11.6 - 14.5 %    Platelets 453 316 - 918 K/uL    MPV 11.3 9.2 - 11.8 FL    Nucleated RBCs 0.0 0  WBC    nRBC 0.00 0.00 - 0.01 K/uL   Magnesium    Collection Time: 05/04/23  4:56 AM   Result Value Ref Range    Magnesium 1.8 1.6 - 2.6 mg/dL   POCT Glucose    Collection Time: 05/04/23  7:38 AM
13.0 - 16.0 g/dL    Hematocrit 37.4 36.0 - 48.0 %    MCV 83.5 78.0 - 100.0 FL    MCH 28.3 24.0 - 34.0 PG    MCHC 34.0 31.0 - 37.0 g/dL    RDW 13.6 11.6 - 14.5 %    Platelets 661 976 - 582 K/uL    MPV 10.8 9.2 - 11.8 FL    Nucleated RBCs 0.0 0  WBC    nRBC 0.00 0.00 - 0.01 K/uL   Magnesium    Collection Time: 05/03/23  3:55 AM   Result Value Ref Range    Magnesium 1.6 1.6 - 2.6 mg/dL         Imaging results last 24hrs (Impression only):  Chest XR on 5/1/23: IMPRESSION:     Cardiomegaly with congestive heart failure and pulmonary edema. Heart failure is  worse when compared to 4/16/2023. Pending signed report for US Abdomen Limited completed on 5/2/2023.    ================================================================  Further management for Mr. Duke Lopez will be discussed on rounds with my attending.       Colin Mota MS3  [facility]  May 3, 2023 7:54 AM    **MEDICAL STUDENT DAILY PROGRESS NOTE - FOR EDUCATIONAL PURPOSES ONLY**
34.0 31.0 - 37.0 g/dL    RDW 13.9 11.6 - 14.5 %    Platelets 460 690 - 850 K/uL    MPV 10.7 9.2 - 11.8 FL    Nucleated RBCs 0.0 0  WBC    nRBC 0.00 0.00 - 0.01 K/uL    Seg Neutrophils 55 40 - 73 %    Lymphocytes 31 21 - 52 %    Monocytes 14 (H) 3 - 10 %    Eosinophils % 0 0 - 5 %    Basophils 0 0 - 2 %    Immature Granulocytes 0 0.0 - 0.5 %    Segs Absolute 3.3 1.8 - 8.0 K/UL    Absolute Lymph # 1.9 0.9 - 3.6 K/UL    Absolute Mono # 0.8 0.05 - 1.2 K/UL    Absolute Eos # 0.0 0.0 - 0.4 K/UL    Basophils Absolute 0.0 0.0 - 0.1 K/UL    Absolute Immature Granulocyte 0.0 0.00 - 0.04 K/UL    Differential Type AUTOMATED     Comprehensive Metabolic Panel    Collection Time: 05/01/23 10:04 PM   Result Value Ref Range    Sodium 138 136 - 145 mmol/L    Potassium 4.0 3.5 - 5.5 mmol/L    Chloride 109 100 - 111 mmol/L    CO2 22 21 - 32 mmol/L    Anion Gap 7 3.0 - 18 mmol/L    Glucose 155 (H) 74 - 99 mg/dL    BUN 24 (H) 7.0 - 18 MG/DL    Creatinine 1.95 (H) 0.6 - 1.3 MG/DL    Bun/Cre Ratio 12 12 - 20      Est, Glom Filt Rate 38 (L) >60 ml/min/1.73m2    Calcium 8.9 8.5 - 10.1 MG/DL    Total Bilirubin 0.8 0.2 - 1.0 MG/DL    ALT 80 (H) 16 - 61 U/L    AST 91 (H) 10 - 38 U/L    Alk Phosphatase 87 45 - 117 U/L    Total Protein 7.6 6.4 - 8.2 g/dL    Albumin 3.6 3.4 - 5.0 g/dL    Globulin 4.0 2.0 - 4.0 g/dL    Albumin/Globulin Ratio 0.9 0.8 - 1.7     Brain Natriuretic Peptide    Collection Time: 05/01/23 10:04 PM   Result Value Ref Range    NT Pro-BNP 56,542 (H) 0 - 900 PG/ML   Troponin    Collection Time: 05/01/23 11:45 PM   Result Value Ref Range    Troponin, High Sensitivity 384 (HH) 0 - 78 ng/L   Troponin    Collection Time: 05/02/23  4:03 AM   Result Value Ref Range    Troponin, High Sensitivity 432 (HH) 0 - 78 ng/L       IMAGING AND PROCEDURES (LAST 24 HOURS)  XR CHEST PORTABLE    Result Date: 5/2/2023  Cardiomegaly with congestive heart failure and pulmonary edema. Heart failure is worse when compared to 4/16/2023.

## 2023-05-15 ENCOUNTER — OFFICE VISIT (OUTPATIENT)
Age: 63
End: 2023-05-15
Payer: COMMERCIAL

## 2023-05-15 VITALS
HEART RATE: 74 BPM | BODY MASS INDEX: 23.7 KG/M2 | OXYGEN SATURATION: 98 % | HEIGHT: 67 IN | SYSTOLIC BLOOD PRESSURE: 140 MMHG | WEIGHT: 151 LBS | DIASTOLIC BLOOD PRESSURE: 60 MMHG

## 2023-05-15 DIAGNOSIS — I50.41 ACUTE COMBINED SYSTOLIC AND DIASTOLIC CHF, NYHA CLASS 2 (HCC): ICD-10-CM

## 2023-05-15 PROCEDURE — 3077F SYST BP >= 140 MM HG: CPT | Performed by: INTERNAL MEDICINE

## 2023-05-15 PROCEDURE — 3078F DIAST BP <80 MM HG: CPT | Performed by: INTERNAL MEDICINE

## 2023-05-15 PROCEDURE — 99214 OFFICE O/P EST MOD 30 MIN: CPT | Performed by: INTERNAL MEDICINE

## 2023-05-15 RX ORDER — ATORVASTATIN CALCIUM 40 MG/1
TABLET, FILM COATED ORAL
COMMUNITY
Start: 2023-05-09

## 2023-05-15 RX ORDER — SPIRONOLACTONE 25 MG/1
25 TABLET ORAL DAILY
Qty: 90 TABLET | Refills: 3 | Status: SHIPPED | OUTPATIENT
Start: 2023-05-15

## 2023-05-15 NOTE — PROGRESS NOTES
with    Cardiomyopathy:  Echo 09/2022 with LVEF 25-30%  [facility]23 [facility]) with LVEF 25% with severe global hypokinesis, moderate LVH, grade II diastolic dysfunction, no hemodynamically significant valvular disease  [facility] 3/10/2023 without significant CAD    Currently on Imdur, carvedilol, Entresto, spironolactone and Jardiance. No evidence of fluid overload. Hypertension: /60. Currently on Imdur, Coreg, Entresto and spironolactone. Continue same    HLD: Currently on atorvastatin 40. Polysubstance use, cessation advised    Importance of diet and exercise was discussed with patient. This plan was discussed with patient who is in agreement. Thank you for allowing me to participate in patient care. Please feel free to call me if you have any question or concern. Vance Arredondo MD  Please note: This document has been produced using voice recognition software. Unrecognized errors in transcription may be present.

## 2023-06-05 ENCOUNTER — HOSPITAL ENCOUNTER (INPATIENT)
Facility: HOSPITAL | Age: 63
LOS: 2 days | Discharge: HOME | DRG: 194 | End: 2023-06-07
Attending: EMERGENCY MEDICINE | Admitting: STUDENT IN AN ORGANIZED HEALTH CARE EDUCATION/TRAINING PROGRAM
Payer: COMMERCIAL

## 2023-06-05 ENCOUNTER — APPOINTMENT (OUTPATIENT)
Facility: HOSPITAL | Age: 63
DRG: 194 | End: 2023-06-05
Payer: COMMERCIAL

## 2023-06-05 DIAGNOSIS — N17.9 AKI (ACUTE KIDNEY INJURY) (HCC): ICD-10-CM

## 2023-06-05 DIAGNOSIS — F14.90 COCAINE USE: ICD-10-CM

## 2023-06-05 DIAGNOSIS — R06.09 DYSPNEA ON EXERTION: ICD-10-CM

## 2023-06-05 DIAGNOSIS — R77.8 ELEVATED TROPONIN: ICD-10-CM

## 2023-06-05 DIAGNOSIS — R79.89 ELEVATED LFTS: ICD-10-CM

## 2023-06-05 DIAGNOSIS — I50.23 ACUTE ON CHRONIC SYSTOLIC CONGESTIVE HEART FAILURE (HCC): Primary | ICD-10-CM

## 2023-06-05 DIAGNOSIS — I10 ESSENTIAL HYPERTENSION: ICD-10-CM

## 2023-06-05 DIAGNOSIS — I50.21 ACUTE HFREF (HEART FAILURE WITH REDUCED EJECTION FRACTION) (HCC): ICD-10-CM

## 2023-06-05 PROBLEM — R06.00 DYSPNEA: Status: ACTIVE | Noted: 2023-06-05

## 2023-06-05 LAB
ALBUMIN SERPL-MCNC: 3.4 G/DL (ref 3.4–5)
ALBUMIN/GLOB SERPL: 0.8 (ref 0.8–1.7)
ALP SERPL-CCNC: 130 U/L (ref 45–117)
ALT SERPL-CCNC: 133 U/L (ref 16–61)
AMPHET UR QL SCN: NEGATIVE
ANION GAP SERPL CALC-SCNC: 7 MMOL/L (ref 3–18)
AST SERPL-CCNC: 98 U/L (ref 10–38)
BARBITURATES UR QL SCN: NEGATIVE
BASOPHILS # BLD: 0 K/UL (ref 0–0.1)
BASOPHILS NFR BLD: 1 % (ref 0–2)
BENZODIAZ UR QL: NEGATIVE
BILIRUB SERPL-MCNC: 1 MG/DL (ref 0.2–1)
BUN SERPL-MCNC: 28 MG/DL (ref 7–18)
BUN/CREAT SERPL: 13 (ref 12–20)
CALCIUM SERPL-MCNC: 8.9 MG/DL (ref 8.5–10.1)
CANNABINOIDS UR QL SCN: NEGATIVE
CHLORIDE SERPL-SCNC: 108 MMOL/L (ref 100–111)
CO2 SERPL-SCNC: 27 MMOL/L (ref 21–32)
COCAINE UR QL SCN: POSITIVE
CREAT SERPL-MCNC: 2.15 MG/DL (ref 0.6–1.3)
DIFFERENTIAL METHOD BLD: ABNORMAL
EKG ATRIAL RATE: 74 BPM
EKG DIAGNOSIS: NORMAL
EKG P AXIS: 77 DEGREES
EKG P-R INTERVAL: 180 MS
EKG Q-T INTERVAL: 424 MS
EKG QRS DURATION: 92 MS
EKG QTC CALCULATION (BAZETT): 470 MS
EKG R AXIS: -19 DEGREES
EKG T AXIS: 108 DEGREES
EKG VENTRICULAR RATE: 74 BPM
EOSINOPHIL # BLD: 0 K/UL (ref 0–0.4)
EOSINOPHIL NFR BLD: 0 % (ref 0–5)
ERYTHROCYTE [DISTWIDTH] IN BLOOD BY AUTOMATED COUNT: 14.2 % (ref 11.6–14.5)
GLOBULIN SER CALC-MCNC: 4.1 G/DL (ref 2–4)
GLUCOSE SERPL-MCNC: 191 MG/DL (ref 74–99)
HCT VFR BLD AUTO: 37.7 % (ref 36–48)
HGB BLD-MCNC: 12.4 G/DL (ref 13–16)
IMM GRANULOCYTES # BLD AUTO: 0 K/UL (ref 0–0.04)
IMM GRANULOCYTES NFR BLD AUTO: 1 % (ref 0–0.5)
LYMPHOCYTES # BLD: 1.5 K/UL (ref 0.9–3.6)
LYMPHOCYTES NFR BLD: 24 % (ref 21–52)
Lab: ABNORMAL
MAGNESIUM SERPL-MCNC: 1.7 MG/DL (ref 1.6–2.6)
MCH RBC QN AUTO: 28.1 PG (ref 24–34)
MCHC RBC AUTO-ENTMCNC: 32.9 G/DL (ref 31–37)
MCV RBC AUTO: 85.5 FL (ref 78–100)
METHADONE UR QL: NEGATIVE
MONOCYTES # BLD: 0.7 K/UL (ref 0.05–1.2)
MONOCYTES NFR BLD: 11 % (ref 3–10)
NEUTS SEG # BLD: 4 K/UL (ref 1.8–8)
NEUTS SEG NFR BLD: 63 % (ref 40–73)
NRBC # BLD: 0 K/UL (ref 0–0.01)
NRBC BLD-RTO: 0 PER 100 WBC
NT PRO BNP: ABNORMAL PG/ML (ref 0–900)
OPIATES UR QL: NEGATIVE
PCP UR QL: NEGATIVE
PLATELET # BLD AUTO: 247 K/UL (ref 135–420)
PMV BLD AUTO: 10 FL (ref 9.2–11.8)
POTASSIUM SERPL-SCNC: 3.7 MMOL/L (ref 3.5–5.5)
PROT SERPL-MCNC: 7.5 G/DL (ref 6.4–8.2)
RBC # BLD AUTO: 4.41 M/UL (ref 4.35–5.65)
SODIUM SERPL-SCNC: 142 MMOL/L (ref 136–145)
TROPONIN I SERPL HS-MCNC: 291 NG/L (ref 0–78)
TROPONIN I SERPL HS-MCNC: 338 NG/L (ref 0–78)
WBC # BLD AUTO: 6.3 K/UL (ref 4.6–13.2)

## 2023-06-05 PROCEDURE — 99285 EMERGENCY DEPT VISIT HI MDM: CPT

## 2023-06-05 PROCEDURE — 83735 ASSAY OF MAGNESIUM: CPT

## 2023-06-05 PROCEDURE — 80307 DRUG TEST PRSMV CHEM ANLYZR: CPT

## 2023-06-05 PROCEDURE — 83880 ASSAY OF NATRIURETIC PEPTIDE: CPT

## 2023-06-05 PROCEDURE — 6370000000 HC RX 637 (ALT 250 FOR IP)

## 2023-06-05 PROCEDURE — 71045 X-RAY EXAM CHEST 1 VIEW: CPT

## 2023-06-05 PROCEDURE — 93010 ELECTROCARDIOGRAM REPORT: CPT | Performed by: INTERNAL MEDICINE

## 2023-06-05 PROCEDURE — 2580000003 HC RX 258

## 2023-06-05 PROCEDURE — 6360000002 HC RX W HCPCS

## 2023-06-05 PROCEDURE — 1100000000 HC RM PRIVATE

## 2023-06-05 PROCEDURE — 85025 COMPLETE CBC W/AUTO DIFF WBC: CPT

## 2023-06-05 PROCEDURE — 6360000002 HC RX W HCPCS: Performed by: PHYSICIAN ASSISTANT

## 2023-06-05 PROCEDURE — 80053 COMPREHEN METABOLIC PANEL: CPT

## 2023-06-05 PROCEDURE — 94761 N-INVAS EAR/PLS OXIMETRY MLT: CPT

## 2023-06-05 PROCEDURE — 93005 ELECTROCARDIOGRAM TRACING: CPT | Performed by: EMERGENCY MEDICINE

## 2023-06-05 PROCEDURE — 96374 THER/PROPH/DIAG INJ IV PUSH: CPT

## 2023-06-05 PROCEDURE — 93005 ELECTROCARDIOGRAM TRACING: CPT | Performed by: PHYSICIAN ASSISTANT

## 2023-06-05 PROCEDURE — 6370000000 HC RX 637 (ALT 250 FOR IP): Performed by: PHYSICIAN ASSISTANT

## 2023-06-05 PROCEDURE — 84484 ASSAY OF TROPONIN QUANT: CPT

## 2023-06-05 RX ORDER — HEPARIN SODIUM 5000 [USP'U]/ML
5000 INJECTION, SOLUTION INTRAVENOUS; SUBCUTANEOUS EVERY 8 HOURS SCHEDULED
Status: DISCONTINUED | OUTPATIENT
Start: 2023-06-05 | End: 2023-06-07 | Stop reason: HOSPADM

## 2023-06-05 RX ORDER — SODIUM CHLORIDE 9 MG/ML
INJECTION, SOLUTION INTRAVENOUS PRN
Status: DISCONTINUED | OUTPATIENT
Start: 2023-06-05 | End: 2023-06-05

## 2023-06-05 RX ORDER — SPIRONOLACTONE 25 MG/1
25 TABLET ORAL DAILY
Status: DISCONTINUED | OUTPATIENT
Start: 2023-06-05 | End: 2023-06-07 | Stop reason: HOSPADM

## 2023-06-05 RX ORDER — ISOSORBIDE MONONITRATE 60 MG/1
60 TABLET, EXTENDED RELEASE ORAL DAILY
Status: DISCONTINUED | OUTPATIENT
Start: 2023-06-05 | End: 2023-06-07 | Stop reason: HOSPADM

## 2023-06-05 RX ORDER — CARVEDILOL 6.25 MG/1
6.25 TABLET ORAL ONCE
Status: COMPLETED | OUTPATIENT
Start: 2023-06-05 | End: 2023-06-05

## 2023-06-05 RX ORDER — ASPIRIN 81 MG/1
81 TABLET, CHEWABLE ORAL DAILY
Status: DISCONTINUED | OUTPATIENT
Start: 2023-06-05 | End: 2023-06-05

## 2023-06-05 RX ORDER — ASPIRIN 81 MG/1
81 TABLET, CHEWABLE ORAL DAILY
Status: DISCONTINUED | OUTPATIENT
Start: 2023-06-06 | End: 2023-06-07 | Stop reason: HOSPADM

## 2023-06-05 RX ORDER — SPIRONOLACTONE 25 MG/1
25 TABLET ORAL DAILY
Status: DISCONTINUED | OUTPATIENT
Start: 2023-06-05 | End: 2023-06-05

## 2023-06-05 RX ORDER — CARVEDILOL 6.25 MG/1
6.25 TABLET ORAL 2 TIMES DAILY WITH MEALS
Status: DISCONTINUED | OUTPATIENT
Start: 2023-06-05 | End: 2023-06-07 | Stop reason: HOSPADM

## 2023-06-05 RX ORDER — SODIUM CHLORIDE 0.9 % (FLUSH) 0.9 %
5-40 SYRINGE (ML) INJECTION EVERY 12 HOURS SCHEDULED
Status: DISCONTINUED | OUTPATIENT
Start: 2023-06-05 | End: 2023-06-07 | Stop reason: HOSPADM

## 2023-06-05 RX ORDER — ATORVASTATIN CALCIUM 40 MG/1
40 TABLET, FILM COATED ORAL NIGHTLY
Status: DISCONTINUED | OUTPATIENT
Start: 2023-06-05 | End: 2023-06-07 | Stop reason: HOSPADM

## 2023-06-05 RX ORDER — SPIRONOLACTONE 25 MG/1
25 TABLET ORAL ONCE
Status: COMPLETED | OUTPATIENT
Start: 2023-06-05 | End: 2023-06-05

## 2023-06-05 RX ORDER — FERROUS SULFATE 325(65) MG
325 TABLET ORAL 2 TIMES DAILY
Status: DISCONTINUED | OUTPATIENT
Start: 2023-06-05 | End: 2023-06-07 | Stop reason: HOSPADM

## 2023-06-05 RX ORDER — ASPIRIN 325 MG
325 TABLET ORAL
Status: COMPLETED | OUTPATIENT
Start: 2023-06-05 | End: 2023-06-05

## 2023-06-05 RX ORDER — CARVEDILOL 6.25 MG/1
6.25 TABLET ORAL
Status: DISCONTINUED | OUTPATIENT
Start: 2023-06-05 | End: 2023-06-05

## 2023-06-05 RX ORDER — ONDANSETRON 4 MG/1
4 TABLET, ORALLY DISINTEGRATING ORAL EVERY 8 HOURS PRN
Status: DISCONTINUED | OUTPATIENT
Start: 2023-06-05 | End: 2023-06-07 | Stop reason: HOSPADM

## 2023-06-05 RX ORDER — IPRATROPIUM BROMIDE AND ALBUTEROL SULFATE 2.5; .5 MG/3ML; MG/3ML
1 SOLUTION RESPIRATORY (INHALATION) EVERY 4 HOURS
Status: DISCONTINUED | OUTPATIENT
Start: 2023-06-05 | End: 2023-06-06

## 2023-06-05 RX ORDER — ISOSORBIDE MONONITRATE 30 MG/1
30 TABLET, EXTENDED RELEASE ORAL DAILY
Status: DISCONTINUED | OUTPATIENT
Start: 2023-06-05 | End: 2023-06-05

## 2023-06-05 RX ORDER — ISOSORBIDE MONONITRATE 30 MG/1
30 TABLET, EXTENDED RELEASE ORAL ONCE
Status: COMPLETED | OUTPATIENT
Start: 2023-06-05 | End: 2023-06-05

## 2023-06-05 RX ORDER — POLYETHYLENE GLYCOL 3350 17 G/17G
17 POWDER, FOR SOLUTION ORAL DAILY PRN
Status: DISCONTINUED | OUTPATIENT
Start: 2023-06-05 | End: 2023-06-07 | Stop reason: HOSPADM

## 2023-06-05 RX ORDER — SODIUM CHLORIDE 0.9 % (FLUSH) 0.9 %
5-40 SYRINGE (ML) INJECTION PRN
Status: DISCONTINUED | OUTPATIENT
Start: 2023-06-05 | End: 2023-06-07 | Stop reason: HOSPADM

## 2023-06-05 RX ORDER — ONDANSETRON 2 MG/ML
4 INJECTION INTRAMUSCULAR; INTRAVENOUS EVERY 6 HOURS PRN
Status: DISCONTINUED | OUTPATIENT
Start: 2023-06-05 | End: 2023-06-07 | Stop reason: HOSPADM

## 2023-06-05 RX ORDER — FUROSEMIDE 40 MG/1
40 TABLET ORAL DAILY
Status: DISCONTINUED | OUTPATIENT
Start: 2023-06-05 | End: 2023-06-07

## 2023-06-05 RX ORDER — FUROSEMIDE 10 MG/ML
40 INJECTION INTRAMUSCULAR; INTRAVENOUS
Status: COMPLETED | OUTPATIENT
Start: 2023-06-05 | End: 2023-06-05

## 2023-06-05 RX ADMIN — SODIUM CHLORIDE, PRESERVATIVE FREE 10 ML: 5 INJECTION INTRAVENOUS at 23:25

## 2023-06-05 RX ADMIN — ASPIRIN 325 MG: 325 TABLET, FILM COATED ORAL at 14:49

## 2023-06-05 RX ADMIN — HEPARIN SODIUM 5000 UNITS: 5000 INJECTION INTRAVENOUS; SUBCUTANEOUS at 23:20

## 2023-06-05 RX ADMIN — FERROUS SULFATE TAB 325 MG (65 MG ELEMENTAL FE) 325 MG: 325 (65 FE) TAB at 23:00

## 2023-06-05 RX ADMIN — IPRATROPIUM BROMIDE AND ALBUTEROL SULFATE 1 DOSE: .5; 2.5 SOLUTION RESPIRATORY (INHALATION) at 23:00

## 2023-06-05 RX ADMIN — CARVEDILOL 6.25 MG: 6.25 TABLET, FILM COATED ORAL at 18:22

## 2023-06-05 RX ADMIN — IPRATROPIUM BROMIDE AND ALBUTEROL SULFATE 1 DOSE: .5; 2.5 SOLUTION RESPIRATORY (INHALATION) at 18:23

## 2023-06-05 RX ADMIN — ISOSORBIDE MONONITRATE 30 MG: 30 TABLET, EXTENDED RELEASE ORAL at 18:22

## 2023-06-05 RX ADMIN — FUROSEMIDE 40 MG: 10 INJECTION, SOLUTION INTRAMUSCULAR; INTRAVENOUS at 15:32

## 2023-06-05 RX ADMIN — ATORVASTATIN CALCIUM 40 MG: 40 TABLET, FILM COATED ORAL at 23:00

## 2023-06-05 RX ADMIN — SPIRONOLACTONE 25 MG: 25 TABLET ORAL at 18:22

## 2023-06-05 ASSESSMENT — ENCOUNTER SYMPTOMS
SHORTNESS OF BREATH: 1
NAUSEA: 0
VOMITING: 0
ABDOMINAL PAIN: 0
DIARRHEA: 0

## 2023-06-05 NOTE — ED NOTES
Pending PFM Resident remington Mcginnis [facility] [place]  06/05/23 5981

## 2023-06-05 NOTE — ED TRIAGE NOTES
Patient presents to the ED with complaints of sob. Pt states that he has a history of CHF and he is having increased SOB. Patient also complains of severe generalized weakness. Pt states that his symptoms began 3 days ago.

## 2023-06-05 NOTE — ED PROVIDER NOTES
5-40 mL IntraVENous 2 times per day Chantel Snyder MD        sodium chloride flush 0.9 % injection 5-40 mL  5-40 mL IntraVENous PRN Chantel Snyder MD        ondansetron (ZOFRAN-ODT) disintegrating tablet 4 mg  4 mg Oral Q8H PRN Chantel Snyder MD        Or    ondansetron [facility]) injection 4 mg  4 mg IntraVENous Q6H PRN Chantel Snyder MD        polyethylene glycol [facility]) packet 17 g  17 g Oral Daily PRN Chantel Snyder MD        heparin (porcine) injection 5,000 Units  5,000 Units SubCUTAneous 3 times per day Chantel Snyder MD        ipratropium 0.5 mg-albuterol 2.5 mg (DUONEB) nebulizer solution 1 Dose  1 Dose Inhalation Q4H Chantel Snyder MD        [START ON 6/6/2023] aspirin chewable tablet 81 mg  81 mg Oral Daily Chantel Snyder MD         Current Outpatient Medications   Medication Sig Dispense Refill    spironolactone (ALDACTONE) 25 MG tablet Take 1 tablet by mouth daily 90 tablet 3    atorvastatin (LIPITOR) 40 MG tablet TAKE 1 TABLET BY MOUTH EVERYDAY AT BEDTIME      empagliflozin (JARDIANCE) 10 MG tablet Take 1 tablet by mouth daily 30 tablet 3    isosorbide mononitrate (IMDUR) 60 MG extended release tablet Take 1 tablet by mouth daily 30 tablet 3    carvedilol (COREG) 6.25 MG tablet Take 1 tablet by mouth 2 times daily (with meals) 60 tablet 3    sacubitril-valsartan (ENTRESTO) 49-51 MG per tablet Take 1 tablet by mouth 2 times daily 60 tablet 1    furosemide (LASIX) 40 MG tablet Take 1 tablet by mouth daily 60 tablet 3    ferrous sulfate (FE TABS) 325 (65 Fe) MG EC tablet Take 1 tablet by mouth 2 times daily 90 tablet 3    aspirin 81 MG chewable tablet Take 1 tablet by mouth daily         Past History     Past Medical History:  Past Medical History:   Diagnosis Date    Cardiomyopathy ([facility] [address]     Cocaine abuse ([facility] [address]     HTN (hypertension)     Hypertension     Stroke ([address]     Stroke risk [id number], 2009    pt stated he had a stroke in above dates       Past Surgical

## 2023-06-06 PROBLEM — I42.9 CARDIOMYOPATHY (HCC): Status: ACTIVE | Noted: 2023-06-06

## 2023-06-06 PROBLEM — I50.23 ACUTE ON CHRONIC SYSTOLIC CONGESTIVE HEART FAILURE (HCC): Status: ACTIVE | Noted: 2023-04-16

## 2023-06-06 LAB
ANION GAP SERPL CALC-SCNC: 8 MMOL/L (ref 3–18)
BUN SERPL-MCNC: 29 MG/DL (ref 7–18)
BUN/CREAT SERPL: 17 (ref 12–20)
CALCIUM SERPL-MCNC: 8.6 MG/DL (ref 8.5–10.1)
CHLORIDE SERPL-SCNC: 105 MMOL/L (ref 100–111)
CO2 SERPL-SCNC: 26 MMOL/L (ref 21–32)
CREAT SERPL-MCNC: 1.66 MG/DL (ref 0.6–1.3)
EKG ATRIAL RATE: 73 BPM
EKG DIAGNOSIS: NORMAL
EKG P AXIS: 65 DEGREES
EKG P-R INTERVAL: 196 MS
EKG Q-T INTERVAL: 424 MS
EKG QRS DURATION: 90 MS
EKG QTC CALCULATION (BAZETT): 467 MS
EKG R AXIS: 75 DEGREES
EKG T AXIS: 68 DEGREES
EKG VENTRICULAR RATE: 73 BPM
ERYTHROCYTE [DISTWIDTH] IN BLOOD BY AUTOMATED COUNT: 14.2 % (ref 11.6–14.5)
GLUCOSE SERPL-MCNC: 125 MG/DL (ref 74–99)
HCT VFR BLD AUTO: 39 % (ref 36–48)
HGB BLD-MCNC: 13 G/DL (ref 13–16)
MCH RBC QN AUTO: 28.1 PG (ref 24–34)
MCHC RBC AUTO-ENTMCNC: 33.3 G/DL (ref 31–37)
MCV RBC AUTO: 84.4 FL (ref 78–100)
NRBC # BLD: 0 K/UL (ref 0–0.01)
NRBC BLD-RTO: 0 PER 100 WBC
PLATELET # BLD AUTO: 275 K/UL (ref 135–420)
PMV BLD AUTO: 10.7 FL (ref 9.2–11.8)
POTASSIUM SERPL-SCNC: 3.7 MMOL/L (ref 3.5–5.5)
RBC # BLD AUTO: 4.62 M/UL (ref 4.35–5.65)
SODIUM SERPL-SCNC: 139 MMOL/L (ref 136–145)
WBC # BLD AUTO: 6.6 K/UL (ref 4.6–13.2)

## 2023-06-06 PROCEDURE — 6360000002 HC RX W HCPCS: Performed by: PHYSICIAN ASSISTANT

## 2023-06-06 PROCEDURE — 6370000000 HC RX 637 (ALT 250 FOR IP)

## 2023-06-06 PROCEDURE — 99222 1ST HOSP IP/OBS MODERATE 55: CPT | Performed by: INTERNAL MEDICINE

## 2023-06-06 PROCEDURE — 93010 ELECTROCARDIOGRAM REPORT: CPT | Performed by: INTERNAL MEDICINE

## 2023-06-06 PROCEDURE — 2580000003 HC RX 258

## 2023-06-06 PROCEDURE — 85027 COMPLETE CBC AUTOMATED: CPT

## 2023-06-06 PROCEDURE — 6360000002 HC RX W HCPCS

## 2023-06-06 PROCEDURE — 94761 N-INVAS EAR/PLS OXIMETRY MLT: CPT

## 2023-06-06 PROCEDURE — 1100000003 HC PRIVATE W/ TELEMETRY

## 2023-06-06 PROCEDURE — 80048 BASIC METABOLIC PNL TOTAL CA: CPT

## 2023-06-06 RX ORDER — ALBUTEROL SULFATE 2.5 MG/3ML
SOLUTION RESPIRATORY (INHALATION)
Status: DISPENSED
Start: 2023-06-06 | End: 2023-06-06

## 2023-06-06 RX ORDER — HYDRALAZINE HYDROCHLORIDE 20 MG/ML
10 INJECTION INTRAMUSCULAR; INTRAVENOUS EVERY 6 HOURS PRN
Status: DISCONTINUED | OUTPATIENT
Start: 2023-06-06 | End: 2023-06-07 | Stop reason: HOSPADM

## 2023-06-06 RX ORDER — AMLODIPINE BESYLATE 5 MG/1
TABLET ORAL
Status: COMPLETED
Start: 2023-06-06 | End: 2023-06-06

## 2023-06-06 RX ORDER — FUROSEMIDE 10 MG/ML
20 INJECTION INTRAMUSCULAR; INTRAVENOUS 2 TIMES DAILY
Status: DISCONTINUED | OUTPATIENT
Start: 2023-06-06 | End: 2023-06-06

## 2023-06-06 RX ORDER — FUROSEMIDE 10 MG/ML
40 INJECTION INTRAMUSCULAR; INTRAVENOUS 2 TIMES DAILY
Status: DISCONTINUED | OUTPATIENT
Start: 2023-06-06 | End: 2023-06-07

## 2023-06-06 RX ORDER — IPRATROPIUM BROMIDE AND ALBUTEROL SULFATE 2.5; .5 MG/3ML; MG/3ML
1 SOLUTION RESPIRATORY (INHALATION) EVERY 4 HOURS PRN
Status: DISCONTINUED | OUTPATIENT
Start: 2023-06-06 | End: 2023-06-07 | Stop reason: HOSPADM

## 2023-06-06 RX ORDER — AMLODIPINE BESYLATE 5 MG/1
5 TABLET ORAL DAILY
Status: DISCONTINUED | OUTPATIENT
Start: 2023-06-06 | End: 2023-06-07

## 2023-06-06 RX ADMIN — SODIUM CHLORIDE, PRESERVATIVE FREE 10 ML: 5 INJECTION INTRAVENOUS at 22:01

## 2023-06-06 RX ADMIN — SACUBITRIL AND VALSARTAN 1 TABLET: 49; 51 TABLET, FILM COATED ORAL at 22:06

## 2023-06-06 RX ADMIN — IPRATROPIUM BROMIDE AND ALBUTEROL SULFATE 1 DOSE: .5; 2.5 SOLUTION RESPIRATORY (INHALATION) at 06:20

## 2023-06-06 RX ADMIN — ATORVASTATIN CALCIUM 40 MG: 40 TABLET, FILM COATED ORAL at 22:01

## 2023-06-06 RX ADMIN — CARVEDILOL 6.25 MG: 6.25 TABLET, FILM COATED ORAL at 07:39

## 2023-06-06 RX ADMIN — FUROSEMIDE 40 MG: 10 INJECTION, SOLUTION INTRAMUSCULAR; INTRAVENOUS at 17:06

## 2023-06-06 RX ADMIN — ASPIRIN 81 MG CHEWABLE TABLET 81 MG: 81 TABLET CHEWABLE at 08:28

## 2023-06-06 RX ADMIN — IPRATROPIUM BROMIDE AND ALBUTEROL SULFATE 1 DOSE: .5; 2.5 SOLUTION RESPIRATORY (INHALATION) at 08:32

## 2023-06-06 RX ADMIN — FERROUS SULFATE TAB 325 MG (65 MG ELEMENTAL FE) 325 MG: 325 (65 FE) TAB at 22:01

## 2023-06-06 RX ADMIN — SODIUM CHLORIDE, PRESERVATIVE FREE 10 ML: 5 INJECTION INTRAVENOUS at 08:30

## 2023-06-06 RX ADMIN — AMLODIPINE BESYLATE 5 MG: 5 TABLET ORAL at 13:00

## 2023-06-06 RX ADMIN — HEPARIN SODIUM 5000 UNITS: 5000 INJECTION INTRAVENOUS; SUBCUTANEOUS at 16:08

## 2023-06-06 RX ADMIN — HEPARIN SODIUM 5000 UNITS: 5000 INJECTION INTRAVENOUS; SUBCUTANEOUS at 06:27

## 2023-06-06 RX ADMIN — AMLODIPINE BESYLATE 5 MG: 2.5 TABLET ORAL at 13:00

## 2023-06-06 RX ADMIN — HEPARIN SODIUM 5000 UNITS: 5000 INJECTION INTRAVENOUS; SUBCUTANEOUS at 22:01

## 2023-06-06 RX ADMIN — CARVEDILOL 6.25 MG: 6.25 TABLET, FILM COATED ORAL at 17:06

## 2023-06-06 RX ADMIN — FERROUS SULFATE TAB 325 MG (65 MG ELEMENTAL FE) 325 MG: 325 (65 FE) TAB at 08:28

## 2023-06-06 RX ADMIN — ISOSORBIDE MONONITRATE 60 MG: 60 TABLET, EXTENDED RELEASE ORAL at 08:28

## 2023-06-06 NOTE — ED NOTES
Pt resting at this time. Pt updated with plan of care. VSS at this time. Will continue to monitor to monitor.  Will continue to monitor      Lulu Wiseman RN  06/06/23 1215 [facility]  06/06/23 3972

## 2023-06-06 NOTE — CONSULTS
abuse and non compliance. -Further recommendations pending hospital course. History of Present Illness: This is a 60 y.o. male admitted for Dyspnea [R06.00]. Patient complains of: JEREMY Becerril is a 60 y.o. male, pmhx as stated above, who are seeing for a/c HFrEF. Patient reports worsening fatigued and SOB for 3 days PTA. SOB worsened with activity and lying flat, improves with rest and sitting up right. He admits  he has been non compliant with his fluid intake and low sodium diet. His meals have consisted of sausages and friend chicken from miscellaneous fast food facilities. Denies CP, LE edema, weight gain, near syncope or syncope, palpitations, diaphoresis, N/V/D. Cardiac risk factors: advanced age (older than 54 for men, 72 for women), dyslipidemia, hypertension, male gender, smoking/ tobacco exposure, and CMY, cocaine abuse  Review of Symptoms:  Except as stated above include:  Constitutional:  as per HPI   Respiratory:  as per HPI   Cardiovascular:  negative  Gastrointestinal: negative  Genitourinary:  negative  Musculoskeletal:  Negative  Neurological:  Negative  Dermatological:  Negative  Endocrinological: Negative  Psychological:  Negative    Pertinent items are noted in HPI. Past Medical History:     Past Medical History:   Diagnosis Date    Cardiomyopathy ([facility] [address]     Cocaine abuse ([facility] [address]     HTN (hypertension)     Hypertension     Stroke [facility])     Stroke risk [id number], 2009    pt stated he had a stroke in above dates         Social History:     Social History     Socioeconomic History    Marital status:      Spouse name: None    Number of children: None    Years of education: None    Highest education level: None   Tobacco Use    Smoking status: Some Days     Packs/day: 1.00     Types: Cigarettes    Smokeless tobacco: Never   Substance and Sexual Activity    Alcohol use:  Yes     Alcohol/week: 2.0 standard drinks    Drug use: No   Social History Narrative         **

## 2023-06-06 NOTE — ED NOTES
Report received from Favio. Pt resting at this time. VSS currently.  Will continue to monitor      Cyndee Tripahti RN  06/06/23 9684

## 2023-06-06 NOTE — ED NOTES
MD paged about pts BP.  Will medicated per [facility] Mariano RN  06/06/23 5412

## 2023-06-07 ENCOUNTER — APPOINTMENT (OUTPATIENT)
Facility: HOSPITAL | Age: 63
DRG: 194 | End: 2023-06-07
Payer: COMMERCIAL

## 2023-06-07 VITALS
BODY MASS INDEX: 23.7 KG/M2 | RESPIRATION RATE: 18 BRPM | HEART RATE: 66 BPM | SYSTOLIC BLOOD PRESSURE: 142 MMHG | DIASTOLIC BLOOD PRESSURE: 95 MMHG | WEIGHT: 151 LBS | OXYGEN SATURATION: 92 % | HEIGHT: 67 IN | TEMPERATURE: 97.5 F

## 2023-06-07 PROBLEM — R06.00 DYSPNEA: Status: RESOLVED | Noted: 2023-06-05 | Resolved: 2023-06-07

## 2023-06-07 PROBLEM — I50.23 ACUTE ON CHRONIC SYSTOLIC CONGESTIVE HEART FAILURE (HCC): Status: RESOLVED | Noted: 2023-04-16 | Resolved: 2023-06-07

## 2023-06-07 LAB
ANION GAP SERPL CALC-SCNC: 8 MMOL/L (ref 3–18)
BUN SERPL-MCNC: 28 MG/DL (ref 7–18)
BUN/CREAT SERPL: 18 (ref 12–20)
CALCIUM SERPL-MCNC: 8.5 MG/DL (ref 8.5–10.1)
CHLORIDE SERPL-SCNC: 103 MMOL/L (ref 100–111)
CO2 SERPL-SCNC: 26 MMOL/L (ref 21–32)
CREAT SERPL-MCNC: 1.58 MG/DL (ref 0.6–1.3)
ECHO AO ROOT DIAM: 3.4 CM
ECHO AO ROOT INDEX: 1.9 CM/M2
ECHO AV AREA PEAK VELOCITY: 3.3 CM2
ECHO AV AREA VTI: 3 CM2
ECHO AV AREA/BSA PEAK VELOCITY: 1.8 CM2/M2
ECHO AV AREA/BSA VTI: 1.7 CM2/M2
ECHO AV MEAN GRADIENT: 2 MMHG
ECHO AV MEAN VELOCITY: 0.6 M/S
ECHO AV PEAK GRADIENT: 3 MMHG
ECHO AV PEAK VELOCITY: 0.8 M/S
ECHO AV VELOCITY RATIO: 0.75
ECHO AV VTI: 13 CM
ECHO BSA: 1.8 M2
ECHO LA VOL 2C: 84 ML (ref 18–58)
ECHO LA VOL 2C: 86 ML (ref 18–58)
ECHO LA VOL 4C: 35 ML (ref 18–58)
ECHO LA VOL 4C: 37 ML (ref 18–58)
ECHO LA VOL BP: 54 ML (ref 18–58)
ECHO LA VOL BP: 54 ML (ref 18–58)
ECHO LA VOLUME AREA LENGTH: 57 ML
ECHO LA VOLUME INDEX AREA LENGTH: 32 ML/M2 (ref 16–34)
ECHO LV E' LATERAL VELOCITY: 2 CM/S
ECHO LV E' SEPTAL VELOCITY: 3 CM/S
ECHO LV FRACTIONAL SHORTENING: -24 % (ref 28–44)
ECHO LV INTERNAL DIMENSION DIASTOLE INDEX: 2.07 CM/M2
ECHO LV INTERNAL DIMENSION DIASTOLIC: 3.7 CM (ref 4.2–5.9)
ECHO LV INTERNAL DIMENSION SYSTOLIC INDEX: 2.57 CM/M2
ECHO LV INTERNAL DIMENSION SYSTOLIC: 4.6 CM
ECHO LV IVSD: 1.6 CM (ref 0.6–1)
ECHO LV MASS 2D: 269 G (ref 88–224)
ECHO LV MASS INDEX 2D: 150.3 G/M2 (ref 49–115)
ECHO LV POSTERIOR WALL DIASTOLIC: 1.9 CM (ref 0.6–1)
ECHO LV RELATIVE WALL THICKNESS RATIO: 1.03
ECHO LVOT AREA: 4.5 CM2
ECHO LVOT AV VTI INDEX: 0.68
ECHO LVOT DIAM: 2.4 CM
ECHO LVOT MEAN GRADIENT: 1 MMHG
ECHO LVOT PEAK GRADIENT: 1 MMHG
ECHO LVOT PEAK VELOCITY: 0.6 M/S
ECHO LVOT STROKE VOLUME INDEX: 22.2 ML/M2
ECHO LVOT SV: 39.8 ML
ECHO LVOT VTI: 8.8 CM
ECHO MV A VELOCITY: 0.55 M/S
ECHO MV E DECELERATION TIME (DT): 221.9 MS
ECHO MV E VELOCITY: 0.42 M/S
ECHO MV E/A RATIO: 0.76
ECHO MV E/E' LATERAL: 21
ECHO MV E/E' RATIO (AVERAGED): 17.5
ECHO MV E/E' SEPTAL: 14
ECHO PV MAX VELOCITY: 0.8 M/S
ECHO PV PEAK GRADIENT: 3 MMHG
ECHO RV TAPSE: 1.7 CM (ref 1.7–?)
ERYTHROCYTE [DISTWIDTH] IN BLOOD BY AUTOMATED COUNT: 14.1 % (ref 11.6–14.5)
GLUCOSE SERPL-MCNC: 187 MG/DL (ref 74–99)
HCT VFR BLD AUTO: 39.5 % (ref 36–48)
HGB BLD-MCNC: 13.2 G/DL (ref 13–16)
MAGNESIUM SERPL-MCNC: 1.5 MG/DL (ref 1.6–2.6)
MAGNESIUM SERPL-MCNC: 2.1 MG/DL (ref 1.6–2.6)
MCH RBC QN AUTO: 27.8 PG (ref 24–34)
MCHC RBC AUTO-ENTMCNC: 33.4 G/DL (ref 31–37)
MCV RBC AUTO: 83.2 FL (ref 78–100)
NRBC # BLD: 0 K/UL (ref 0–0.01)
NRBC BLD-RTO: 0 PER 100 WBC
PLATELET # BLD AUTO: 270 K/UL (ref 135–420)
PMV BLD AUTO: 10.9 FL (ref 9.2–11.8)
POTASSIUM SERPL-SCNC: 3.3 MMOL/L (ref 3.5–5.5)
RBC # BLD AUTO: 4.75 M/UL (ref 4.35–5.65)
SODIUM SERPL-SCNC: 137 MMOL/L (ref 136–145)
WBC # BLD AUTO: 6.5 K/UL (ref 4.6–13.2)

## 2023-06-07 PROCEDURE — 93306 TTE W/DOPPLER COMPLETE: CPT | Performed by: INTERNAL MEDICINE

## 2023-06-07 PROCEDURE — 93306 TTE W/DOPPLER COMPLETE: CPT

## 2023-06-07 PROCEDURE — 94761 N-INVAS EAR/PLS OXIMETRY MLT: CPT

## 2023-06-07 PROCEDURE — 83735 ASSAY OF MAGNESIUM: CPT

## 2023-06-07 PROCEDURE — 6370000000 HC RX 637 (ALT 250 FOR IP)

## 2023-06-07 PROCEDURE — 80048 BASIC METABOLIC PNL TOTAL CA: CPT

## 2023-06-07 PROCEDURE — 6370000000 HC RX 637 (ALT 250 FOR IP): Performed by: PHYSICIAN ASSISTANT

## 2023-06-07 PROCEDURE — 99232 SBSQ HOSP IP/OBS MODERATE 35: CPT | Performed by: INTERNAL MEDICINE

## 2023-06-07 PROCEDURE — 6360000002 HC RX W HCPCS

## 2023-06-07 PROCEDURE — 85027 COMPLETE CBC AUTOMATED: CPT

## 2023-06-07 PROCEDURE — 6360000002 HC RX W HCPCS: Performed by: PHYSICIAN ASSISTANT

## 2023-06-07 PROCEDURE — 2580000003 HC RX 258

## 2023-06-07 PROCEDURE — 36415 COLL VENOUS BLD VENIPUNCTURE: CPT

## 2023-06-07 RX ORDER — MAGNESIUM SULFATE IN WATER 40 MG/ML
2000 INJECTION, SOLUTION INTRAVENOUS ONCE
Status: COMPLETED | OUTPATIENT
Start: 2023-06-07 | End: 2023-06-07

## 2023-06-07 RX ORDER — FUROSEMIDE 40 MG/1
40 TABLET ORAL 2 TIMES DAILY
Qty: 60 TABLET | Refills: 3 | Status: SHIPPED | OUTPATIENT
Start: 2023-06-07 | End: 2023-06-07 | Stop reason: SDUPTHER

## 2023-06-07 RX ORDER — POTASSIUM CHLORIDE 20 MEQ/1
40 TABLET, EXTENDED RELEASE ORAL
Qty: 60 TABLET | Refills: 3 | Status: SHIPPED | OUTPATIENT
Start: 2023-06-08 | End: 2023-06-07 | Stop reason: SDUPTHER

## 2023-06-07 RX ORDER — POTASSIUM CHLORIDE 20 MEQ/1
40 TABLET, EXTENDED RELEASE ORAL
Status: DISCONTINUED | OUTPATIENT
Start: 2023-06-07 | End: 2023-06-07 | Stop reason: HOSPADM

## 2023-06-07 RX ORDER — FUROSEMIDE 40 MG/1
40 TABLET ORAL 2 TIMES DAILY
Qty: 60 TABLET | Refills: 3 | Status: SHIPPED | OUTPATIENT
Start: 2023-06-07

## 2023-06-07 RX ORDER — LANOLIN ALCOHOL/MO/W.PET/CERES
400 CREAM (GRAM) TOPICAL DAILY
Status: DISCONTINUED | OUTPATIENT
Start: 2023-06-07 | End: 2023-06-07

## 2023-06-07 RX ORDER — AMLODIPINE BESYLATE 2.5 MG/1
2.5 TABLET ORAL DAILY
Qty: 30 TABLET | Refills: 3 | Status: SHIPPED | OUTPATIENT
Start: 2023-06-08 | End: 2023-06-07 | Stop reason: SDUPTHER

## 2023-06-07 RX ORDER — FUROSEMIDE 10 MG/ML
40 INJECTION INTRAMUSCULAR; INTRAVENOUS 2 TIMES DAILY
Status: DISCONTINUED | OUTPATIENT
Start: 2023-06-07 | End: 2023-06-07

## 2023-06-07 RX ORDER — AMLODIPINE BESYLATE 2.5 MG/1
2.5 TABLET ORAL DAILY
Qty: 30 TABLET | Refills: 3 | Status: SHIPPED | OUTPATIENT
Start: 2023-06-08

## 2023-06-07 RX ORDER — FUROSEMIDE 40 MG/1
40 TABLET ORAL DAILY
Status: DISCONTINUED | OUTPATIENT
Start: 2023-06-08 | End: 2023-06-07

## 2023-06-07 RX ORDER — AMLODIPINE BESYLATE 2.5 MG/1
2.5 TABLET ORAL DAILY
Status: DISCONTINUED | OUTPATIENT
Start: 2023-06-08 | End: 2023-06-07 | Stop reason: HOSPADM

## 2023-06-07 RX ORDER — FUROSEMIDE 40 MG/1
40 TABLET ORAL DAILY
Status: DISCONTINUED | OUTPATIENT
Start: 2023-06-07 | End: 2023-06-07 | Stop reason: HOSPADM

## 2023-06-07 RX ORDER — POTASSIUM CHLORIDE 20 MEQ/1
40 TABLET, EXTENDED RELEASE ORAL
Qty: 60 TABLET | Refills: 3 | Status: SHIPPED | OUTPATIENT
Start: 2023-06-08

## 2023-06-07 RX ADMIN — SODIUM CHLORIDE, PRESERVATIVE FREE 10 ML: 5 INJECTION INTRAVENOUS at 08:31

## 2023-06-07 RX ADMIN — POTASSIUM CHLORIDE 40 MEQ: 1500 TABLET, EXTENDED RELEASE ORAL at 08:29

## 2023-06-07 RX ADMIN — ASPIRIN 81 MG CHEWABLE TABLET 81 MG: 81 TABLET CHEWABLE at 08:28

## 2023-06-07 RX ADMIN — SACUBITRIL AND VALSARTAN 1 TABLET: 49; 51 TABLET, FILM COATED ORAL at 12:02

## 2023-06-07 RX ADMIN — AMLODIPINE BESYLATE 5 MG: 2.5 TABLET ORAL at 08:29

## 2023-06-07 RX ADMIN — ISOSORBIDE MONONITRATE 60 MG: 60 TABLET, EXTENDED RELEASE ORAL at 08:29

## 2023-06-07 RX ADMIN — Medication 400 MG: at 08:29

## 2023-06-07 RX ADMIN — FUROSEMIDE 40 MG: 10 INJECTION, SOLUTION INTRAMUSCULAR; INTRAVENOUS at 09:22

## 2023-06-07 RX ADMIN — CARVEDILOL 6.25 MG: 6.25 TABLET, FILM COATED ORAL at 07:23

## 2023-06-07 RX ADMIN — HEPARIN SODIUM 5000 UNITS: 5000 INJECTION INTRAVENOUS; SUBCUTANEOUS at 14:22

## 2023-06-07 RX ADMIN — EMPAGLIFLOZIN 10 MG: 10 TABLET, FILM COATED ORAL at 08:29

## 2023-06-07 RX ADMIN — MAGNESIUM SULFATE 2000 MG: 2 INJECTION INTRAVENOUS at 09:40

## 2023-06-07 RX ADMIN — SPIRONOLACTONE 25 MG: 25 TABLET ORAL at 09:22

## 2023-06-07 RX ADMIN — FERROUS SULFATE TAB 325 MG (65 MG ELEMENTAL FE) 325 MG: 325 (65 FE) TAB at 08:28

## 2023-06-07 RX ADMIN — HEPARIN SODIUM 5000 UNITS: 5000 INJECTION INTRAVENOUS; SUBCUTANEOUS at 06:54

## 2023-06-07 NOTE — PLAN OF CARE
Problem: Discharge Planning  Goal: Discharge to home or other facility with appropriate resources  Outcome: Completed     Problem: Safety - Adult  Goal: Free from fall injury  Outcome: Completed     Problem: Chronic Conditions and Co-morbidities  Goal: Patient's chronic conditions and co-morbidity symptoms are monitored and maintained or improved  Outcome: Completed     Problem: Skin/Tissue Integrity  Goal: Absence of new skin breakdown  Description: 1. Monitor for areas of redness and/or skin breakdown  2. Assess vascular access sites hourly  3. Every 4-6 hours minimum:  Change oxygen saturation probe site  4. Every 4-6 hours:  If on nasal continuous positive airway pressure, respiratory therapy assess nares and determine need for appliance change or resting period.   Outcome: Completed     Problem: ABCDS Injury Assessment  Goal: Absence of physical injury  Outcome: Completed

## 2023-06-07 NOTE — CARE COORDINATION
06/07/23 1135   Service Assessment   Patient Orientation Alert and Oriented;Person;Place;Situation;Self   Cognition Alert   History Provided By Medical Record   Primary Caregiver Self   Support Systems Family Members   PCP Verified by CM Yes   Prior Functional Level Independent in ADLs/IADLs   Current Functional Level Independent in ADLs/IADLs   Can patient return to prior living arrangement Yes   Ability to make needs known: Good   Family able to assist with home care needs: Yes   Social/Functional History   Lives With Family   Type of [address] One level   Home Access Stairs to enter with rails   Entrance Stairs - Number of Steps 3 Steps to Enter the home. Entrance Stairs - Rails Both   Bathroom Shower/Tub Tub/Shower unit   Bathroom Toilet Standard   Bathroom Equipment Grab bars in shower   [address]   (Patient was not using any DME in the home.)   Receives Help From [address] [place] [address] Guntergillian Chinchilla   Transfer Assistance Independent   Discharge Planning   Type of Residence House   Living Arrangements Family Members   Current Services Prior To Admission None   Potential Assistance Needed N/A   DME Ordered?  No   Potential Assistance Purchasing Medications No   Type of Home Care Services None   Patient expects to be discharged to: [place]  (with [facility] Discharge   Transition of Care Consult (CM Consult) N/A   Services At/After Discharge None   Mode of Transport at Discharge Self  (Patient's family to transport patient home at time of discharge.)   Confirm Follow Up Transport Family

## 2023-06-07 NOTE — DISCHARGE INSTRUCTIONS
times in case of emergency situations. These are general instructions for a healthy lifestyle:    No smoking/ No tobacco products/ Avoid exposure to second hand smoke  Surgeon General's Warning:  Quitting smoking now greatly reduces serious risk to your health. Obesity, smoking, and sedentary lifestyle greatly increases your risk for illness    A healthy diet, regular physical exercise & weight monitoring are important for maintaining a healthy lifestyle    You may be retaining fluid if you have a history of heart failure or if you experience any of the following symptoms:  Weight gain of 3 pounds or more overnight or 5 pounds in a week, increased swelling in our hands or feet or shortness of breath while lying flat in bed. Please call your doctor as soon as you notice any of these symptoms; do not wait until your next office visit. The discharge information has been reviewed with the patient. The patient verbalized understanding. Discharge medications reviewed with the patient and appropriate educational materials and side effects teaching were provided.   ___________________________________________________________________________________________________________________________________

## 2023-06-07 NOTE — PROGRESS NOTES
completed the initial Spiritual Assessment of the patient, and offered Pastoral Care support to the patient in bed 6 of the emergency room where the patient will be admitted to the hospital from. , There is no advance directive on file for the patient. Patient does not have any Mormonism/cultural needs that will affect patients preferences in health care. Chaplains will continue to follow and will provide pastoral care on an as needed/requested basis.     [facility]  Spiritual Care Department  [phone]
Discharge instruction given to the patient including the prescribed medication on where to pick it up and how to take it. Patient verbalized understanding.
Discharge patient in stable condition. Patient taken down to lobby by the CNA via wheelchair.
Entresto, Aldactone, Coreg, Jardiance, Imdur. Also on amlodipine for hypertension and coronary vasospasm in setting of active cocaine abuse. -ASA, statin.   -Continue to encourage lifestyle modifications. CHF diet education reinforced. Importance of compliance with medications d/w patient at length. -Echo pending.   -Not a candidate for AICD implantation d/t active cocaine abuse/non compliance. Rhythm has been stable on tele, no significant arrhythmias noted. -Recommend ambulating today and assessing dyspnea. Anticipate discharge home this afternoon. Plan for outpatient follow up with Dr. Roxy Luis in 2-3 weeks. Our office has been notified to contact patient to arrange his follow up visit. Subjective:     Denies CP or SOB. Feels back to baseline. Able to lie flat this morning while conversing without SOB.      Objective:      Patient Vitals for the past 8 hrs:   Temp Pulse Resp BP SpO2   06/07/23 0715 97.5 °F (36.4 °C) 71 20 (!) 167/100 98 %   06/07/23 0430 97.8 °F (36.6 °C) 66 14 (!) 142/95 98 %         Patient Vitals for the past 96 hrs:   Weight   06/05/23 1231 151 lb (68.5 kg)       TELE: SR               Current Facility-Administered Medications   Medication Dose Route Frequency    potassium chloride (KLOR-CON M) extended release tablet 40 mEq  40 mEq Oral Daily with breakfast    magnesium oxide (MAG-OX) tablet 400 mg  400 mg Oral Daily    hydrALAZINE (APRESOLINE) injection 10 mg  10 mg IntraVENous Q6H PRN    amLODIPine (NORVASC) tablet 5 mg  5 mg Oral Daily    ipratropium 0.5 mg-albuterol 2.5 mg (DUONEB) nebulizer solution 1 Dose  1 Dose Inhalation Q4H PRN    furosemide (LASIX) injection 40 mg  40 mg IntraVENous BID    atorvastatin (LIPITOR) tablet 40 mg  40 mg Oral Nightly    carvedilol (COREG) tablet 6.25 mg  6.25 mg Oral BID WC    ferrous sulfate (IRON 325) tablet 325 mg  325 mg Oral BID    [Held by provider] furosemide (LASIX) tablet 40 mg  40 mg Oral Daily    isosorbide mononitrate (IMDUR)
Wt:?Routine / Daily? Diet:?Regular? Bowel Regimen:? Glycolax PRN  DVT/AC:?subQ heparin? Mobility: per protocol   Disposition:? Admit to medical unit? Anticipated LOS:?1-3 days? Point of Contact: Matilda Barroso (sister) [phone]        SUBJECTIVE:   Events of the last 24 hours:  No acute events overnight. Patient seen at beside. No acute complaints, feeling much better. Able to walk to and from the bathroom without issue or SOB.      ROS (positive findings are in BOLD; negative findings are in regular font)  Constitutional: fevers, chills, appetite changes, weight changes, fatigue  HEENT: changes in vision, changes in hearing, sore throat, dysphagia  Cardiovascular: chest pain, palpitations, PND, orthopnea, edema  Pulmonary: SOB, cough, sputum production, wheezing, chest tightness  Gastrointestinal: abdominal pain, nausea/vomiting, diarrhea, constipation, melena, hematochezia  Genitourinary: dysuria, hesitation, dribbling, urgency, hematuria  Musculoskeletal: arthralgias, myalgias  Skin: rash, itching  Neurological: sensory changes, motor changes, headache  Psychiatric: mood changes  Endocrine: heat/cold intolerance  Heme: easy bruising/easy bleeding, LAD    CURRENT INPATIENT MEDICATIONS:  Current Facility-Administered Medications   Medication Dose Route Frequency Provider Last Rate Last Admin    potassium chloride (KLOR-CON M) extended release tablet 40 mEq  40 mEq Oral Daily with breakfast Fany Olson DO        magnesium oxide (MAG-OX) tablet 400 mg  400 mg Oral Daily Fany Olson DO        hydrALAZINE (APRESOLINE) injection 10 mg  10 mg IntraVENous Q6H PRN Liz Mazariegos PA-C        amLODIPine (NORVASC) tablet 5 mg  5 mg Oral Daily Liz Mazariegos PA-C   5 mg at 06/06/23 1300    ipratropium 0.5 mg-albuterol 2.5 mg (DUONEB) nebulizer solution 1 Dose  1 Dose Inhalation Q4H PRN Fany Olson DO        furosemide (LASIX) injection 40 mg  40 mg IntraVENous BID Liz Mazariegos PA-C   40 mg at 06/06/23
rebound, no guarding, no hepatosplenomegaly   MSK: no clubbing, no edema; TTP right sided- chest wall  Skin: warm, dry, intact, no rash  Neuro: CN II-XII grossly intact, no focal deficits appreciated   Psych: appropriate, alert, oriented x3    LABWORK (LAST 24 HOURS)  Recent Results (from the past 24 hour(s))   EKG 12 Lead    Collection Time: 06/05/23 12:42 PM   Result Value Ref Range    Ventricular Rate 74 BPM    Atrial Rate 74 BPM    P-R Interval 180 ms    QRS Duration 92 ms    Q-T Interval 424 ms    QTc Calculation (Bazett) 470 ms    P Axis 77 degrees    R Axis -19 degrees    T Axis 108 degrees    Diagnosis       Sinus rhythm with frequent premature ventricular complexes  Minimal voltage criteria for LVH, may be normal variant ( Darell product )  ST & T wave abnormality, consider lateral ischemia  Prolonged QT  Abnormal ECG  When compared with ECG of 01-MAY-2023 21:51,  premature ventricular complexes are now present  Confirmed by Анна Moses MD, Yobani ([id number]) on 6/5/2023 1:13:09 PM     Brain Natriuretic Peptide    Collection Time: 06/05/23 12:48 PM   Result Value Ref Range    NT Pro-BNP 49,299 (H) 0 - 900 PG/ML   Troponin    Collection Time: 06/05/23 12:48 PM   Result Value Ref Range    Troponin, High Sensitivity 338 (HH) 0 - 78 ng/L   Comprehensive Metabolic Panel    Collection Time: 06/05/23 12:48 PM   Result Value Ref Range    Sodium 142 136 - 145 mmol/L    Potassium 3.7 3.5 - 5.5 mmol/L    Chloride 108 100 - 111 mmol/L    CO2 27 21 - 32 mmol/L    Anion Gap 7 3.0 - 18 mmol/L    Glucose 191 (H) 74 - 99 mg/dL    BUN 28 (H) 7.0 - 18 MG/DL    Creatinine 2.15 (H) 0.6 - 1.3 MG/DL    Bun/Cre Ratio 13 12 - 20      Est, Glom Filt Rate 34 (L) >60 ml/min/1.73m2    Calcium 8.9 8.5 - 10.1 MG/DL    Total Bilirubin 1.0 0.2 - 1.0 MG/DL     (H) 16 - 61 U/L    AST 98 (H) 10 - 38 U/L    Alk Phosphatase 130 (H) 45 - 117 U/L    Total Protein 7.5 6.4 - 8.2 g/dL    Albumin 3.4 3.4 - 5.0 g/dL    Globulin 4.1 (H) 2.0 - 4.0 g/dL

## 2023-06-07 NOTE — CARE COORDINATION
D/C order noted for today. Orders reviewed. No needs identified at this time. Patient's family to transport patient home today at time of discharge. CM remains available if needed.            Dana Severs, [phone]

## 2023-06-20 NOTE — PLAN OF CARE
June 20, 2023       Santiago Reagan MD  [address] [facility] [address]  Via Fax: [phone]      Patient: Lacie Buckner   YOB: 1948   Date of Visit: 6/20/2023       Dear Dr. Reagan:    Thank you for referring Lacie Buckner to me for evaluation. Below are my notes for this visit with her.    If you have questions, please do not hesitate to call me. I look forward to following your patient along with you.      Sincerely,        ALBAN Parra        CC: No Recipients  Marielena San APNP  6/20/2023  2:06 PM  Signed  Chief Complaint/Reason for Visit:   Chief Complaint   Patient presents with   • Office Visit   • Follow-up     2 wk f/u        HISTORY OF PRESENT ILLNESS: Lacie Buckner is a 74 year old female known to Dr.S Franklin with a past medical history of hypertension, hyperlipidemia, tachycardia, and mild aortic stenosis who comes in for cardiac preoperative risk stratification for knee surgery and reevaluation of blood pressure after medication adjustments..     She is able to achieve 4 METS without anginal complaints or limitations.  At her previous visit with me she was noted to be hypertensive with uncontrolled blood pressure readings.  I increased her clonidine to 3 times a day and added spironolactone 25 mg daily.  I personally compared her home blood pressure machine to manual today with a 14mmhg difference in systolic readings her machine was higher than my manual reading.  Her home blood pressure log was personally reviewed with consistent systolic readings  130-150 which may be more controlled given difference in manual reading today.  Her blood pressure today is improved from prior readings.     She had prior secondary work-up with no definitive evidence of renal artery stenosis however left 2 cm smaller than the right, laboratory findings unremarkable.     She had an echocardiogram in March 2022 with normal LVEF, no wall motion abnormalities.     The patient denies chest  Problem: Discharge Planning  Goal: Discharge to home or other facility with appropriate resources  3/10/2023 1642 by Nhung Christy RN  Outcome: Adequate for Discharge  3/10/2023 1639 by Nhung Christy RN  Outcome: Adequate for Discharge  3/10/2023 1639 by Nhung Christy RN  Outcome: Progressing     Problem: Skin/Tissue Integrity  Goal: Absence of new skin breakdown  Description: 1. Monitor for areas of redness and/or skin breakdown  2. Assess vascular access sites hourly  3. Every 4-6 hours minimum:  Change oxygen saturation probe site  4. Every 4-6 hours:  If on nasal continuous positive airway pressure, respiratory therapy assess nares and determine need for appliance change or resting period.   3/10/2023 1642 by Nhung Christy RN  Outcome: Adequate for Discharge  3/10/2023 1639 by Nhung Christy RN  Outcome: Adequate for Discharge  3/10/2023 1639 by Nhung Christy RN  Outcome: Progressing     Problem: Safety - Adult  Goal: Free from fall injury  3/10/2023 1642 by Nhung Christy RN  Outcome: Adequate for Discharge  3/10/2023 1639 by Nhung Christy RN  Outcome: Adequate for Discharge  3/10/2023 1639 by Nhung Christy RN  Outcome: Progressing     Problem: Chronic Conditions and Co-morbidities  Goal: Patient's chronic conditions and co-morbidity symptoms are monitored and maintained or improved  3/10/2023 1642 by Nhung Christy RN  Outcome: Adequate for Discharge  3/10/2023 1639 by Nhung Christy RN  Outcome: Adequate for Discharge  3/10/2023 1639 by Nhung Christy RN  Outcome: Progressing     Problem: Nutrition Deficit:  Goal: Optimize nutritional status  3/10/2023 1642 by Nhung Christy RN  Outcome: Adequate for Discharge  3/10/2023 1639 by Nhung Christy RN  Outcome: Adequate for Discharge  3/10/2023 1639 by Nhung Christy RN  Outcome: Progressing pain, shortness of breath, dizziness, palpitations, PND, orthopnea, or lower extremity edema.     REVIEW OF SYSTEMS:  A full 12 point review of systems was conducted by me personally and is negative except as noted above in HPI.    PAST MEDICAL HISTORY:  Past Medical History:   Diagnosis Date   • Anemia    • Aortic stenosis, mild    • Chest discomfort    • CKD (chronic kidney disease)    • COPD (chronic obstructive pulmonary disease) (CMD)    • Coronary artery disease    • Diabetes mellitus (CMD)    • Dyslipidemia    • Gout flare    • High cholesterol    • HTN (hypertension)    • Hypertension    • Hypomagnesemia    • Liver disease    • Malignant neoplasm (CMD)    • Obesity    • Palpitations    • Renal insufficiency       PAST SURGICAL HISTORY:  Past Surgical History:   Procedure Laterality Date   • Carpal tunnel release     • Hernia repair      VENTRAL   • Hysterectomy        SOCIAL HISTORY:   Social History     Tobacco Use   Smoking Status Former   • Current packs/day: 0.00   • Average packs/day: 1.5 packs/day for 43.0 years (64.5 pk-yrs)   • Types: Cigarettes   • Start date:    • Quit date:    • Years since quittin.4   Smokeless Tobacco Never     FAMILY HISTORY:  Family History   Problem Relation Age of Onset   • Myocardial Infarction Brother    • Cancer, Skin Brother    • Atrial Fibrilliation Brother    • Cancer, Lung Mother    • Cancer Father 54        BRAIN   • Cancer, Lung Brother      ALLERGIES:   ALLERGIES:  Penicillins   MEDICATIONS:   Current Outpatient Medications   Medication Sig Dispense Refill   • amlodipine-benazepril (LOTREL) 10-40 MG per capsule Take 1 capsule by mouth daily.     • spironolactone (ALDACTONE) 25 MG tablet Take 1 tablet by mouth daily. 30 tablet 3   • pantoprazole (Protonix) 40 MG tablet Take 1 tablet by mouth daily. 30 tablet 3   • MAGNESIUM OXIDE PO Take 400 mg by mouth 2 times daily.     • aspirin 81 MG tablet Take 81 mg by mouth daily.     • MULTIPLE VITAMIN PO Take  by mouth daily.      • cholecalciferol (VITAMIN D3) 1000 UNITS tablet Take 1,000 Units by mouth daily.     • travoprost, benzalkonium, (TRAVATAN) 0.004 % ophthalmic solution Place 1 drop into both eyes nightly.      • allopurinol (ZYLOPRIM) 100 MG tablet Take 1 tablet by mouth daily.     • cloNIDine (CATAPRES) 0.2 MG tablet Take 0.2 mg by mouth 3 times daily.     • Omega-3 Fatty Acids (FISH OIL) 1000 MG capsule Take 1 g by mouth daily.      • hydrochlorothiazide (HYDRODIURIL) 25 MG tablet Take 1 tablet by mouth daily.     • metFORMIN (GLUCOPHAGE) 500 MG tablet Take 1 tablet by mouth daily.     • atorvastatin (LIPITOR) 20 MG tablet TAKE 1 TABLET AT BEDTIME       No current facility-administered medications for this visit.       PHYSICAL  EXAMINATION  Visit Vitals  BP (!) 140/72 (BP Location: LUE - Left upper extremity, Patient Position: Sitting)   Pulse 82   Wt 89 kg (196 lb 3.2 oz)   LMP  (LMP Unknown)   BMI 38.32 kg/m²       Physical Exam  Constitutional:       Appearance: Normal appearance. She is obese.   Cardiovascular:      Rate and Rhythm: Normal rate and regular rhythm.      Pulses: Normal pulses.      Heart sounds: Normal heart sounds.   Pulmonary:      Effort: Pulmonary effort is normal.      Breath sounds: Normal breath sounds.   Musculoskeletal:         General: Normal range of motion.   Skin:     General: Skin is warm and dry.   Neurological:      Mental Status: She is alert and oriented to person, place, and time.   Psychiatric:         Behavior: Behavior normal.       Labs:  Sodium (mmol/L)   Date Value   03/18/2022 140   10/19/2021 136     Potassium (mmol/L)   Date Value   03/18/2022 4.1   10/19/2021 4.9     BUN (mg/dL)   Date Value   03/18/2022 35 (H)   10/19/2021 44 (H)     Creatinine (mg/dL)   Date Value   03/18/2022 1.69 (H)   10/19/2021 1.66 (H)     No results found for: CHOLESTEROL  No results found for: HDL  No results found for: TRIGLYCERIDE  No results found for: CALCLDL  TSH   Date Value    02/13/2020 4.103 mcUnits/mL   02/13/2020     Findings most consistent with euthyroid state, no additional testing suggested. TSH may be normal in patients with thyroid dysfunction and pituitary disease. Clinical correlation recommended.   02/13/2020     (Reflex TSH algorithm is not recommended in hospitalized patients. A variety of drugs, as well as serious acute and chronic illnesses may alter thyroid function tests. Commonly implicated drugs include glucocorticoids, dopamine, carbamazepine, iodine,   02/13/2020  amiodarone, lithium and heparin.)       Echocardiogram:  3/19/22  1. Left ventricle: The cavity size is normal. Wall thickness is normal.     The ejection fraction was measured by single plane method of disks.     Doppler parameters are consistent with abnormal left ventricular     relaxation (grade 1 diastolic dysfunction).  2. Aortic valve: There is mild stenosis.  3. Pericardium, extracardiac: A trivial pericardial effusion is     identified.  Cardiac cath:   No results found for this or any previous visit.        IMPRESSION/PLAN:     Preoperative cardiac evaluation  -Knee replacement  -TTE 3/22 no WMA  - Pt does > 4 mets of activity on a regular basis without episodes of angina, decompensated heart failure, unstable arrhythmia or significant valvular disease.The patient may proceed with surgery as scheduled as low- moderate risk.    Primary hypertension  -Blood pressure is more controlled since increasing her clonidine to 3 times a day and starting spironolactone 25 mg daily.  I personally checked her home blood pressure machine to manual today with a systolic difference of 14 mmHg with manual being lower than her machine.  Home systolic readings consistently 130-150s which may be reading higher also as her blood pressure cuff may not be of appropriate size but this is the largest cuff that she was able to purchase.  -Continue current medical regimen     Dyslipidemia  -2021 CHOL 168/TRIG 248/HDL  46/LDL 81  -Continue statin     Aortic stenosis, mild  -TTE 3/22 Mild AS   - Asymptomatic    Diabetes mellitus II  -On metformin  -Managed per PCP  Preoperative Evaluation  Type of surgery: None urgent intermediate risk procedure    Active Cardiac Conditions:   Unstable Coronary Syndrome or recent MI (7 to 30 days ago) -no  Decompensated Heart Failure -no  Significant Arrhythmias -no  Severe Valvular Disease -yes     Activity Level: good functional capacity (able to perform >/= 4 METS) -yes    Revised Clinical Risk Index:   Diabetes -yes  Ischemic Heart Disease -no  Cerebrovascular Disease -no  History of Heart Failure -no  Renal Insufficiency (creatinine > 2) -no    Previous Stress Test (< 2 years): No  Previous Revascularization (< 5 years): No      6/20/2023    No follow-ups on file.    ALBAN Parra [facility]

## 2023-07-07 PROBLEM — R79.89 ELEVATED TROPONIN: Status: RESOLVED | Noted: 2023-03-09 | Resolved: 2023-07-07

## 2023-10-10 ENCOUNTER — HOSPITAL ENCOUNTER (EMERGENCY)
Facility: HOSPITAL | Age: 63
Discharge: HOME | End: 2023-10-10
Payer: COMMERCIAL

## 2023-10-10 ENCOUNTER — APPOINTMENT (OUTPATIENT)
Facility: HOSPITAL | Age: 63
End: 2023-10-10
Payer: COMMERCIAL

## 2023-10-10 VITALS
RESPIRATION RATE: 15 BRPM | BODY MASS INDEX: 23.54 KG/M2 | DIASTOLIC BLOOD PRESSURE: 115 MMHG | WEIGHT: 150 LBS | OXYGEN SATURATION: 97 % | HEART RATE: 88 BPM | HEIGHT: 67 IN | SYSTOLIC BLOOD PRESSURE: 168 MMHG | TEMPERATURE: 97.9 F

## 2023-10-10 DIAGNOSIS — R06.02 SHORTNESS OF BREATH: Primary | ICD-10-CM

## 2023-10-10 LAB
ALBUMIN SERPL-MCNC: 3 G/DL (ref 3.4–5)
ALBUMIN/GLOB SERPL: 0.8 (ref 0.8–1.7)
ALP SERPL-CCNC: 70 U/L (ref 45–117)
ALT SERPL-CCNC: 56 U/L (ref 16–61)
ANION GAP SERPL CALC-SCNC: 5 MMOL/L (ref 3–18)
AST SERPL-CCNC: 41 U/L (ref 10–38)
BASOPHILS # BLD: 0 K/UL (ref 0–0.1)
BASOPHILS NFR BLD: 0 % (ref 0–2)
BILIRUB SERPL-MCNC: 0.6 MG/DL (ref 0.2–1)
BUN SERPL-MCNC: 23 MG/DL (ref 7–18)
BUN/CREAT SERPL: 13 (ref 12–20)
CALCIUM SERPL-MCNC: 8.3 MG/DL (ref 8.5–10.1)
CHLORIDE SERPL-SCNC: 108 MMOL/L (ref 100–111)
CO2 SERPL-SCNC: 24 MMOL/L (ref 21–32)
CREAT SERPL-MCNC: 1.73 MG/DL (ref 0.6–1.3)
DIFFERENTIAL METHOD BLD: ABNORMAL
EKG ATRIAL RATE: 81 BPM
EKG DIAGNOSIS: NORMAL
EKG P AXIS: 67 DEGREES
EKG P-R INTERVAL: 186 MS
EKG Q-T INTERVAL: 428 MS
EKG QRS DURATION: 92 MS
EKG QTC CALCULATION (BAZETT): 497 MS
EKG R AXIS: -48 DEGREES
EKG T AXIS: 50 DEGREES
EKG VENTRICULAR RATE: 81 BPM
EOSINOPHIL # BLD: 0 K/UL (ref 0–0.4)
EOSINOPHIL NFR BLD: 0 % (ref 0–5)
ERYTHROCYTE [DISTWIDTH] IN BLOOD BY AUTOMATED COUNT: 12.1 % (ref 11.6–14.5)
GLOBULIN SER CALC-MCNC: 3.6 G/DL (ref 2–4)
GLUCOSE SERPL-MCNC: 180 MG/DL (ref 74–99)
HCT VFR BLD AUTO: 36.6 % (ref 36–48)
HGB BLD-MCNC: 12.2 G/DL (ref 13–16)
IMM GRANULOCYTES # BLD AUTO: 0 K/UL (ref 0–0.04)
IMM GRANULOCYTES NFR BLD AUTO: 0 % (ref 0–0.5)
LYMPHOCYTES # BLD: 1.4 K/UL (ref 0.9–3.6)
LYMPHOCYTES NFR BLD: 29 % (ref 21–52)
MCH RBC QN AUTO: 28.2 PG (ref 24–34)
MCHC RBC AUTO-ENTMCNC: 33.3 G/DL (ref 31–37)
MCV RBC AUTO: 84.5 FL (ref 78–100)
MONOCYTES # BLD: 0.6 K/UL (ref 0.05–1.2)
MONOCYTES NFR BLD: 12 % (ref 3–10)
NEUTS SEG # BLD: 2.9 K/UL (ref 1.8–8)
NEUTS SEG NFR BLD: 58 % (ref 40–73)
NRBC # BLD: 0 K/UL (ref 0–0.01)
NRBC BLD-RTO: 0 PER 100 WBC
NT PRO BNP: ABNORMAL PG/ML (ref 0–900)
PLATELET # BLD AUTO: 253 K/UL (ref 135–420)
PMV BLD AUTO: 10.8 FL (ref 9.2–11.8)
POTASSIUM SERPL-SCNC: 3.6 MMOL/L (ref 3.5–5.5)
PROT SERPL-MCNC: 6.6 G/DL (ref 6.4–8.2)
RBC # BLD AUTO: 4.33 M/UL (ref 4.35–5.65)
SODIUM SERPL-SCNC: 137 MMOL/L (ref 136–145)
TROPONIN I SERPL HS-MCNC: 304 NG/L (ref 0–78)
TROPONIN I SERPL HS-MCNC: 384 NG/L (ref 0–78)
TROPONIN I SERPL HS-MCNC: 400 NG/L (ref 0–78)
WBC # BLD AUTO: 4.9 K/UL (ref 4.6–13.2)

## 2023-10-10 PROCEDURE — 6360000002 HC RX W HCPCS

## 2023-10-10 PROCEDURE — 83880 ASSAY OF NATRIURETIC PEPTIDE: CPT

## 2023-10-10 PROCEDURE — 85025 COMPLETE CBC W/AUTO DIFF WBC: CPT

## 2023-10-10 PROCEDURE — 71046 X-RAY EXAM CHEST 2 VIEWS: CPT

## 2023-10-10 PROCEDURE — 96374 THER/PROPH/DIAG INJ IV PUSH: CPT

## 2023-10-10 PROCEDURE — 6370000000 HC RX 637 (ALT 250 FOR IP)

## 2023-10-10 PROCEDURE — 93005 ELECTROCARDIOGRAM TRACING: CPT

## 2023-10-10 PROCEDURE — 6360000004 HC RX CONTRAST MEDICATION

## 2023-10-10 PROCEDURE — 71275 CT ANGIOGRAPHY CHEST: CPT

## 2023-10-10 PROCEDURE — 93010 ELECTROCARDIOGRAM REPORT: CPT | Performed by: INTERNAL MEDICINE

## 2023-10-10 PROCEDURE — 84484 ASSAY OF TROPONIN QUANT: CPT

## 2023-10-10 PROCEDURE — 80053 COMPREHEN METABOLIC PANEL: CPT

## 2023-10-10 PROCEDURE — 99285 EMERGENCY DEPT VISIT HI MDM: CPT

## 2023-10-10 RX ORDER — IODIXANOL 320 MG/ML
100 INJECTION, SOLUTION INTRAVASCULAR
Status: COMPLETED | OUTPATIENT
Start: 2023-10-10 | End: 2023-10-10

## 2023-10-10 RX ORDER — AMLODIPINE BESYLATE 5 MG/1
2.5 TABLET ORAL DAILY
Status: DISCONTINUED | OUTPATIENT
Start: 2023-10-10 | End: 2023-10-10 | Stop reason: HOSPADM

## 2023-10-10 RX ORDER — NITROGLYCERIN 0.4 MG/1
0.4 TABLET SUBLINGUAL ONCE
Status: COMPLETED | OUTPATIENT
Start: 2023-10-10 | End: 2023-10-10

## 2023-10-10 RX ORDER — CARVEDILOL 6.25 MG/1
6.25 TABLET ORAL
Status: COMPLETED | OUTPATIENT
Start: 2023-10-10 | End: 2023-10-10

## 2023-10-10 RX ORDER — FUROSEMIDE 10 MG/ML
40 INJECTION INTRAMUSCULAR; INTRAVENOUS ONCE
Status: COMPLETED | OUTPATIENT
Start: 2023-10-10 | End: 2023-10-10

## 2023-10-10 RX ORDER — FUROSEMIDE 10 MG/ML
40 INJECTION INTRAMUSCULAR; INTRAVENOUS ONCE
Status: DISCONTINUED | OUTPATIENT
Start: 2023-10-10 | End: 2023-10-10

## 2023-10-10 RX ADMIN — FUROSEMIDE 40 MG: 10 INJECTION, SOLUTION INTRAMUSCULAR; INTRAVENOUS at 17:28

## 2023-10-10 RX ADMIN — IODIXANOL 80 ML: 320 INJECTION, SOLUTION INTRAVASCULAR at 16:21

## 2023-10-10 RX ADMIN — AMLODIPINE BESYLATE 2.5 MG: 5 TABLET ORAL at 16:49

## 2023-10-10 RX ADMIN — CARVEDILOL 6.25 MG: 6.25 TABLET, FILM COATED ORAL at 16:49

## 2023-10-10 RX ADMIN — NITROGLYCERIN 0.4 MG: 0.4 TABLET, ORALLY DISINTEGRATING SUBLINGUAL at 15:33

## 2023-10-10 ASSESSMENT — ENCOUNTER SYMPTOMS
RHINORRHEA: 0
SHORTNESS OF BREATH: 1
DIARRHEA: 0
CHEST TIGHTNESS: 0
NAUSEA: 0
VOMITING: 0
ABDOMINAL DISTENTION: 0
ABDOMINAL PAIN: 0

## 2023-10-10 ASSESSMENT — LIFESTYLE VARIABLES
HOW MANY STANDARD DRINKS CONTAINING ALCOHOL DO YOU HAVE ON A TYPICAL DAY: 3 OR 4
HOW OFTEN DO YOU HAVE A DRINK CONTAINING ALCOHOL: 2-3 TIMES A WEEK

## 2023-10-10 ASSESSMENT — PAIN DESCRIPTION - LOCATION: LOCATION: CHEST

## 2023-10-10 ASSESSMENT — PAIN SCALES - GENERAL: PAINLEVEL_OUTOF10: 5

## 2023-10-10 NOTE — ED TRIAGE NOTES
C/O of shortness of breath for about 3 days now. Patient has a history of COPD and heart failure. Patient said he has not had his medication in some days.

## 2023-10-10 NOTE — DISCHARGE INSTRUCTIONS
Please follow-up with [facility] medicine on 10/13/2023, you are prescriptions have been refilled to the pharmacy, please return to ED immediately worsening or new development of symptoms.

## 2023-10-10 NOTE — ED PROVIDER NOTES
EMERGENCY DEPARTMENT HISTORY AND PHYSICAL EXAM      Patient Name: Iliana Medina  MRN: [id number]  YOB: 1960  Provider: Daphnie Peguero PA-C  PCP: Carmel Hernandez MD   Time/Date of evaluation: 12:42 PM EDT on 10/10/23    History of Presenting Illness     Chief Complaint   Patient presents with    Shortness of Breath       History Provided By: Patient     History Kerwin Sanchez): Iliana Medina is a 60 y.o. male with a PMHX of cardiomyopathy, cocaine abuse, hypertension, stroke, COPD  who presents to the emergency department  by POV C/O shortness of breath, that is worse with exertion and lying flat. Patient states that he has been out of his medication for the past 2 days and has not been taking them. Patient is unsure of what medications he has ran out of, and unsure of what medication he should be on. Patient also endorsed cocaine use 2 days ago.     Denies any fever, cough, congestion, chest pain, hematemesis    Past History     Past Medical History:  Past Medical History:   Diagnosis Date    Cardiomyopathy ([address])     Cocaine abuse ([address])     HTN (hypertension)     Hypertension     Stroke [facility])     Stroke risk [id number], 2009    pt stated he had a stroke in above dates       Past Surgical History:  Past Surgical History:   Procedure Laterality Date    CARDIAC PROCEDURE Right 3/10/2023    LEFT HEART CATH / CORONARY ANGIOGRAPHY performed by Torsten Mosley MD at [address] CATH LAB    CARDIAC PROCEDURE Right 3/10/2023    Left ventriculography performed by Torsten Mosley MD at [address] CATH [address] [place] [address] Court    right front of the forehead    [address]       Family History:  Family History   Problem Relation Age of Onset    Hypertension Father     Asthma Sister     Cancer Mother     Diabetes Father        Social History:  Social History     Tobacco Use    Smoking status: Some Days     Packs/day: 1     Types: Cigarettes    Smokeless tobacco: Never   Substance Use Topics

## 2023-10-10 NOTE — ED NOTES
Ambulated pt with O2 sensor on. Pt O2 remained between 96-98%. PA made aware of results.      Neo Matthews RN  10/10/23 8731

## 2023-10-10 NOTE — CONSULTS
[facility]  CONSULT NOTE    Patient:    Ottoniel Barney , 60 y.o. male   Room/Bed:  ER17/17  Admission Date:   10/10/2023  Code status:  Prior      Reason for Consult:SOB    ASSESSMENT:  Ottoniel Barney is a 60 y.o. male w/ a PMH of chronic combined systolic & diastolic HF, COPD, cardiomyopathy, HTN, PSA use, CKD 3B, HLD, h/o CVA, and normocytic anemia, presenting for SOB, likely secondary to CHF exacerbation in the setting of medication nonadherence and recent cocaine use. He appears well, is in no respiratory distress, and would like to go home. Patient was discharged to home with refills of all of his medication sent to [facility].    SOB  -improved  -s/p 40mg IV lasix in ED  -likely 2/2 acute on chronic HF exacerbation as CTA chest significant for mild b/l pleural effusions and mild pulmonary edema. These findings were not visible on CXR. SOB also significantly improved w/ lasix dose in the ED. Patient recently used cocaine and has been out of medications for 3 days. Medication nonadherence and cocaine use seem to have significantly contributed to HF exacerbation  -ACS unlikely: VSS; EKG unchanged from prior, no CP, troponins were elevated 304-400 in the ED in the setting of CKD3b and recent cocaine use. Though his, troponins are chronically elevated to 3/400s. -PE unlikely: CTA w/o evidence, VSS  -On my exam, he was feeling much better and almost back to baseline  -Denies significant SOB on ambulation      RECOMMENDATIONS:  Re-start medications. All medications renewed in all scripts including Lasix 40mg BID, SPL 25mg, and Jardiance 10mg to [facility] on [employer] Follow up with clinic on 10/13/23 with Dr. Komal Garcia @ [address] [place] [address].  Continue home medications except as noted above    This patient was seen in [facility] ER by [facility] as a consult.  He was discharged directly from the ER to home.  -Will have the patient follow-up in clinic on 10/13/23 @ 4pm.    SUBJECTIVE:   JANE Garcia has consulted

## 2024-01-24 NOTE — HOME CARE
Received [facility]  referral for [facility] for CHF . Discharge order noted for today; spoke to patient,Verified demographics,explained [facility] service and answered all questions; Patient states he will be staying with his sister for awhile at [address] ,[address] ; [facility] referral processed to [facility] central Intake and scheduling . JANE BARDALES LPN. no solids before 11pm

## 2024-03-24 ENCOUNTER — APPOINTMENT (OUTPATIENT)
Facility: HOSPITAL | Age: 64
End: 2024-03-24
Payer: MEDICAID

## 2024-03-24 ENCOUNTER — HOSPITAL ENCOUNTER (EMERGENCY)
Facility: HOSPITAL | Age: 64
Discharge: HOME | End: 2024-03-24
Attending: STUDENT IN AN ORGANIZED HEALTH CARE EDUCATION/TRAINING PROGRAM
Payer: MEDICAID

## 2024-03-24 VITALS
RESPIRATION RATE: 22 BRPM | HEART RATE: 88 BPM | OXYGEN SATURATION: 98 % | DIASTOLIC BLOOD PRESSURE: 123 MMHG | BODY MASS INDEX: 22.76 KG/M2 | WEIGHT: 145 LBS | HEIGHT: 67 IN | SYSTOLIC BLOOD PRESSURE: 172 MMHG | TEMPERATURE: 98.8 F

## 2024-03-24 DIAGNOSIS — R79.89 ELEVATED TROPONIN: ICD-10-CM

## 2024-03-24 DIAGNOSIS — I50.21 ACUTE SYSTOLIC HEART FAILURE (HCC): Primary | ICD-10-CM

## 2024-03-24 DIAGNOSIS — I10 HYPERTENSION, UNSPECIFIED TYPE: ICD-10-CM

## 2024-03-24 DIAGNOSIS — I50.23 ACUTE ON CHRONIC SYSTOLIC CONGESTIVE HEART FAILURE (HCC): ICD-10-CM

## 2024-03-24 DIAGNOSIS — I50.43 ACUTE ON CHRONIC HEART FAILURE WITH REDUCED EJECTION FRACTION AND DIASTOLIC DYSFUNCTION (HCC): ICD-10-CM

## 2024-03-24 DIAGNOSIS — I50.41 ACUTE COMBINED SYSTOLIC AND DIASTOLIC CHF, NYHA CLASS 2 (HCC): ICD-10-CM

## 2024-03-24 LAB
ANION GAP SERPL CALC-SCNC: 7 MMOL/L (ref 3–18)
BASOPHILS # BLD: 0 K/UL (ref 0–0.1)
BASOPHILS NFR BLD: 0 % (ref 0–2)
BUN SERPL-MCNC: 21 MG/DL (ref 7–18)
BUN/CREAT SERPL: 13 (ref 12–20)
CALCIUM SERPL-MCNC: 8.4 MG/DL (ref 8.5–10.1)
CHLORIDE SERPL-SCNC: 110 MMOL/L (ref 100–111)
CO2 SERPL-SCNC: 25 MMOL/L (ref 21–32)
CREAT SERPL-MCNC: 1.56 MG/DL (ref 0.6–1.3)
D DIMER PPP FEU-MCNC: 0.72 UG/ML(FEU)
DIFFERENTIAL METHOD BLD: ABNORMAL
EKG ATRIAL RATE: 95 BPM
EKG DIAGNOSIS: NORMAL
EKG P AXIS: 64 DEGREES
EKG P-R INTERVAL: 176 MS
EKG Q-T INTERVAL: 380 MS
EKG QRS DURATION: 94 MS
EKG QTC CALCULATION (BAZETT): 477 MS
EKG R AXIS: -24 DEGREES
EKG T AXIS: 95 DEGREES
EKG VENTRICULAR RATE: 95 BPM
EOSINOPHIL # BLD: 0 K/UL (ref 0–0.4)
EOSINOPHIL NFR BLD: 1 % (ref 0–5)
ERYTHROCYTE [DISTWIDTH] IN BLOOD BY AUTOMATED COUNT: 13.7 % (ref 11.6–14.5)
FLUAV RNA SPEC QL NAA+PROBE: NOT DETECTED
FLUBV RNA SPEC QL NAA+PROBE: NOT DETECTED
GLUCOSE SERPL-MCNC: 139 MG/DL (ref 74–99)
HCT VFR BLD AUTO: 34.8 % (ref 36–48)
HGB BLD-MCNC: 11.6 G/DL (ref 13–16)
IMM GRANULOCYTES # BLD AUTO: 0 K/UL (ref 0–0.04)
IMM GRANULOCYTES NFR BLD AUTO: 0 % (ref 0–0.5)
LACTATE SERPL-SCNC: 1.7 MMOL/L (ref 0.4–2)
LYMPHOCYTES # BLD: 1.9 K/UL (ref 0.9–3.6)
LYMPHOCYTES NFR BLD: 30 % (ref 21–52)
MAGNESIUM SERPL-MCNC: 1.4 MG/DL (ref 1.6–2.6)
MCH RBC QN AUTO: 28.3 PG (ref 24–34)
MCHC RBC AUTO-ENTMCNC: 33.3 G/DL (ref 31–37)
MCV RBC AUTO: 84.9 FL (ref 78–100)
MONOCYTES # BLD: 0.8 K/UL (ref 0.05–1.2)
MONOCYTES NFR BLD: 13 % (ref 3–10)
NEUTS SEG # BLD: 3.7 K/UL (ref 1.8–8)
NEUTS SEG NFR BLD: 56 % (ref 40–73)
NRBC # BLD: 0 K/UL (ref 0–0.01)
NRBC BLD-RTO: 0 PER 100 WBC
NT PRO BNP: ABNORMAL PG/ML (ref 0–900)
PHOSPHATE SERPL-MCNC: 3.9 MG/DL (ref 2.5–4.9)
PLATELET # BLD AUTO: 250 K/UL (ref 135–420)
PMV BLD AUTO: 10.8 FL (ref 9.2–11.8)
POTASSIUM SERPL-SCNC: 3.9 MMOL/L (ref 3.5–5.5)
RBC # BLD AUTO: 4.1 M/UL (ref 4.35–5.65)
SARS-COV-2 RNA RESP QL NAA+PROBE: NOT DETECTED
SODIUM SERPL-SCNC: 142 MMOL/L (ref 136–145)
TROPONIN I SERPL HS-MCNC: 453 NG/L (ref 0–78)
TROPONIN I SERPL HS-MCNC: 473 NG/L (ref 0–78)
WBC # BLD AUTO: 6.5 K/UL (ref 4.6–13.2)

## 2024-03-24 PROCEDURE — 87636 SARSCOV2 & INF A&B AMP PRB: CPT

## 2024-03-24 PROCEDURE — 6360000002 HC RX W HCPCS

## 2024-03-24 PROCEDURE — 6370000000 HC RX 637 (ALT 250 FOR IP)

## 2024-03-24 PROCEDURE — 84484 ASSAY OF TROPONIN QUANT: CPT

## 2024-03-24 PROCEDURE — 96365 THER/PROPH/DIAG IV INF INIT: CPT

## 2024-03-24 PROCEDURE — 71045 X-RAY EXAM CHEST 1 VIEW: CPT

## 2024-03-24 PROCEDURE — 99285 EMERGENCY DEPT VISIT HI MDM: CPT

## 2024-03-24 PROCEDURE — 96366 THER/PROPH/DIAG IV INF ADDON: CPT

## 2024-03-24 PROCEDURE — 85379 FIBRIN DEGRADATION QUANT: CPT

## 2024-03-24 PROCEDURE — 80048 BASIC METABOLIC PNL TOTAL CA: CPT

## 2024-03-24 PROCEDURE — 83735 ASSAY OF MAGNESIUM: CPT

## 2024-03-24 PROCEDURE — 93010 ELECTROCARDIOGRAM REPORT: CPT | Performed by: INTERNAL MEDICINE

## 2024-03-24 PROCEDURE — 93005 ELECTROCARDIOGRAM TRACING: CPT | Performed by: STUDENT IN AN ORGANIZED HEALTH CARE EDUCATION/TRAINING PROGRAM

## 2024-03-24 PROCEDURE — 85025 COMPLETE CBC W/AUTO DIFF WBC: CPT

## 2024-03-24 PROCEDURE — 96374 THER/PROPH/DIAG INJ IV PUSH: CPT

## 2024-03-24 PROCEDURE — 84100 ASSAY OF PHOSPHORUS: CPT

## 2024-03-24 PROCEDURE — 83605 ASSAY OF LACTIC ACID: CPT

## 2024-03-24 PROCEDURE — 83880 ASSAY OF NATRIURETIC PEPTIDE: CPT

## 2024-03-24 RX ORDER — MAGNESIUM SULFATE IN WATER 40 MG/ML
2000 INJECTION, SOLUTION INTRAVENOUS
Status: COMPLETED | OUTPATIENT
Start: 2024-03-24 | End: 2024-03-24

## 2024-03-24 RX ORDER — CARVEDILOL 6.25 MG/1
6.25 TABLET ORAL 2 TIMES DAILY WITH MEALS
Qty: 60 TABLET | Refills: 2 | Status: SHIPPED | OUTPATIENT
Start: 2024-03-24 | End: 2024-06-22

## 2024-03-24 RX ORDER — AMLODIPINE BESYLATE 5 MG/1
5 TABLET ORAL DAILY
Status: DISCONTINUED | OUTPATIENT
Start: 2024-03-24 | End: 2024-03-24 | Stop reason: HOSPADM

## 2024-03-24 RX ORDER — FUROSEMIDE 40 MG/1
40 TABLET ORAL 2 TIMES DAILY
Qty: 60 TABLET | Refills: 2 | Status: SHIPPED | OUTPATIENT
Start: 2024-03-24 | End: 2024-06-22

## 2024-03-24 RX ORDER — ASPIRIN 81 MG/1
81 TABLET, CHEWABLE ORAL DAILY
Qty: 30 TABLET | Refills: 2 | Status: SHIPPED | OUTPATIENT
Start: 2024-03-24 | End: 2024-06-22

## 2024-03-24 RX ORDER — ISOSORBIDE MONONITRATE 60 MG/1
60 TABLET, EXTENDED RELEASE ORAL DAILY
Qty: 30 TABLET | Refills: 2 | Status: SHIPPED | OUTPATIENT
Start: 2024-03-24 | End: 2024-06-22

## 2024-03-24 RX ORDER — ASPIRIN 81 MG/1
324 TABLET, CHEWABLE ORAL ONCE
Status: COMPLETED | OUTPATIENT
Start: 2024-03-24 | End: 2024-03-24

## 2024-03-24 RX ORDER — SPIRONOLACTONE 25 MG/1
25 TABLET ORAL DAILY
Qty: 30 TABLET | Refills: 2 | Status: SHIPPED | OUTPATIENT
Start: 2024-03-24 | End: 2024-06-22

## 2024-03-24 RX ORDER — FUROSEMIDE 10 MG/ML
40 INJECTION INTRAMUSCULAR; INTRAVENOUS
Status: COMPLETED | OUTPATIENT
Start: 2024-03-24 | End: 2024-03-24

## 2024-03-24 RX ORDER — ATORVASTATIN CALCIUM 40 MG/1
40 TABLET, FILM COATED ORAL
Qty: 90 TABLET | Refills: 0 | Status: SHIPPED | OUTPATIENT
Start: 2024-03-24 | End: 2024-06-22

## 2024-03-24 RX ORDER — CARVEDILOL 6.25 MG/1
6.25 TABLET ORAL
Status: COMPLETED | OUTPATIENT
Start: 2024-03-24 | End: 2024-03-24

## 2024-03-24 RX ORDER — SPIRONOLACTONE 25 MG/1
25 TABLET ORAL DAILY
Status: DISCONTINUED | OUTPATIENT
Start: 2024-03-24 | End: 2024-03-24 | Stop reason: HOSPADM

## 2024-03-24 RX ADMIN — MAGNESIUM SULFATE HEPTAHYDRATE 2000 MG: 40 INJECTION, SOLUTION INTRAVENOUS at 02:30

## 2024-03-24 RX ADMIN — SACUBITRIL AND VALSARTAN 1 TABLET: 49; 51 TABLET, FILM COATED ORAL at 02:28

## 2024-03-24 RX ADMIN — NITROGLYCERIN 1 INCH: 20 OINTMENT TOPICAL at 01:46

## 2024-03-24 RX ADMIN — CARVEDILOL 6.25 MG: 6.25 TABLET, FILM COATED ORAL at 02:28

## 2024-03-24 RX ADMIN — ASPIRIN 81 MG CHEWABLE TABLET 324 MG: 81 TABLET CHEWABLE at 01:46

## 2024-03-24 RX ADMIN — FUROSEMIDE 40 MG: 10 INJECTION, SOLUTION INTRAMUSCULAR; INTRAVENOUS at 01:48

## 2024-03-24 ASSESSMENT — PAIN SCALES - GENERAL: PAINLEVEL_OUTOF10: 7

## 2024-03-24 ASSESSMENT — PAIN DESCRIPTION - LOCATION: LOCATION: CHEST

## 2024-03-24 ASSESSMENT — PAIN - FUNCTIONAL ASSESSMENT: PAIN_FUNCTIONAL_ASSESSMENT: 0-10

## 2024-03-24 NOTE — ED NOTES
Ambulated patient in escobedo. Pt maintained 97% - 98% O2. Pt had no desaturations. Pt ambulated independently without assistance. Pt now resting back on stretcher. Pt on monitor. NAD at this time.

## 2024-03-24 NOTE — ED PROVIDER NOTES
chewable tablet 324 mg (324 mg Oral Given 3/24/24 0146)   carvedilol (COREG) tablet 6.25 mg (6.25 mg Oral Given 3/24/24 0228)              Medical Decision Making   Initial Medical Decision Making and DDx:  63-year-old male past medical history hypertension, HFrEF (TTE 6/7/23 EF 20 to 25%), hypertension, hyperlipidemia, NSTEMI, presenting to the emergency department due to chest pain and shortness of breath has been present for the last 2 days in the setting of several days of missing meds.  PE remarkable for JVD, inspiratory crackles bilaterally.  Cardiac point-of-care ultrasound demonstrating EPSS 18.9 mm, no pericardial effusion, no evidence of right heart dilation.  Differential at this time includes acute heart failure secondary to medication noncompliance versus NSTEMI versus cardiac valvulopathy.  Pulmonary embolism considered given patient's multiple comorbidities, dyspnea and chest pain, however less likely given no evidence of tachycardia or hypoxia appreciated on vital signs. Do not believe patient demonstrates SCAPE physiology at this time. Plan as follows:    -CBC, BMP, Trop, NTproBNP, Lactate  -RVP  -D-Dimer  -Mg, Phos  -CXR  -EKG  -Nitro paste.  -Will restart GDMT. If still hypertensive, will add on ACEi for further afterload reduction.  -Lasix 40 mg iv    Dispo likely admission.    ED Course: Progress Notes, Reevaluation, and Consults:  ED Course as of 03/24/24 0406   Sun Mar 24, 2024   0212 D-Dimer, Quant(!): 0.72 [MT]   0221 NT Pro-BNP(!): 21,763 [MT]   0325 Troponin, High Sensitivity(!!): 453 [MT]   0335 Troponin [MT]   0345 NT Pro-BNP(!): 21,763 [MT]   0349 Troponin, High Sensitivity(!!): 453 [MT]      ED Course User Index  [MT] Reinaldo Avila MD       3:16 AM EDT: Patient pain improved after administration of nitroglycerin paste and loading with aspirin.  I have restarted all of patient's home GDMT medications.  Additionally, patient moderately hypomagnesemic, repleted with 2 g magnesium IV.

## 2024-03-24 NOTE — DISCHARGE INSTRUCTIONS
You have been evaluated in the emergency department for chest pain and shortness of breath.  Your physical exam, lab work all demonstrated that you do not have disease requiring inpatient hospitalization at this time.  You passed her walk test without desaturating.  Your chest pain resolved after we controlled your blood pressure.  It is important for you to take your medications as prescribed, specifically your goal-directed medical therapy of Entresto, Aldactone, and coreg.  Please return to the emergency department if you develop worsening chest pain, trouble breathing, episodes of dizziness or passing out, vomiting, or any other symptoms that are significant to you.

## 2024-05-08 ENCOUNTER — APPOINTMENT (OUTPATIENT)
Facility: HOSPITAL | Age: 64
End: 2024-05-08
Payer: MEDICAID

## 2024-05-08 ENCOUNTER — HOSPITAL ENCOUNTER (INPATIENT)
Facility: HOSPITAL | Age: 64
LOS: 1 days | Discharge: HOME | End: 2024-05-09
Attending: STUDENT IN AN ORGANIZED HEALTH CARE EDUCATION/TRAINING PROGRAM | Admitting: FAMILY MEDICINE
Payer: MEDICAID

## 2024-05-08 DIAGNOSIS — R07.9 CHEST PAIN, UNSPECIFIED TYPE: Primary | ICD-10-CM

## 2024-05-08 DIAGNOSIS — I50.21 ACUTE HFREF (HEART FAILURE WITH REDUCED EJECTION FRACTION) (HCC): ICD-10-CM

## 2024-05-08 DIAGNOSIS — I10 HYPERTENSION, UNSPECIFIED TYPE: ICD-10-CM

## 2024-05-08 DIAGNOSIS — I50.23 ACUTE ON CHRONIC SYSTOLIC CONGESTIVE HEART FAILURE (HCC): ICD-10-CM

## 2024-05-08 DIAGNOSIS — I50.43 ACUTE ON CHRONIC HEART FAILURE WITH REDUCED EJECTION FRACTION AND DIASTOLIC DYSFUNCTION (HCC): ICD-10-CM

## 2024-05-08 DIAGNOSIS — I50.41 ACUTE COMBINED SYSTOLIC AND DIASTOLIC CHF, NYHA CLASS 2 (HCC): ICD-10-CM

## 2024-05-08 LAB
ALBUMIN SERPL-MCNC: 3.2 G/DL (ref 3.4–5)
ALBUMIN/GLOB SERPL: 0.9 (ref 0.8–1.7)
ALP SERPL-CCNC: 72 U/L (ref 45–117)
ALT SERPL-CCNC: 56 U/L (ref 16–61)
AMPHET UR QL SCN: NEGATIVE
ANION GAP SERPL CALC-SCNC: 5 MMOL/L (ref 3–18)
AST SERPL-CCNC: 55 U/L (ref 10–38)
BARBITURATES UR QL SCN: NEGATIVE
BASOPHILS # BLD: 0 K/UL (ref 0–0.1)
BASOPHILS NFR BLD: 0 % (ref 0–2)
BENZODIAZ UR QL: NEGATIVE
BILIRUB SERPL-MCNC: 0.5 MG/DL (ref 0.2–1)
BUN SERPL-MCNC: 24 MG/DL (ref 7–18)
BUN/CREAT SERPL: 13 (ref 12–20)
CALCIUM SERPL-MCNC: 8.5 MG/DL (ref 8.5–10.1)
CANNABINOIDS UR QL SCN: NEGATIVE
CHLORIDE SERPL-SCNC: 112 MMOL/L (ref 100–111)
CK SERPL-CCNC: 79 U/L (ref 39–308)
CO2 SERPL-SCNC: 24 MMOL/L (ref 21–32)
COCAINE UR QL SCN: POSITIVE
CREAT SERPL-MCNC: 1.79 MG/DL (ref 0.6–1.3)
DIFFERENTIAL METHOD BLD: ABNORMAL
ECHO AO ASC DIAM: 2.4 CM
ECHO AO ASCENDING AORTA INDEX: 1.36 CM/M2
ECHO AO ROOT DIAM: 3.6 CM
ECHO AO ROOT INDEX: 2.05 CM/M2
ECHO BSA: 1.76 M2
ECHO EST RA PRESSURE: 8 MMHG
ECHO LV EDV A2C: 189 ML
ECHO LV EDV A4C: 187 ML
ECHO LV EDV BP: 187 ML (ref 67–155)
ECHO LV EDV INDEX A4C: 106 ML/M2
ECHO LV EDV INDEX BP: 106 ML/M2
ECHO LV EDV NDEX A2C: 107 ML/M2
ECHO LV EJECTION FRACTION A2C: 25 %
ECHO LV EJECTION FRACTION A4C: 20 %
ECHO LV EJECTION FRACTION BIPLANE: 22 % (ref 55–100)
ECHO LV ESV A2C: 142 ML
ECHO LV ESV A4C: 150 ML
ECHO LV ESV BP: 146 ML (ref 22–58)
ECHO LV ESV INDEX A2C: 81 ML/M2
ECHO LV ESV INDEX A4C: 85 ML/M2
ECHO LV ESV INDEX BP: 83 ML/M2
ECHO LV FRACTIONAL SHORTENING: 16 % (ref 28–44)
ECHO LV INTERNAL DIMENSION DIASTOLE INDEX: 2.9 CM/M2
ECHO LV INTERNAL DIMENSION DIASTOLIC: 5.1 CM (ref 4.2–5.9)
ECHO LV INTERNAL DIMENSION SYSTOLIC INDEX: 2.44 CM/M2
ECHO LV INTERNAL DIMENSION SYSTOLIC: 4.3 CM
ECHO LV IVSD: 1.1 CM (ref 0.6–1)
ECHO LV MASS 2D: 213.9 G (ref 88–224)
ECHO LV MASS INDEX 2D: 121.5 G/M2 (ref 49–115)
ECHO LV POSTERIOR WALL DIASTOLIC: 1.1 CM (ref 0.6–1)
ECHO LV RELATIVE WALL THICKNESS RATIO: 0.43
ECHO LVOT AREA: 4.2 CM2
ECHO LVOT DIAM: 2.3 CM
ECHO RA AREA 4C: 19.2 CM2
ECHO RIGHT VENTRICULAR SYSTOLIC PRESSURE (RVSP): 78 MMHG
ECHO RV BASAL DIMENSION: 4.8 CM
ECHO RV FREE WALL PEAK S': 8 CM/S
ECHO RV TAPSE: 1.9 CM (ref 1.7–?)
ECHO TV REGURGITANT MAX VELOCITY: 4.19 M/S
ECHO TV REGURGITANT PEAK GRADIENT: 70 MMHG
EKG ATRIAL RATE: 67 BPM
EKG ATRIAL RATE: 67 BPM
EKG DIAGNOSIS: NORMAL
EKG DIAGNOSIS: NORMAL
EKG P AXIS: 64 DEGREES
EKG P AXIS: 67 DEGREES
EKG P-R INTERVAL: 186 MS
EKG P-R INTERVAL: 194 MS
EKG Q-T INTERVAL: 414 MS
EKG Q-T INTERVAL: 444 MS
EKG QRS DURATION: 92 MS
EKG QRS DURATION: 92 MS
EKG QTC CALCULATION (BAZETT): 437 MS
EKG QTC CALCULATION (BAZETT): 469 MS
EKG R AXIS: -33 DEGREES
EKG R AXIS: -39 DEGREES
EKG T AXIS: -53 DEGREES
EKG T AXIS: 55 DEGREES
EKG VENTRICULAR RATE: 67 BPM
EKG VENTRICULAR RATE: 67 BPM
EOSINOPHIL # BLD: 0 K/UL (ref 0–0.4)
EOSINOPHIL NFR BLD: 1 % (ref 0–5)
ERYTHROCYTE [DISTWIDTH] IN BLOOD BY AUTOMATED COUNT: 13.3 % (ref 11.6–14.5)
ETHANOL SERPL-MCNC: <3 MG/DL (ref 0–3)
GLOBULIN SER CALC-MCNC: 3.7 G/DL (ref 2–4)
GLUCOSE SERPL-MCNC: 99 MG/DL (ref 74–99)
HCT VFR BLD AUTO: 35.1 % (ref 36–48)
HGB BLD-MCNC: 11.6 G/DL (ref 13–16)
IMM GRANULOCYTES # BLD AUTO: 0 K/UL (ref 0–0.04)
IMM GRANULOCYTES NFR BLD AUTO: 0 % (ref 0–0.5)
LYMPHOCYTES # BLD: 1.8 K/UL (ref 0.9–3.6)
LYMPHOCYTES NFR BLD: 41 % (ref 21–52)
Lab: ABNORMAL
MAGNESIUM SERPL-MCNC: 1.9 MG/DL (ref 1.6–2.6)
MCH RBC QN AUTO: 28.4 PG (ref 24–34)
MCHC RBC AUTO-ENTMCNC: 33 G/DL (ref 31–37)
MCV RBC AUTO: 85.8 FL (ref 78–100)
METHADONE UR QL: NEGATIVE
MONOCYTES # BLD: 0.6 K/UL (ref 0.05–1.2)
MONOCYTES NFR BLD: 14 % (ref 3–10)
NEUTS SEG # BLD: 1.9 K/UL (ref 1.8–8)
NEUTS SEG NFR BLD: 44 % (ref 40–73)
NRBC # BLD: 0 K/UL (ref 0–0.01)
NRBC BLD-RTO: 0 PER 100 WBC
NT PRO BNP: ABNORMAL PG/ML (ref 0–900)
OPIATES UR QL: NEGATIVE
PCP UR QL: NEGATIVE
PLATELET # BLD AUTO: 224 K/UL (ref 135–420)
PMV BLD AUTO: 10.3 FL (ref 9.2–11.8)
POTASSIUM SERPL-SCNC: 4.5 MMOL/L (ref 3.5–5.5)
PROT SERPL-MCNC: 6.9 G/DL (ref 6.4–8.2)
RBC # BLD AUTO: 4.09 M/UL (ref 4.35–5.65)
SODIUM SERPL-SCNC: 141 MMOL/L (ref 136–145)
TROPONIN I SERPL HS-MCNC: 387 NG/L (ref 0–78)
TROPONIN I SERPL HS-MCNC: 404 NG/L (ref 0–78)
WBC # BLD AUTO: 4.5 K/UL (ref 4.6–13.2)

## 2024-05-08 PROCEDURE — 99223 1ST HOSP IP/OBS HIGH 75: CPT | Performed by: INTERNAL MEDICINE

## 2024-05-08 PROCEDURE — 96374 THER/PROPH/DIAG INJ IV PUSH: CPT

## 2024-05-08 PROCEDURE — 93010 ELECTROCARDIOGRAM REPORT: CPT | Performed by: INTERNAL MEDICINE

## 2024-05-08 PROCEDURE — 83735 ASSAY OF MAGNESIUM: CPT

## 2024-05-08 PROCEDURE — 84484 ASSAY OF TROPONIN QUANT: CPT

## 2024-05-08 PROCEDURE — 6360000002 HC RX W HCPCS: Performed by: STUDENT IN AN ORGANIZED HEALTH CARE EDUCATION/TRAINING PROGRAM

## 2024-05-08 PROCEDURE — 6370000000 HC RX 637 (ALT 250 FOR IP): Performed by: PHYSICIAN ASSISTANT

## 2024-05-08 PROCEDURE — 80307 DRUG TEST PRSMV CHEM ANLYZR: CPT

## 2024-05-08 PROCEDURE — 6360000002 HC RX W HCPCS

## 2024-05-08 PROCEDURE — 71045 X-RAY EXAM CHEST 1 VIEW: CPT

## 2024-05-08 PROCEDURE — 93325 DOPPLER ECHO COLOR FLOW MAPG: CPT | Performed by: INTERNAL MEDICINE

## 2024-05-08 PROCEDURE — 6360000004 HC RX CONTRAST MEDICATION: Performed by: FAMILY MEDICINE

## 2024-05-08 PROCEDURE — 82550 ASSAY OF CK (CPK): CPT

## 2024-05-08 PROCEDURE — C8924 2D TTE W OR W/O FOL W/CON,FU: HCPCS

## 2024-05-08 PROCEDURE — 6370000000 HC RX 637 (ALT 250 FOR IP)

## 2024-05-08 PROCEDURE — 85025 COMPLETE CBC W/AUTO DIFF WBC: CPT

## 2024-05-08 PROCEDURE — 83880 ASSAY OF NATRIURETIC PEPTIDE: CPT

## 2024-05-08 PROCEDURE — 93308 TTE F-UP OR LMTD: CPT | Performed by: INTERNAL MEDICINE

## 2024-05-08 PROCEDURE — 80053 COMPREHEN METABOLIC PANEL: CPT

## 2024-05-08 PROCEDURE — 82077 ASSAY SPEC XCP UR&BREATH IA: CPT

## 2024-05-08 PROCEDURE — 2580000003 HC RX 258

## 2024-05-08 PROCEDURE — 93005 ELECTROCARDIOGRAM TRACING: CPT | Performed by: STUDENT IN AN ORGANIZED HEALTH CARE EDUCATION/TRAINING PROGRAM

## 2024-05-08 PROCEDURE — 99285 EMERGENCY DEPT VISIT HI MDM: CPT

## 2024-05-08 PROCEDURE — 93321 DOPPLER ECHO F-UP/LMTD STD: CPT | Performed by: INTERNAL MEDICINE

## 2024-05-08 PROCEDURE — 1100000000 HC RM PRIVATE

## 2024-05-08 RX ORDER — SODIUM CHLORIDE 0.9 % (FLUSH) 0.9 %
5-40 SYRINGE (ML) INJECTION EVERY 12 HOURS SCHEDULED
Status: DISCONTINUED | OUTPATIENT
Start: 2024-05-08 | End: 2024-05-09 | Stop reason: HOSPADM

## 2024-05-08 RX ORDER — SPIRONOLACTONE 25 MG/1
25 TABLET ORAL DAILY
Status: DISCONTINUED | OUTPATIENT
Start: 2024-05-08 | End: 2024-05-09 | Stop reason: HOSPADM

## 2024-05-08 RX ORDER — AMLODIPINE BESYLATE 5 MG/1
2.5 TABLET ORAL DAILY
Status: DISCONTINUED | OUTPATIENT
Start: 2024-05-08 | End: 2024-05-09 | Stop reason: HOSPADM

## 2024-05-08 RX ORDER — SODIUM CHLORIDE 0.9 % (FLUSH) 0.9 %
5-40 SYRINGE (ML) INJECTION PRN
Status: DISCONTINUED | OUTPATIENT
Start: 2024-05-08 | End: 2024-05-09 | Stop reason: HOSPADM

## 2024-05-08 RX ORDER — FUROSEMIDE 40 MG/1
40 TABLET ORAL 2 TIMES DAILY
Status: DISCONTINUED | OUTPATIENT
Start: 2024-05-08 | End: 2024-05-09 | Stop reason: HOSPADM

## 2024-05-08 RX ORDER — ONDANSETRON 2 MG/ML
4 INJECTION INTRAMUSCULAR; INTRAVENOUS EVERY 6 HOURS PRN
Status: DISCONTINUED | OUTPATIENT
Start: 2024-05-08 | End: 2024-05-09 | Stop reason: HOSPADM

## 2024-05-08 RX ORDER — MAGNESIUM SULFATE IN WATER 40 MG/ML
2000 INJECTION, SOLUTION INTRAVENOUS PRN
Status: DISCONTINUED | OUTPATIENT
Start: 2024-05-08 | End: 2024-05-09 | Stop reason: HOSPADM

## 2024-05-08 RX ORDER — ATORVASTATIN CALCIUM 40 MG/1
40 TABLET, FILM COATED ORAL NIGHTLY
Status: DISCONTINUED | OUTPATIENT
Start: 2024-05-08 | End: 2024-05-09 | Stop reason: HOSPADM

## 2024-05-08 RX ORDER — ACETAMINOPHEN 325 MG/1
650 TABLET ORAL EVERY 6 HOURS PRN
Status: DISCONTINUED | OUTPATIENT
Start: 2024-05-08 | End: 2024-05-09 | Stop reason: HOSPADM

## 2024-05-08 RX ORDER — ISOSORBIDE MONONITRATE 60 MG/1
60 TABLET, EXTENDED RELEASE ORAL DAILY
Status: DISCONTINUED | OUTPATIENT
Start: 2024-05-08 | End: 2024-05-09 | Stop reason: HOSPADM

## 2024-05-08 RX ORDER — SODIUM CHLORIDE 9 MG/ML
INJECTION, SOLUTION INTRAVENOUS PRN
Status: DISCONTINUED | OUTPATIENT
Start: 2024-05-08 | End: 2024-05-09 | Stop reason: HOSPADM

## 2024-05-08 RX ORDER — POLYETHYLENE GLYCOL 3350 17 G/17G
17 POWDER, FOR SOLUTION ORAL DAILY PRN
Status: DISCONTINUED | OUTPATIENT
Start: 2024-05-08 | End: 2024-05-09 | Stop reason: HOSPADM

## 2024-05-08 RX ORDER — ACETAMINOPHEN 650 MG/1
650 SUPPOSITORY RECTAL EVERY 6 HOURS PRN
Status: DISCONTINUED | OUTPATIENT
Start: 2024-05-08 | End: 2024-05-09 | Stop reason: HOSPADM

## 2024-05-08 RX ORDER — ASPIRIN 81 MG/1
81 TABLET, CHEWABLE ORAL DAILY
Status: DISCONTINUED | OUTPATIENT
Start: 2024-05-08 | End: 2024-05-09 | Stop reason: HOSPADM

## 2024-05-08 RX ORDER — POTASSIUM CHLORIDE 7.45 MG/ML
10 INJECTION INTRAVENOUS PRN
Status: DISCONTINUED | OUTPATIENT
Start: 2024-05-08 | End: 2024-05-09 | Stop reason: HOSPADM

## 2024-05-08 RX ORDER — CARVEDILOL 6.25 MG/1
6.25 TABLET ORAL 2 TIMES DAILY WITH MEALS
Status: DISCONTINUED | OUTPATIENT
Start: 2024-05-08 | End: 2024-05-09 | Stop reason: HOSPADM

## 2024-05-08 RX ORDER — FUROSEMIDE 10 MG/ML
40 INJECTION INTRAMUSCULAR; INTRAVENOUS 2 TIMES DAILY
Status: DISCONTINUED | OUTPATIENT
Start: 2024-05-08 | End: 2024-05-09 | Stop reason: HOSPADM

## 2024-05-08 RX ORDER — ENOXAPARIN SODIUM 100 MG/ML
40 INJECTION SUBCUTANEOUS DAILY
Status: DISCONTINUED | OUTPATIENT
Start: 2024-05-08 | End: 2024-05-09 | Stop reason: HOSPADM

## 2024-05-08 RX ORDER — FERROUS SULFATE 325(65) MG
325 TABLET ORAL 2 TIMES DAILY WITH MEALS
Status: DISCONTINUED | OUTPATIENT
Start: 2024-05-08 | End: 2024-05-09 | Stop reason: HOSPADM

## 2024-05-08 RX ORDER — HYDRALAZINE HYDROCHLORIDE 25 MG/1
25 TABLET, FILM COATED ORAL EVERY 8 HOURS SCHEDULED
Status: DISCONTINUED | OUTPATIENT
Start: 2024-05-08 | End: 2024-05-09 | Stop reason: HOSPADM

## 2024-05-08 RX ORDER — POTASSIUM CHLORIDE 20 MEQ/1
40 TABLET, EXTENDED RELEASE ORAL PRN
Status: DISCONTINUED | OUTPATIENT
Start: 2024-05-08 | End: 2024-05-09 | Stop reason: HOSPADM

## 2024-05-08 RX ORDER — FUROSEMIDE 10 MG/ML
40 INJECTION INTRAMUSCULAR; INTRAVENOUS ONCE
Status: COMPLETED | OUTPATIENT
Start: 2024-05-08 | End: 2024-05-08

## 2024-05-08 RX ORDER — ONDANSETRON 4 MG/1
4 TABLET, ORALLY DISINTEGRATING ORAL EVERY 8 HOURS PRN
Status: DISCONTINUED | OUTPATIENT
Start: 2024-05-08 | End: 2024-05-09 | Stop reason: HOSPADM

## 2024-05-08 RX ADMIN — CARVEDILOL 6.25 MG: 6.25 TABLET, FILM COATED ORAL at 17:40

## 2024-05-08 RX ADMIN — CARVEDILOL 6.25 MG: 6.25 TABLET, FILM COATED ORAL at 09:05

## 2024-05-08 RX ADMIN — SODIUM CHLORIDE, PRESERVATIVE FREE 10 ML: 5 INJECTION INTRAVENOUS at 09:05

## 2024-05-08 RX ADMIN — FUROSEMIDE 40 MG: 10 INJECTION, SOLUTION INTRAMUSCULAR; INTRAVENOUS at 17:40

## 2024-05-08 RX ADMIN — HYDRALAZINE HYDROCHLORIDE 25 MG: 25 TABLET ORAL at 15:17

## 2024-05-08 RX ADMIN — ENOXAPARIN SODIUM 40 MG: 40 INJECTION SUBCUTANEOUS at 09:05

## 2024-05-08 RX ADMIN — HYDRALAZINE HYDROCHLORIDE 25 MG: 25 TABLET ORAL at 21:24

## 2024-05-08 RX ADMIN — AMLODIPINE BESYLATE 2.5 MG: 5 TABLET ORAL at 10:22

## 2024-05-08 RX ADMIN — FERROUS SULFATE TAB 325 MG (65 MG ELEMENTAL FE) 325 MG: 325 (65 FE) TAB at 10:22

## 2024-05-08 RX ADMIN — ATORVASTATIN CALCIUM 40 MG: 40 TABLET, FILM COATED ORAL at 21:23

## 2024-05-08 RX ADMIN — ISOSORBIDE MONONITRATE 60 MG: 60 TABLET, EXTENDED RELEASE ORAL at 10:23

## 2024-05-08 RX ADMIN — ASPIRIN 81 MG CHEWABLE TABLET 81 MG: 81 TABLET CHEWABLE at 10:23

## 2024-05-08 RX ADMIN — FUROSEMIDE 40 MG: 10 INJECTION, SOLUTION INTRAMUSCULAR; INTRAVENOUS at 05:45

## 2024-05-08 RX ADMIN — FERROUS SULFATE TAB 325 MG (65 MG ELEMENTAL FE) 325 MG: 325 (65 FE) TAB at 17:40

## 2024-05-08 RX ADMIN — SPIRONOLACTONE 25 MG: 25 TABLET ORAL at 10:23

## 2024-05-08 RX ADMIN — PERFLUTREN 2 ML: 6.52 INJECTION, SUSPENSION INTRAVENOUS at 08:25

## 2024-05-08 ASSESSMENT — PAIN DESCRIPTION - ORIENTATION
ORIENTATION: MID
ORIENTATION: MID

## 2024-05-08 ASSESSMENT — PAIN DESCRIPTION - LOCATION
LOCATION: CHEST
LOCATION: CHEST

## 2024-05-08 ASSESSMENT — PAIN SCALES - GENERAL
PAINLEVEL_OUTOF10: 0
PAINLEVEL_OUTOF10: 6
PAINLEVEL_OUTOF10: 0
PAINLEVEL_OUTOF10: 0
PAINLEVEL_OUTOF10: 3

## 2024-05-08 ASSESSMENT — PAIN - FUNCTIONAL ASSESSMENT: PAIN_FUNCTIONAL_ASSESSMENT: 0-10

## 2024-05-08 ASSESSMENT — PAIN DESCRIPTION - DESCRIPTORS: DESCRIPTORS: PRESSURE

## 2024-05-08 NOTE — ED NOTES
Pt has ambulated x2 to the bathroom without difficulty. He states his CP and dyspnea have improved.

## 2024-05-08 NOTE — H&P
release tablet 40 mEq, Oral, DAILY WITH BREAKFAST    sacubitril-valsartan (ENTRESTO) 49-51 MG per tablet 1 tablet, Oral, 2 TIMES DAILY    spironolactone (ALDACTONE) 25 mg, Oral, DAILY        REVIEWOFSYSTEMS    (positive findings are in BOLD; negative findings are in regular font)  Constitutional: fevers, chills, appetite changes, weight changes, fatigue  HEENT: changes in vision, changes in hearing, sore throat, dysphagia  Cardiovascular: chest pain, palpitations, PND, orthopnea, edema  Pulmonary: SOB, cough, sputum production, wheezing, chest tightness  Gastrointestinal: abdominal pain, nausea/vomiting, diarrhea, constipation, melena, hematochezia  Genitourinary: dysuria, hesitation, dribbling, urgency, hematuria  Musculoskeletal: arthralgias, myalgias  Skin: rash, itching  Neurological: sensory changes, motor changes, headache  Psychiatric: mood changes  Endocrine: heat/cold intolerance  Heme: easy bruising/easy bleeding, LAD     OBJECTIVE:  Patient Vitals for the past 24 hrs:   BP Temp Temp src Pulse Resp SpO2 Height Weight   05/08/24 0630 -- -- -- 70 16 -- -- --   05/08/24 0615 (!) 173/137 -- -- 71 28 99 % -- --   05/08/24 0600 (!) 175/121 -- -- 76 27 100 % -- --   05/08/24 0545 (!) 169/116 -- -- 70 11 97 % -- --   05/08/24 0530 (!) 171/122 -- -- 67 25 98 % -- --   05/08/24 0515 (!) 165/113 -- -- 68 -- 99 % -- --   05/08/24 0500 (!) 169/110 -- -- 76 25 95 % -- --   05/08/24 0445 (!) 168/122 -- -- 77 21 90 % -- --   05/08/24 0430 (!) 172/134 -- -- 72 28 95 % -- --   05/08/24 0415 (!) 168/123 -- -- 72 25 94 % -- --   05/08/24 0400 (!) 162/132 -- -- 75 29 95 % -- --   05/08/24 0345 (!) 167/125 -- -- 69 -- 99 % -- --   05/08/24 0330 (!) 177/113 -- -- 71 21 98 % -- --   05/08/24 0300 (!) 161/126 -- -- 70 24 100 % -- --   05/08/24 0245 (!) 157/119 -- -- 72 30 97 % -- --   05/08/24 0241 -- 98.3 °F (36.8 °C) Oral -- -- -- -- --   05/08/24 0240 -- -- -- -- -- -- -- 65.8 kg (145 lb)   05/08/24 0232 (!) 156/119 -- -- 65 18  95 % 1.702 m (5' 7\") --     Body mass index is 22.71 kg/m².      PHYSICAL EXAM:  PHYSICALEXAM    General: in no apparent distress.  HEENT: NCAT, EOM intact; oral mucosa well perfused  Neck: supple, normal ROM, no JVD   CVS: regular rate and rhythm, S1, S2 normal, no murmur, click, rub or gallop  Lungs: chest clear, no wheezing, rales, normal symmetric air entry    Abdomen: Soft, non-distended, non-TTP, no palpable organomegaly or masses  Ext: No calf tenderness, peripheral pulses present, no significant edema.  Skin: warm, dry, intact, no significant rashes/petechia/ecchymosis appreciated on exposed skin   Neuro: No focal neurologic deficits or gross abnormalities  Psych: A&Ox3, appropriate mood and affect     RECENT LABS ON ADMISSION   Recent Results (from the past 24 hour(s))   CBC with Auto Differential    Collection Time: 05/08/24  2:49 AM   Result Value Ref Range    WBC 4.5 (L) 4.6 - 13.2 K/uL    RBC 4.09 (L) 4.35 - 5.65 M/uL    Hemoglobin 11.6 (L) 13.0 - 16.0 g/dL    Hematocrit 35.1 (L) 36.0 - 48.0 %    MCV 85.8 78.0 - 100.0 FL    MCH 28.4 24.0 - 34.0 PG    MCHC 33.0 31.0 - 37.0 g/dL    RDW 13.3 11.6 - 14.5 %    Platelets 224 135 - 420 K/uL    MPV 10.3 9.2 - 11.8 FL    Nucleated RBCs 0.0 0  WBC    nRBC 0.00 0.00 - 0.01 K/uL    Neutrophils % 44 40 - 73 %    Lymphocytes % 41 21 - 52 %    Monocytes % 14 (H) 3 - 10 %    Eosinophils % 1 0 - 5 %    Basophils % 0 0 - 2 %    Immature Granulocytes % 0 0.0 - 0.5 %    Neutrophils Absolute 1.9 1.8 - 8.0 K/UL    Lymphocytes Absolute 1.8 0.9 - 3.6 K/UL    Monocytes Absolute 0.6 0.05 - 1.2 K/UL    Eosinophils Absolute 0.0 0.0 - 0.4 K/UL    Basophils Absolute 0.0 0.0 - 0.1 K/UL    Immature Granulocytes Absolute 0.0 0.00 - 0.04 K/UL    Differential Type AUTOMATED     Comprehensive Metabolic Panel w/ Reflex to MG    Collection Time: 05/08/24  2:49 AM   Result Value Ref Range    Sodium 141 136 - 145 mmol/L    Potassium 4.5 3.5 - 5.5 mmol/L    Chloride 112 (H) 100 - 111

## 2024-05-08 NOTE — ED PROVIDER NOTES
EMERGENCY DEPARTMENT HISTORY AND PHYSICAL EXAM    3:54 PM      Date: 5/8/2024  Patient Name: Phong Lucas    History of Presenting Illness     Chief Complaint   Patient presents with    Chest Pain       History From: Patient    Phong Lucas is a 63 y.o. male   HPI  63-year-old with past medical history of heart failure, hyperlipidemia, CAD, CVA, cardiomyopathy, cocaine use who presents short of breath and chest pain.  States that the symptoms started yesterday.  Having dyspnea with exertion.  Patient's chest pain is mild to moderate, center of the chest, pressure-like.  Patient was given aspirin and nitro in the field.  Patient symptoms relief.  When assessing ROS he denies any nausea, vomiting, abdominal pain, change in bowel movement, lower extremity swelling, or any other changes.         Nursing Notes were all reviewed and agreed with or any disagreements were addressed in the HPI.    PCP: Luis Moran MD    Current Facility-Administered Medications   Medication Dose Route Frequency Provider Last Rate Last Admin    sodium chloride flush 0.9 % injection 5-40 mL  5-40 mL IntraVENous 2 times per day Yadi Traylor MD   10 mL at 05/09/24 0907    sodium chloride flush 0.9 % injection 5-40 mL  5-40 mL IntraVENous PRN Yadi Traylor MD        0.9 % sodium chloride infusion   IntraVENous PRN Yadi Traylor MD        potassium chloride (KLOR-CON M) extended release tablet 40 mEq  40 mEq Oral PRN Yadi Traylor MD   40 mEq at 05/09/24 0907    Or    potassium bicarb-citric acid (EFFER-K) effervescent tablet 40 mEq  40 mEq Oral PRN Yadi Traylor MD        Or    potassium chloride 10 mEq/100 mL IVPB (Peripheral Line)  10 mEq IntraVENous PRN Yadi Traylor MD        magnesium sulfate 2000 mg in 50 mL IVPB premix  2,000 mg IntraVENous PRN Yadi Traylor MD        enoxaparin (LOVENOX) injection 40 mg  40 mg SubCUTAneous Daily Yadi Traylor MD   40 mg at 05/09/24 0903    ondansetron

## 2024-05-08 NOTE — CARE COORDINATION
05/08/24 0912   Service Assessment   Patient Orientation Alert and Oriented   Cognition Alert   History Provided By Patient   Primary Caregiver Self   Accompanied By/Relationship none   Support Systems Family Members   Patient's Healthcare Decision Maker is: Legal Next of Kin   PCP Verified by CM Yes   Last Visit to PCP Within last 6 months   Prior Functional Level Independent in ADLs/IADLs   Current Functional Level Independent in ADLs/IADLs   Can patient return to prior living arrangement Yes   Ability to make needs known: Good   Family able to assist with home care needs: Yes   Would you like for me to discuss the discharge plan with any other family members/significant others, and if so, who? Yes   Financial Resources Medicaid   Community Resources None   Social/Functional History   Lives With Family  (niece)   Type of Home House   Home Layout One level   Home Access Stairs to enter with rails   Entrance Stairs - Number of Steps 3 steps   Entrance Stairs - Rails Right   Bathroom Shower/Tub Tub/Shower unit   Bathroom Toilet Standard   Bathroom Equipment None   Bathroom Accessibility Accessible   Home Equipment None   Receives Help From Family   ADL Assistance Independent   Homemaking Assistance Independent   Homemaking Responsibilities Yes   Ambulation Assistance Independent   Transfer Assistance Independent   Active  No   Mode of Transportation Other  (Bike)   Occupation Unemployed   Discharge Planning   Type of Residence House   Living Arrangements Family Members   Current Services Prior To Admission None   Potential Assistance Needed N/A   DME Ordered? No   Potential Assistance Purchasing Medications No   Type of Home Care Services None   Patient expects to be discharged to: House   History of falls? 0   Services At/After Discharge   Transition of Care Consult (CM Consult) Discharge Planning   Services At/After Discharge None   Mode of Transport at Discharge Other (see comment)  (Sister will ryan)

## 2024-05-08 NOTE — PROGRESS NOTES
completed the initial Spiritual Assessment of the patient, and offered Pastoral Care support to the patient in bed 6 of the emergency room where he will be admitted to the hospital from. Patient does not seem to be involved in a Faith. There is no advance directive on file .Patient does not have any Yazidism/cultural needs that will affect patient’s preferences in health care. Chaplains will continue to follow and will provide pastoral care on an as needed/requested basis.    Chaplain Abrahan Prieto  Board Certified   Spiritual Care Department  [phone]

## 2024-05-08 NOTE — ED TRIAGE NOTES
EMS reports Pt called 911 d/t a few days of CP described as pressure. CP worsened today and he also developed dyspnea. SpO2 on RA was 96%. EMS administered x2NTG SL and 324 mg of ASA, which improved his pain. Pt notes he has been intermittently non-compliant with his meds. Pt denies n/v. Pt has know h/o CHF.

## 2024-05-09 VITALS
HEIGHT: 67 IN | OXYGEN SATURATION: 96 % | DIASTOLIC BLOOD PRESSURE: 80 MMHG | RESPIRATION RATE: 20 BRPM | TEMPERATURE: 98.2 F | HEART RATE: 67 BPM | BODY MASS INDEX: 22.76 KG/M2 | SYSTOLIC BLOOD PRESSURE: 140 MMHG | WEIGHT: 145 LBS

## 2024-05-09 LAB
ANION GAP SERPL CALC-SCNC: 8 MMOL/L (ref 3–18)
BASOPHILS # BLD: 0 K/UL (ref 0–0.1)
BASOPHILS NFR BLD: 0 % (ref 0–2)
BUN SERPL-MCNC: 22 MG/DL (ref 7–18)
BUN/CREAT SERPL: 12 (ref 12–20)
CALCIUM SERPL-MCNC: 8.2 MG/DL (ref 8.5–10.1)
CHLORIDE SERPL-SCNC: 104 MMOL/L (ref 100–111)
CO2 SERPL-SCNC: 23 MMOL/L (ref 21–32)
CREAT SERPL-MCNC: 1.79 MG/DL (ref 0.6–1.3)
DIFFERENTIAL METHOD BLD: ABNORMAL
EOSINOPHIL # BLD: 0 K/UL (ref 0–0.4)
EOSINOPHIL NFR BLD: 1 % (ref 0–5)
ERYTHROCYTE [DISTWIDTH] IN BLOOD BY AUTOMATED COUNT: 13.1 % (ref 11.6–14.5)
GLUCOSE SERPL-MCNC: 191 MG/DL (ref 74–99)
HCT VFR BLD AUTO: 34.2 % (ref 36–48)
HGB BLD-MCNC: 11.5 G/DL (ref 13–16)
IMM GRANULOCYTES # BLD AUTO: 0 K/UL (ref 0–0.04)
IMM GRANULOCYTES NFR BLD AUTO: 0 % (ref 0–0.5)
LYMPHOCYTES # BLD: 1.7 K/UL (ref 0.9–3.6)
LYMPHOCYTES NFR BLD: 35 % (ref 21–52)
MAGNESIUM SERPL-MCNC: 1.6 MG/DL (ref 1.6–2.6)
MCH RBC QN AUTO: 28 PG (ref 24–34)
MCHC RBC AUTO-ENTMCNC: 33.6 G/DL (ref 31–37)
MCV RBC AUTO: 83.4 FL (ref 78–100)
MONOCYTES # BLD: 0.7 K/UL (ref 0.05–1.2)
MONOCYTES NFR BLD: 14 % (ref 3–10)
NEUTS SEG # BLD: 2.5 K/UL (ref 1.8–8)
NEUTS SEG NFR BLD: 51 % (ref 40–73)
NRBC # BLD: 0 K/UL (ref 0–0.01)
NRBC BLD-RTO: 0 PER 100 WBC
PLATELET # BLD AUTO: 222 K/UL (ref 135–420)
PMV BLD AUTO: 10.7 FL (ref 9.2–11.8)
POTASSIUM SERPL-SCNC: 3.5 MMOL/L (ref 3.5–5.5)
RBC # BLD AUTO: 4.1 M/UL (ref 4.35–5.65)
SODIUM SERPL-SCNC: 135 MMOL/L (ref 136–145)
WBC # BLD AUTO: 4.8 K/UL (ref 4.6–13.2)

## 2024-05-09 PROCEDURE — 83735 ASSAY OF MAGNESIUM: CPT

## 2024-05-09 PROCEDURE — 36415 COLL VENOUS BLD VENIPUNCTURE: CPT

## 2024-05-09 PROCEDURE — 2580000003 HC RX 258

## 2024-05-09 PROCEDURE — 6360000002 HC RX W HCPCS

## 2024-05-09 PROCEDURE — 99222 1ST HOSP IP/OBS MODERATE 55: CPT | Performed by: INTERNAL MEDICINE

## 2024-05-09 PROCEDURE — 6370000000 HC RX 637 (ALT 250 FOR IP)

## 2024-05-09 PROCEDURE — 6370000000 HC RX 637 (ALT 250 FOR IP): Performed by: PHYSICIAN ASSISTANT

## 2024-05-09 PROCEDURE — 94761 N-INVAS EAR/PLS OXIMETRY MLT: CPT

## 2024-05-09 PROCEDURE — 85025 COMPLETE CBC W/AUTO DIFF WBC: CPT

## 2024-05-09 PROCEDURE — 80048 BASIC METABOLIC PNL TOTAL CA: CPT

## 2024-05-09 RX ORDER — ATORVASTATIN CALCIUM 40 MG/1
40 TABLET, FILM COATED ORAL
Qty: 90 TABLET | Refills: 0 | Status: SHIPPED | OUTPATIENT
Start: 2024-05-09 | End: 2024-08-07

## 2024-05-09 RX ORDER — CARVEDILOL 6.25 MG/1
6.25 TABLET ORAL 2 TIMES DAILY WITH MEALS
Qty: 60 TABLET | Refills: 2 | Status: SHIPPED | OUTPATIENT
Start: 2024-05-09 | End: 2024-08-07

## 2024-05-09 RX ORDER — POTASSIUM CHLORIDE 20 MEQ/1
40 TABLET, EXTENDED RELEASE ORAL
Qty: 60 TABLET | Refills: 3 | Status: SHIPPED | OUTPATIENT
Start: 2024-05-09

## 2024-05-09 RX ORDER — ISOSORBIDE MONONITRATE 60 MG/1
60 TABLET, EXTENDED RELEASE ORAL DAILY
Qty: 30 TABLET | Refills: 2 | Status: SHIPPED | OUTPATIENT
Start: 2024-05-09 | End: 2024-08-07

## 2024-05-09 RX ORDER — AMLODIPINE BESYLATE 2.5 MG/1
2.5 TABLET ORAL DAILY
Qty: 30 TABLET | Refills: 3 | Status: SHIPPED | OUTPATIENT
Start: 2024-05-09

## 2024-05-09 RX ORDER — ASPIRIN 81 MG/1
81 TABLET, CHEWABLE ORAL DAILY
Qty: 30 TABLET | Refills: 2 | Status: SHIPPED | OUTPATIENT
Start: 2024-05-09 | End: 2024-08-07

## 2024-05-09 RX ORDER — UREA 10 %
325 LOTION (ML) TOPICAL 2 TIMES DAILY
Qty: 90 TABLET | Refills: 3 | Status: SHIPPED | OUTPATIENT
Start: 2024-05-09

## 2024-05-09 RX ORDER — HYDRALAZINE HYDROCHLORIDE 25 MG/1
25 TABLET, FILM COATED ORAL EVERY 8 HOURS SCHEDULED
Qty: 90 TABLET | Refills: 3 | Status: CANCELLED | OUTPATIENT
Start: 2024-05-09

## 2024-05-09 RX ORDER — SPIRONOLACTONE 25 MG/1
25 TABLET ORAL DAILY
Qty: 30 TABLET | Refills: 2 | Status: SHIPPED | OUTPATIENT
Start: 2024-05-09 | End: 2024-08-07

## 2024-05-09 RX ORDER — FUROSEMIDE 40 MG/1
40 TABLET ORAL 2 TIMES DAILY
Qty: 60 TABLET | Refills: 2 | Status: SHIPPED | OUTPATIENT
Start: 2024-05-09 | End: 2024-08-07

## 2024-05-09 RX ADMIN — HYDRALAZINE HYDROCHLORIDE 25 MG: 25 TABLET ORAL at 06:17

## 2024-05-09 RX ADMIN — ENOXAPARIN SODIUM 40 MG: 40 INJECTION SUBCUTANEOUS at 09:03

## 2024-05-09 RX ADMIN — FERROUS SULFATE TAB 325 MG (65 MG ELEMENTAL FE) 325 MG: 325 (65 FE) TAB at 16:46

## 2024-05-09 RX ADMIN — ASPIRIN 81 MG CHEWABLE TABLET 81 MG: 81 TABLET CHEWABLE at 09:03

## 2024-05-09 RX ADMIN — CARVEDILOL 6.25 MG: 6.25 TABLET, FILM COATED ORAL at 16:46

## 2024-05-09 RX ADMIN — CARVEDILOL 6.25 MG: 6.25 TABLET, FILM COATED ORAL at 09:06

## 2024-05-09 RX ADMIN — FUROSEMIDE 40 MG: 10 INJECTION, SOLUTION INTRAMUSCULAR; INTRAVENOUS at 09:07

## 2024-05-09 RX ADMIN — ISOSORBIDE MONONITRATE 60 MG: 60 TABLET, EXTENDED RELEASE ORAL at 09:03

## 2024-05-09 RX ADMIN — SPIRONOLACTONE 25 MG: 25 TABLET ORAL at 09:06

## 2024-05-09 RX ADMIN — AMLODIPINE BESYLATE 2.5 MG: 5 TABLET ORAL at 09:04

## 2024-05-09 RX ADMIN — SODIUM CHLORIDE, PRESERVATIVE FREE 10 ML: 5 INJECTION INTRAVENOUS at 09:07

## 2024-05-09 RX ADMIN — FUROSEMIDE 40 MG: 10 INJECTION, SOLUTION INTRAMUSCULAR; INTRAVENOUS at 16:46

## 2024-05-09 RX ADMIN — FERROUS SULFATE TAB 325 MG (65 MG ELEMENTAL FE) 325 MG: 325 (65 FE) TAB at 09:03

## 2024-05-09 RX ADMIN — HYDRALAZINE HYDROCHLORIDE 25 MG: 25 TABLET ORAL at 14:02

## 2024-05-09 RX ADMIN — POTASSIUM CHLORIDE 40 MEQ: 1500 TABLET, EXTENDED RELEASE ORAL at 09:07

## 2024-05-09 ASSESSMENT — PAIN SCALES - GENERAL
PAINLEVEL_OUTOF10: 0

## 2024-05-09 NOTE — PROGRESS NOTES
Cardiology Progress Note    Admit Date: 5/8/2024  Attending Cardiologist: Dr. Giraldo    IMPRESSION  Possibly cardiogenic chest pain rule out MI, non-anginal pains on interview  Acute on chronic heart failure reduced ejection fraction  Chronically elevated troponin, EKG 5/8/2024 normal sinus rhythm, left atrial enlargement, left axis deviation, anterior infarct,, left heart catheterization March 2023 without significant coronary artery disease minimal luminal regularities  Nonischemic cardiomyopathy ejection fraction 20 to 30% from September 2022, 2D echo from 5/8/2024 ejection fraction 22%,  Severe pulmonary hypertension by 2D echo 5/8/2024  Hypertension - Stage II pressure  Hyperlipidemia  History of CVA  Polysubstance abuse    PLAN  Monitor fluid status, adjust as necessary, fluid restriction, salt intake <2g per day.,  Reinforced compliance with diet, lasix 40mg po BID at home.    Recommend Guideline Directed medical therapy as can tolerate.  Home medications included Coreg 6.25 mg p.o. twice daily, Jardiance 10 mg p.o. daily, Imdur 60 mg p.o. daily, Entresto 49-51 mg p.o. twice daily, Aldactone 25 mg p.o. daily, would recommend restart prior dosing  Monitor blood pressure, adjust antihypertensive medications as necessary.  Continue Norvasc 2.5 mg p.o. daily  Statin if can tolerate prior to discharge.  Followup with Dr. AYANNA Yanez  Signing off patient at this time.  Please call for questions.    Thank you for this consultation and for allowing us to participate in this patient's care.    Primary Cardiologist Dr. AYANNA Yanez    Subjective:     Denies chest pain  Denies shortness of breath  Denies abdominal pain    Objective:      Patient Vitals for the past 8 hrs:   Temp Pulse Resp BP SpO2   05/09/24 0803 -- 67 -- (!) 159/100 --   05/09/24 0801 98.5 °F (36.9 °C) 69 18 (!) 164/106 99 %   05/09/24 0615 98.4 °F (36.9 °C) 68 16 (!) 150/90 --   05/09/24 0330 98.3 °F (36.8 °C) 65 18 (!) 155/102 --         Patient Vitals for

## 2024-05-09 NOTE — PLAN OF CARE
Problem: Pain  Goal: Verbalizes/displays adequate comfort level or baseline comfort level  5/9/2024 1157 by Malu Salomon RN  Outcome: Progressing  5/9/2024 0658 by Mariia Crespo RN  Outcome: Progressing     Problem: Safety - Adult  Goal: Free from fall injury  5/9/2024 1157 by Malu Salomon RN  Outcome: Progressing  5/9/2024 0658 by Mariia Crespo RN  Outcome: Progressing     Problem: Chronic Conditions and Co-morbidities  Goal: Patient's chronic conditions and co-morbidity symptoms are monitored and maintained or improved  Outcome: Progressing  Flowsheets (Taken 5/9/2024 0720)  Care Plan - Patient's Chronic Conditions and Co-Morbidity Symptoms are Monitored and Maintained or Improved: Monitor and assess patient's chronic conditions and comorbid symptoms for stability, deterioration, or improvement

## 2024-05-09 NOTE — CONSULTS
Comprehensive Nutrition Assessment    Type and Reason for Visit:  Initial, Consult, Patient Education    Nutrition Recommendations/Plan:   Diet as ordered  Continue to monitor tolerance of PO, weight, labs, and plan of care during admission.      Recommend pursue outpatient nutrition counseling (on Heart healthy diet) for reinforcement      Malnutrition Assessment:  Malnutrition Status:  At risk for malnutrition (Comment) (r/t CHF status) (05/09/24 1333)    Context:  Acute Illness     Findings of the 6 clinical characteristics of malnutrition:  Energy Intake:  No significant decrease in energy intake  Weight Loss:  No significant weight loss     Body Fat Loss:  Mild body fat loss Triceps   Muscle Mass Loss:  Mild muscle mass loss Temples (temporalis), Thigh (quadriceps)  Fluid Accumulation:  No significant fluid accumulation     Strength:  Normal  strength    Nutrition History and Allergies:   PMHx of HFrEF, cardiomyopathy, HTN, COPD, tobacco use, cocaine use, CKD, h/o CVA x2. Wt hx: 69.2 kg 5/2/23, 68 kg 10/10/23, 65.8 kg 3/24/24 - noted 4.9% (~4kg) x 1 yr. Pt reports some wt loss over the past year d/t \"not eating properly\", but states he has adequate access to food at home. [facility]    Nutrition Assessment:    Phong Lucas is a 63 y.o. male  admitted on 5/8 for Chest Pain  Current on 4N Medical floor.   Appetite good per pt. Observed tray at bedside finished %.   Some wt loss noted since admission, but insignificant: -0.5% (0.3kg).  Pt seen per consult for diet education  Educated on CHF diet  Learners: Patient and pt sister  Readiness: Acceptance  Method: Explanation and Handout  Response: Verbalizes Understanding and Needs Reinforcement    Nutrition Related Findings:    Pertinent Meds: lipitor, coreg, Fesulfate, lasix, spironolactone. Pertinent Labs: Na 135 (trending down), eGFR 42, troponin 387, Glu 191 Wound Type: None     Last BM (including prior to admit): 05/06/24  Edema: None     Current 
TAPSE is 1.7 cm.    Left Atrium: Left atrium size is normal. Left atrial volume index is normal (16-34 mL/m2). LA Vol Index A/L is 32 mL/m2.    Signed by: Chito Giraldo MD on 6/7/2023 12:40 PM    No results found for this or any previous visit.    03/09/23    CARDIAC PROCEDURE 03/10/2023  1:22 PM (Final)    Conclusion  Left dominant coronary circulation  Left main normal  LAD: Minimal luminal irregularity  LCx: Large and dominant, minimal luminal regularities, OM: Large and dominant, LPDA normal  RCA: Mild diffuse luminal irregularities, small nondominant vessel  LVEDP 4-6 mmHg  LVEF mild is moderately reduced    Signed by: Yobani LAKHANI MD on 3/10/2023  1:22 PM    Xray Result (most recent):  XR CHEST PORTABLE 05/08/2024    Narrative  XR CHEST PORTABLE      INDICATION: Chest Pain.    TECHNIQUE:   Portable CXR    Comparison March 24, 2024.    FINDINGS:  Stable mild cardiomegaly and vascular congestion. No consolidation. No pleural  effusion..    Impression  1. Chronic or recurrent mild vascular congestion.      Signed By: Janay Gomez PA-C     May 8, 2024

## 2024-05-09 NOTE — PROGRESS NOTES
[facility]  DAILY PROGRESS NOTE      Patient:    Phong Lucas , 63 y.o. male   MRN:  [id number]  Room/Bed:  470/01  Admission Date:   5/8/2024  Code status:  Full Code    Reason for Admission: CP and SOB   Barriers to Discharge: currently d/c planning   Anticipated Date of Discharge: 5/9      ASSESSMENT AND PLAN:     SOB and Chest Pain, resolved   HFrEF (22%)  HLD  Hx CVA  HTN  -more likely d/t vasospastic angina or demand ischemia in the setting of uncontrolled BP and cocaine use   -Patient reports SOB and CP over the last few days which improved after receiving Nitroglycerin in route to hospital. Known hx of polysubstance abuse and UDS+ for cocaine  -Euvolemic on exam so less likely acute CHF exacerbation.   -Troponin level is elevated (404 > 387) but at baseline, so less likely acute NSTEMI.    -BNP 30,00 which is at patient's baseline  -[facility] 3/10/2023 without significant CAD   -CXR: stable mild cardiomegaly and vascular congestion w/ no acute processes  -EKG x 2: Final results show NSR w/ possible L atrial enlargement, L axis deviation, and old Anterior infarct; findings were c/w past EKG findings   - BP 160s - 170s/ 100s - 130s  on admission, now better managed on home meds   - Echo 5/8 showed EF 22%, stable from echo from June 2023  - Home Meds: Amlodipine 2.5 mg daily, Aspirin 81 mg daily, Atorvastatin 40 mg nightly, Carvedilol 6.25 mg BID, Entresto 49 - 51 BID, Lasix 40 mg BID, Isosorbide Mononitrate ER 60 mg daily, Spironolactone 25 mg daily  -of note patient was also prescribed Jardiance 10 mg daily however ran out of it and needs refill upon d/c  -patient initially made NPO however no procedure plans by cards so switched to normal diet   Plan:  -Tele  -Cardiology following, appreciate recs  -Hold Entresto and Jardiance while on IV diuresis, per Cardiology  -continue all other home meds  -home dose Lasix being given IV BID, per cards  -will refill Jardiance upon d/c      CKD3  -elevated BUN  Lymphocytes Absolute 1.7 0.9 - 3.6 K/UL    Monocytes Absolute 0.7 0.05 - 1.2 K/UL    Eosinophils Absolute 0.0 0.0 - 0.4 K/UL    Basophils Absolute 0.0 0.0 - 0.1 K/UL    Immature Granulocytes Absolute 0.0 0.00 - 0.04 K/UL    Differential Type AUTOMATED     Magnesium    Collection Time: 05/09/24  4:30 AM   Result Value Ref Range    Magnesium 1.6 1.6 - 2.6 mg/dL       IMAGING AND PROCEDURES (LAST 24 HOURS)  XR CHEST PORTABLE    Result Date: 5/8/2024  1. Chronic or recurrent mild vascular congestion.       ================================================================  Further management for Mr. Phong Lucas will be discussed on rounds with my attending.      Yadi Traylor MD, PGY-1  [facility]  May 9, 2024 12:26 PM

## 2024-05-09 NOTE — DISCHARGE SUMMARY
[facility]  DISCHARGE SUMMARY      Name:   Phong Lucas 63 y.o. male  MRN:   [id number]  CSN:   [id number]  Admission Date:  5/8/2024  Discharge Date:  5/9/24  Attending:             No att. providers found   PCP:              Luis Moran MD   ================================================================  Reason for Admission:  Chest pain [R07.9]  NSTEMI (non-ST elevated myocardial infarction) (HCC) [I21.4]  Acute on chronic systolic congestive heart failure (HCC) [I50.23]  Chest pain, unspecified type [R07.9]    Discharge Diagnosis:    SOB and Chest Pain, resolved   HFrEF (22%)  HLD  Hx CVA  HTN  CKD3  Polysubstance use  Normocytic Anemia?    Follow-up studies/evaluations for PCP/Important Notes to PCP:  Please discuss cocaine/polysubstance cessation.   Please make sure patient has FU w/ Cardiologist Dr. Yanez  Pending labs/investigations to follow up as below  Medication reconciliation:  Discontinued Medications: none  New Medications: none    VIGNESH Follow Up Appointment:   Follow-up Information       Follow up With Specialties Details Why Contact Info    [facility] - Dr. Guadarrama  Follow up in 4 day(s) 2:40 PM appointment [address], [place] [address] [phone]             Readmission prevention plan:   Patient advised to d/c cocaine.   Patient discharged on appropriate GDMT medications     GOALS OF CARE (including Code Status, Advanced Care Plan):   FULL CODE    Pending labs/ investigations at discharge to follow up:   None    Operative Procedures:   None    Consultants:    Cardiology     Condition at discharge: Stable    Disposition at Discharge:  Home    Functional Status at Discharge: Ambulates without assistance     Diet: Regular diet      Discharge Medications:     Medication List        CONTINUE taking these medications      amLODIPine 2.5 MG tablet  Commonly known as: NORVASC  Take 1 tablet by mouth daily     aspirin 81 MG chewable tablet  Take 1  refilled upon D/C  -Tele - no abnormal rhythms reported  -Cardiology followed, recs appreciated  -home dose Lasix was given IV BID, per cards  -fluid restricted to 1800 mL per Cardiology       CKD3  -elevated BUN (24) and Cr (1.79), and low GFR (42) on admission, all at baseline  -avoided nephrotoxic agents   -monitored BMP        Polysubstance use  -UDS+ for cocaine this admission   -tobacco use on/off for 30 years, occasional alcohol use, regular cocaine use, known longterm hx of substance abuse   -cessation advised         Normocytic Anemia?  -Continued on Home rx: ferrous sulfate 325 mg PO BID  -Iron studies (4/17/23) with low iron/normal ferritin/normal TIBC       Pertinent Results:      CURRENT ADMISSION IMAGING RESULTS        Xray Result (most recent):  XR CHEST PORTABLE 05/08/2024    Narrative  XR CHEST PORTABLE      INDICATION: Chest Pain.    TECHNIQUE:   Portable CXR    Comparison March 24, 2024.    FINDINGS:  Stable mild cardiomegaly and vascular congestion. No consolidation. No pleural  effusion..    Impression  1. Chronic or recurrent mild vascular congestion.        Cardiology Procedures/Testing:  MODALITY RESULTS   EKG Encounter Date: 05/08/24   EKG 12 Lead   Result Value    Ventricular Rate 67    Atrial Rate 67    P-R Interval 186    QRS Duration 92    Q-T Interval 444    QTc Calculation (Bazett) 469    P Axis 64    R Axis -39    T Axis -53    Diagnosis      Normal sinus rhythm  Possible Left atrial enlargement  Left axis deviation  Anterior infarct (cited on or before 24-MAR-2024)  Abnormal ECG  When compared with ECG of 08-MAY-2024 02:34,  No significant change was found  Confirmed by MD Arturo, Man ([id number]) on 5/8/2024 1:01:44 PM        ECHO 05/08/24    ECHO (TTE) LIMITED (PRN CONTRAST/BUBBLE/STRAIN/3D) 05/08/2024 12:41 PM (Final)    Interpretation Summary    Left Ventricle: Severely reduced left ventricular systolic function. EF by 2D Simpsons Biplane is 22%. Left ventricle size is normal. Normal

## 2024-06-08 PROBLEM — R79.89 ELEVATED TROPONIN: Status: RESOLVED | Noted: 2023-03-09 | Resolved: 2024-06-08

## 2024-08-08 ENCOUNTER — OFFICE VISIT (OUTPATIENT)
Age: 64
End: 2024-08-08

## 2024-08-08 VITALS
HEART RATE: 52 BPM | SYSTOLIC BLOOD PRESSURE: 132 MMHG | HEIGHT: 67 IN | WEIGHT: 145.4 LBS | DIASTOLIC BLOOD PRESSURE: 82 MMHG | OXYGEN SATURATION: 97 % | BODY MASS INDEX: 22.82 KG/M2

## 2024-08-08 DIAGNOSIS — I42.0 DILATED CARDIOMYOPATHY (HCC): Primary | ICD-10-CM

## 2024-08-08 DIAGNOSIS — E78.00 PURE HYPERCHOLESTEROLEMIA: ICD-10-CM

## 2024-08-08 DIAGNOSIS — I10 ESSENTIAL HYPERTENSION WITH GOAL BLOOD PRESSURE LESS THAN 140/90: ICD-10-CM

## 2024-08-08 ASSESSMENT — PATIENT HEALTH QUESTIONNAIRE - PHQ9
1. LITTLE INTEREST OR PLEASURE IN DOING THINGS: NOT AT ALL
SUM OF ALL RESPONSES TO PHQ QUESTIONS 1-9: 0
2. FEELING DOWN, DEPRESSED OR HOPELESS: NOT AT ALL
SUM OF ALL RESPONSES TO PHQ9 QUESTIONS 1 & 2: 0
SUM OF ALL RESPONSES TO PHQ QUESTIONS 1-9: 0
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
## 2024-09-25 ENCOUNTER — APPOINTMENT (OUTPATIENT)
Facility: HOSPITAL | Age: 64
DRG: 194 | End: 2024-09-25
Payer: MEDICAID

## 2024-09-25 ENCOUNTER — HOSPITAL ENCOUNTER (INPATIENT)
Facility: HOSPITAL | Age: 64
LOS: 2 days | Discharge: HOME | DRG: 194 | End: 2024-09-27
Attending: FAMILY MEDICINE | Admitting: FAMILY MEDICINE
Payer: MEDICAID

## 2024-09-25 DIAGNOSIS — I50.21 ACUTE HFREF (HEART FAILURE WITH REDUCED EJECTION FRACTION) (HCC): ICD-10-CM

## 2024-09-25 PROBLEM — I50.20 HFREF (HEART FAILURE WITH REDUCED EJECTION FRACTION) (HCC): Status: ACTIVE | Noted: 2024-09-25

## 2024-09-25 LAB
ALBUMIN SERPL-MCNC: 3.2 G/DL (ref 3.4–5)
ALBUMIN/GLOB SERPL: 0.8 (ref 0.8–1.7)
ALP SERPL-CCNC: 102 U/L (ref 45–117)
ALT SERPL-CCNC: 64 U/L (ref 16–61)
ANION GAP SERPL CALC-SCNC: 8 MMOL/L (ref 3–18)
AST SERPL-CCNC: 63 U/L (ref 10–38)
BASOPHILS # BLD: 0 K/UL (ref 0–0.1)
BASOPHILS NFR BLD: 0 % (ref 0–2)
BILIRUB SERPL-MCNC: 0.6 MG/DL (ref 0.2–1)
BUN SERPL-MCNC: 23 MG/DL (ref 7–18)
BUN/CREAT SERPL: 14 (ref 12–20)
CALCIUM SERPL-MCNC: 8.9 MG/DL (ref 8.5–10.1)
CHLORIDE SERPL-SCNC: 108 MMOL/L (ref 100–111)
CO2 SERPL-SCNC: 24 MMOL/L (ref 21–32)
CREAT SERPL-MCNC: 1.66 MG/DL (ref 0.6–1.3)
DIFFERENTIAL METHOD BLD: ABNORMAL
EOSINOPHIL # BLD: 0 K/UL (ref 0–0.4)
EOSINOPHIL NFR BLD: 0 % (ref 0–5)
ERYTHROCYTE [DISTWIDTH] IN BLOOD BY AUTOMATED COUNT: 13.3 % (ref 11.6–14.5)
GLOBULIN SER CALC-MCNC: 4.2 G/DL (ref 2–4)
GLUCOSE SERPL-MCNC: 146 MG/DL (ref 74–99)
HCT VFR BLD AUTO: 37.4 % (ref 36–48)
HGB BLD-MCNC: 12.2 G/DL (ref 13–16)
IMM GRANULOCYTES # BLD AUTO: 0 K/UL (ref 0–0.04)
IMM GRANULOCYTES NFR BLD AUTO: 0 % (ref 0–0.5)
LYMPHOCYTES # BLD: 1.2 K/UL (ref 0.9–3.6)
LYMPHOCYTES NFR BLD: 24 % (ref 21–52)
MAGNESIUM SERPL-MCNC: 1.6 MG/DL (ref 1.6–2.6)
MCH RBC QN AUTO: 27.9 PG (ref 24–34)
MCHC RBC AUTO-ENTMCNC: 32.6 G/DL (ref 31–37)
MCV RBC AUTO: 85.4 FL (ref 78–100)
MONOCYTES # BLD: 0.6 K/UL (ref 0.05–1.2)
MONOCYTES NFR BLD: 12 % (ref 3–10)
NEUTS SEG # BLD: 3.3 K/UL (ref 1.8–8)
NEUTS SEG NFR BLD: 64 % (ref 40–73)
NRBC # BLD: 0 K/UL (ref 0–0.01)
NRBC BLD-RTO: 0 PER 100 WBC
NT PRO BNP: ABNORMAL PG/ML (ref 0–900)
PLATELET # BLD AUTO: 233 K/UL (ref 135–420)
PMV BLD AUTO: 10.7 FL (ref 9.2–11.8)
POTASSIUM SERPL-SCNC: 3.8 MMOL/L (ref 3.5–5.5)
PROT SERPL-MCNC: 7.4 G/DL (ref 6.4–8.2)
RBC # BLD AUTO: 4.38 M/UL (ref 4.35–5.65)
SODIUM SERPL-SCNC: 140 MMOL/L (ref 136–145)
TROPONIN I SERPL HS-MCNC: 269 NG/L (ref 0–78)
TROPONIN I SERPL HS-MCNC: 400 NG/L (ref 0–78)
WBC # BLD AUTO: 5.2 K/UL (ref 4.6–13.2)

## 2024-09-25 PROCEDURE — 1100000000 HC RM PRIVATE

## 2024-09-25 PROCEDURE — 99285 EMERGENCY DEPT VISIT HI MDM: CPT

## 2024-09-25 PROCEDURE — 93005 ELECTROCARDIOGRAM TRACING: CPT | Performed by: FAMILY MEDICINE

## 2024-09-25 PROCEDURE — 80053 COMPREHEN METABOLIC PANEL: CPT

## 2024-09-25 PROCEDURE — 84484 ASSAY OF TROPONIN QUANT: CPT

## 2024-09-25 PROCEDURE — 83735 ASSAY OF MAGNESIUM: CPT

## 2024-09-25 PROCEDURE — 83880 ASSAY OF NATRIURETIC PEPTIDE: CPT

## 2024-09-25 PROCEDURE — 6370000000 HC RX 637 (ALT 250 FOR IP)

## 2024-09-25 PROCEDURE — 85025 COMPLETE CBC W/AUTO DIFF WBC: CPT

## 2024-09-25 PROCEDURE — 71045 X-RAY EXAM CHEST 1 VIEW: CPT

## 2024-09-25 RX ORDER — POTASSIUM CHLORIDE 7.45 MG/ML
10 INJECTION INTRAVENOUS PRN
Status: DISCONTINUED | OUTPATIENT
Start: 2024-09-25 | End: 2024-09-27

## 2024-09-25 RX ORDER — ONDANSETRON 4 MG/1
4 TABLET, ORALLY DISINTEGRATING ORAL EVERY 8 HOURS PRN
Status: DISCONTINUED | OUTPATIENT
Start: 2024-09-25 | End: 2024-09-27 | Stop reason: HOSPADM

## 2024-09-25 RX ORDER — AMLODIPINE BESYLATE 5 MG/1
2.5 TABLET ORAL ONCE
Status: COMPLETED | OUTPATIENT
Start: 2024-09-25 | End: 2024-09-25

## 2024-09-25 RX ORDER — ENOXAPARIN SODIUM 100 MG/ML
40 INJECTION SUBCUTANEOUS DAILY
Status: DISCONTINUED | OUTPATIENT
Start: 2024-09-26 | End: 2024-09-27 | Stop reason: HOSPADM

## 2024-09-25 RX ORDER — SPIRONOLACTONE 25 MG/1
25 TABLET ORAL DAILY
Status: DISCONTINUED | OUTPATIENT
Start: 2024-09-26 | End: 2024-09-27 | Stop reason: HOSPADM

## 2024-09-25 RX ORDER — POLYETHYLENE GLYCOL 3350 17 G/17G
17 POWDER, FOR SOLUTION ORAL DAILY PRN
Status: DISCONTINUED | OUTPATIENT
Start: 2024-09-25 | End: 2024-09-27 | Stop reason: HOSPADM

## 2024-09-25 RX ORDER — SPIRONOLACTONE 25 MG/1
25 TABLET ORAL ONCE
Status: COMPLETED | OUTPATIENT
Start: 2024-09-25 | End: 2024-09-25

## 2024-09-25 RX ORDER — SODIUM CHLORIDE 0.9 % (FLUSH) 0.9 %
5-40 SYRINGE (ML) INJECTION EVERY 12 HOURS SCHEDULED
Status: DISCONTINUED | OUTPATIENT
Start: 2024-09-25 | End: 2024-09-27 | Stop reason: HOSPADM

## 2024-09-25 RX ORDER — SODIUM CHLORIDE 0.9 % (FLUSH) 0.9 %
5-40 SYRINGE (ML) INJECTION PRN
Status: DISCONTINUED | OUTPATIENT
Start: 2024-09-25 | End: 2024-09-27 | Stop reason: HOSPADM

## 2024-09-25 RX ORDER — POTASSIUM CHLORIDE 1500 MG/1
40 TABLET, EXTENDED RELEASE ORAL PRN
Status: DISCONTINUED | OUTPATIENT
Start: 2024-09-25 | End: 2024-09-27

## 2024-09-25 RX ORDER — ACETAMINOPHEN 325 MG/1
650 TABLET ORAL EVERY 6 HOURS PRN
Status: DISCONTINUED | OUTPATIENT
Start: 2024-09-25 | End: 2024-09-27 | Stop reason: HOSPADM

## 2024-09-25 RX ORDER — CARVEDILOL 6.25 MG/1
6.25 TABLET ORAL 2 TIMES DAILY WITH MEALS
Status: DISCONTINUED | OUTPATIENT
Start: 2024-09-26 | End: 2024-09-27 | Stop reason: HOSPADM

## 2024-09-25 RX ORDER — HYDRALAZINE HYDROCHLORIDE 50 MG/1
50 TABLET, FILM COATED ORAL ONCE
Status: COMPLETED | OUTPATIENT
Start: 2024-09-25 | End: 2024-09-25

## 2024-09-25 RX ORDER — ATORVASTATIN CALCIUM 40 MG/1
40 TABLET, FILM COATED ORAL
Status: DISCONTINUED | OUTPATIENT
Start: 2024-09-25 | End: 2024-09-27 | Stop reason: HOSPADM

## 2024-09-25 RX ORDER — FERROUS SULFATE 325(65) MG
325 TABLET ORAL 2 TIMES DAILY
Status: DISCONTINUED | OUTPATIENT
Start: 2024-09-25 | End: 2024-09-27 | Stop reason: HOSPADM

## 2024-09-25 RX ORDER — HYDRALAZINE HYDROCHLORIDE 25 MG/1
25 TABLET, FILM COATED ORAL EVERY 8 HOURS SCHEDULED
Status: DISCONTINUED | OUTPATIENT
Start: 2024-09-26 | End: 2024-09-27 | Stop reason: HOSPADM

## 2024-09-25 RX ORDER — FUROSEMIDE 20 MG
20 TABLET ORAL ONCE
Status: COMPLETED | OUTPATIENT
Start: 2024-09-25 | End: 2024-09-25

## 2024-09-25 RX ORDER — POTASSIUM CHLORIDE 1500 MG/1
40 TABLET, EXTENDED RELEASE ORAL
Status: DISCONTINUED | OUTPATIENT
Start: 2024-09-26 | End: 2024-09-27 | Stop reason: HOSPADM

## 2024-09-25 RX ORDER — ONDANSETRON 2 MG/ML
4 INJECTION INTRAMUSCULAR; INTRAVENOUS EVERY 6 HOURS PRN
Status: DISCONTINUED | OUTPATIENT
Start: 2024-09-25 | End: 2024-09-27 | Stop reason: HOSPADM

## 2024-09-25 RX ORDER — ACETAMINOPHEN 650 MG/1
650 SUPPOSITORY RECTAL EVERY 6 HOURS PRN
Status: DISCONTINUED | OUTPATIENT
Start: 2024-09-25 | End: 2024-09-27 | Stop reason: HOSPADM

## 2024-09-25 RX ORDER — MAGNESIUM SULFATE IN WATER 40 MG/ML
2000 INJECTION, SOLUTION INTRAVENOUS PRN
Status: DISCONTINUED | OUTPATIENT
Start: 2024-09-25 | End: 2024-09-27

## 2024-09-25 RX ORDER — AMLODIPINE BESYLATE 2.5 MG/1
2.5 TABLET ORAL DAILY
Status: DISCONTINUED | OUTPATIENT
Start: 2024-09-26 | End: 2024-09-27 | Stop reason: HOSPADM

## 2024-09-25 RX ORDER — ISOSORBIDE MONONITRATE 60 MG/1
60 TABLET, EXTENDED RELEASE ORAL DAILY
Status: DISCONTINUED | OUTPATIENT
Start: 2024-09-26 | End: 2024-09-27 | Stop reason: HOSPADM

## 2024-09-25 RX ORDER — MIRTAZAPINE 15 MG/1
15 TABLET, FILM COATED ORAL NIGHTLY
Status: DISCONTINUED | OUTPATIENT
Start: 2024-09-26 | End: 2024-09-25

## 2024-09-25 RX ORDER — CARVEDILOL 3.12 MG/1
3.12 TABLET ORAL ONCE
Status: COMPLETED | OUTPATIENT
Start: 2024-09-25 | End: 2024-09-25

## 2024-09-25 RX ORDER — SODIUM CHLORIDE 9 MG/ML
INJECTION, SOLUTION INTRAVENOUS PRN
Status: DISCONTINUED | OUTPATIENT
Start: 2024-09-25 | End: 2024-09-27 | Stop reason: HOSPADM

## 2024-09-25 RX ORDER — ASPIRIN 81 MG/1
81 TABLET, CHEWABLE ORAL DAILY
Status: DISCONTINUED | OUTPATIENT
Start: 2024-09-26 | End: 2024-09-27 | Stop reason: HOSPADM

## 2024-09-25 RX ORDER — FUROSEMIDE 40 MG
40 TABLET ORAL DAILY
Status: DISCONTINUED | OUTPATIENT
Start: 2024-09-26 | End: 2024-09-27 | Stop reason: HOSPADM

## 2024-09-25 RX ADMIN — FUROSEMIDE 20 MG: 20 TABLET ORAL at 17:41

## 2024-09-25 RX ADMIN — SACUBITRIL AND VALSARTAN 1 TABLET: 24; 26 TABLET, FILM COATED ORAL at 17:41

## 2024-09-25 RX ADMIN — CARVEDILOL 3.12 MG: 3.12 TABLET, FILM COATED ORAL at 17:41

## 2024-09-25 RX ADMIN — HYDRALAZINE HYDROCHLORIDE 50 MG: 50 TABLET ORAL at 17:41

## 2024-09-25 RX ADMIN — AMLODIPINE BESYLATE 2.5 MG: 5 TABLET ORAL at 17:41

## 2024-09-25 RX ADMIN — SPIRONOLACTONE 25 MG: 25 TABLET ORAL at 17:41

## 2024-09-25 ASSESSMENT — ENCOUNTER SYMPTOMS
COUGH: 0
ABDOMINAL PAIN: 0
NAUSEA: 0
SHORTNESS OF BREATH: 1
BACK PAIN: 0
VOMITING: 0

## 2024-09-25 ASSESSMENT — PAIN - FUNCTIONAL ASSESSMENT: PAIN_FUNCTIONAL_ASSESSMENT: NONE - DENIES PAIN

## 2024-09-26 PROBLEM — N18.30 CKD (CHRONIC KIDNEY DISEASE) STAGE 3, GFR 30-59 ML/MIN (HCC): Status: ACTIVE | Noted: 2024-09-26

## 2024-09-26 LAB
ANION GAP SERPL CALC-SCNC: 5 MMOL/L (ref 3–18)
ANION GAP SERPL CALC-SCNC: 8 MMOL/L (ref 3–18)
BASOPHILS # BLD: 0 K/UL (ref 0–0.1)
BASOPHILS NFR BLD: 0 % (ref 0–2)
BUN SERPL-MCNC: 21 MG/DL (ref 7–18)
BUN SERPL-MCNC: 25 MG/DL (ref 7–18)
BUN/CREAT SERPL: 15 (ref 12–20)
BUN/CREAT SERPL: 15 (ref 12–20)
CALCIUM SERPL-MCNC: 8.4 MG/DL (ref 8.5–10.1)
CALCIUM SERPL-MCNC: 8.5 MG/DL (ref 8.5–10.1)
CHLORIDE SERPL-SCNC: 106 MMOL/L (ref 100–111)
CHLORIDE SERPL-SCNC: 108 MMOL/L (ref 100–111)
CO2 SERPL-SCNC: 24 MMOL/L (ref 21–32)
CO2 SERPL-SCNC: 26 MMOL/L (ref 21–32)
CREAT SERPL-MCNC: 1.36 MG/DL (ref 0.6–1.3)
CREAT SERPL-MCNC: 1.62 MG/DL (ref 0.6–1.3)
DIFFERENTIAL METHOD BLD: ABNORMAL
EKG ATRIAL RATE: 82 BPM
EKG DIAGNOSIS: NORMAL
EKG P AXIS: 77 DEGREES
EKG P-R INTERVAL: 196 MS
EKG Q-T INTERVAL: 404 MS
EKG QRS DURATION: 96 MS
EKG QTC CALCULATION (BAZETT): 472 MS
EKG R AXIS: 43 DEGREES
EKG T AXIS: 266 DEGREES
EKG VENTRICULAR RATE: 82 BPM
EOSINOPHIL # BLD: 0 K/UL (ref 0–0.4)
EOSINOPHIL NFR BLD: 0 % (ref 0–5)
ERYTHROCYTE [DISTWIDTH] IN BLOOD BY AUTOMATED COUNT: 13.2 % (ref 11.6–14.5)
GLUCOSE SERPL-MCNC: 133 MG/DL (ref 74–99)
GLUCOSE SERPL-MCNC: 168 MG/DL (ref 74–99)
HCT VFR BLD AUTO: 35.3 % (ref 36–48)
HGB BLD-MCNC: 12 G/DL (ref 13–16)
IMM GRANULOCYTES # BLD AUTO: 0 K/UL (ref 0–0.04)
IMM GRANULOCYTES NFR BLD AUTO: 0 % (ref 0–0.5)
LYMPHOCYTES # BLD: 1.4 K/UL (ref 0.9–3.6)
LYMPHOCYTES NFR BLD: 28 % (ref 21–52)
MAGNESIUM SERPL-MCNC: 1.3 MG/DL (ref 1.6–2.6)
MAGNESIUM SERPL-MCNC: 1.3 MG/DL (ref 1.6–2.6)
MCH RBC QN AUTO: 28.6 PG (ref 24–34)
MCHC RBC AUTO-ENTMCNC: 34 G/DL (ref 31–37)
MCV RBC AUTO: 84 FL (ref 78–100)
MONOCYTES # BLD: 0.6 K/UL (ref 0.05–1.2)
MONOCYTES NFR BLD: 13 % (ref 3–10)
NEUTS SEG # BLD: 2.9 K/UL (ref 1.8–8)
NEUTS SEG NFR BLD: 59 % (ref 40–73)
NRBC # BLD: 0 K/UL (ref 0–0.01)
NRBC BLD-RTO: 0 PER 100 WBC
PLATELET # BLD AUTO: 232 K/UL (ref 135–420)
PMV BLD AUTO: 10.7 FL (ref 9.2–11.8)
POTASSIUM SERPL-SCNC: 3.2 MMOL/L (ref 3.5–5.5)
POTASSIUM SERPL-SCNC: 3.4 MMOL/L (ref 3.5–5.5)
RBC # BLD AUTO: 4.2 M/UL (ref 4.35–5.65)
SODIUM SERPL-SCNC: 137 MMOL/L (ref 136–145)
SODIUM SERPL-SCNC: 140 MMOL/L (ref 136–145)
TROPONIN I SERPL HS-MCNC: 307 NG/L (ref 0–78)
TROPONIN I SERPL HS-MCNC: 348 NG/L (ref 0–78)
WBC # BLD AUTO: 4.9 K/UL (ref 4.6–13.2)

## 2024-09-26 PROCEDURE — 93010 ELECTROCARDIOGRAM REPORT: CPT | Performed by: INTERNAL MEDICINE

## 2024-09-26 PROCEDURE — 84484 ASSAY OF TROPONIN QUANT: CPT

## 2024-09-26 PROCEDURE — 6360000002 HC RX W HCPCS

## 2024-09-26 PROCEDURE — 80048 BASIC METABOLIC PNL TOTAL CA: CPT

## 2024-09-26 PROCEDURE — 94761 N-INVAS EAR/PLS OXIMETRY MLT: CPT

## 2024-09-26 PROCEDURE — 6370000000 HC RX 637 (ALT 250 FOR IP)

## 2024-09-26 PROCEDURE — 85025 COMPLETE CBC W/AUTO DIFF WBC: CPT

## 2024-09-26 PROCEDURE — 83735 ASSAY OF MAGNESIUM: CPT

## 2024-09-26 PROCEDURE — 97165 OT EVAL LOW COMPLEX 30 MIN: CPT

## 2024-09-26 PROCEDURE — 2580000003 HC RX 258

## 2024-09-26 PROCEDURE — 36415 COLL VENOUS BLD VENIPUNCTURE: CPT

## 2024-09-26 PROCEDURE — 1100000003 HC PRIVATE W/ TELEMETRY

## 2024-09-26 RX ORDER — FUROSEMIDE 10 MG/ML
20 INJECTION INTRAMUSCULAR; INTRAVENOUS ONCE
Status: COMPLETED | OUTPATIENT
Start: 2024-09-26 | End: 2024-09-26

## 2024-09-26 RX ADMIN — ISOSORBIDE MONONITRATE 60 MG: 60 TABLET, EXTENDED RELEASE ORAL at 08:37

## 2024-09-26 RX ADMIN — HYDRALAZINE HYDROCHLORIDE 25 MG: 25 TABLET ORAL at 05:35

## 2024-09-26 RX ADMIN — CARVEDILOL 6.25 MG: 6.25 TABLET, FILM COATED ORAL at 08:37

## 2024-09-26 RX ADMIN — SODIUM CHLORIDE, PRESERVATIVE FREE 10 ML: 5 INJECTION INTRAVENOUS at 08:38

## 2024-09-26 RX ADMIN — SPIRONOLACTONE 25 MG: 25 TABLET ORAL at 08:37

## 2024-09-26 RX ADMIN — FUROSEMIDE 40 MG: 40 TABLET ORAL at 08:37

## 2024-09-26 RX ADMIN — SODIUM CHLORIDE, PRESERVATIVE FREE 10 ML: 5 INJECTION INTRAVENOUS at 20:46

## 2024-09-26 RX ADMIN — ASPIRIN 81 MG CHEWABLE TABLET 81 MG: 81 TABLET CHEWABLE at 08:37

## 2024-09-26 RX ADMIN — POTASSIUM CHLORIDE 40 MEQ: 1500 TABLET, EXTENDED RELEASE ORAL at 08:38

## 2024-09-26 RX ADMIN — HYDRALAZINE HYDROCHLORIDE 25 MG: 25 TABLET ORAL at 13:58

## 2024-09-26 RX ADMIN — SACUBITRIL AND VALSARTAN 1 TABLET: 49; 51 TABLET, FILM COATED ORAL at 08:38

## 2024-09-26 RX ADMIN — CARVEDILOL 6.25 MG: 6.25 TABLET, FILM COATED ORAL at 17:38

## 2024-09-26 RX ADMIN — FERROUS SULFATE TAB 325 MG (65 MG ELEMENTAL FE) 325 MG: 325 (65 FE) TAB at 08:37

## 2024-09-26 RX ADMIN — FERROUS SULFATE TAB 325 MG (65 MG ELEMENTAL FE) 325 MG: 325 (65 FE) TAB at 20:45

## 2024-09-26 RX ADMIN — FERROUS SULFATE TAB 325 MG (65 MG ELEMENTAL FE) 325 MG: 325 (65 FE) TAB at 00:53

## 2024-09-26 RX ADMIN — AMLODIPINE BESYLATE 2.5 MG: 2.5 TABLET ORAL at 08:37

## 2024-09-26 RX ADMIN — HYDRALAZINE HYDROCHLORIDE 25 MG: 25 TABLET ORAL at 22:40

## 2024-09-26 RX ADMIN — ENOXAPARIN SODIUM 40 MG: 100 INJECTION SUBCUTANEOUS at 08:38

## 2024-09-26 RX ADMIN — ATORVASTATIN CALCIUM 40 MG: 40 TABLET, FILM COATED ORAL at 00:53

## 2024-09-26 RX ADMIN — EMPAGLIFLOZIN 10 MG: 10 TABLET, FILM COATED ORAL at 08:37

## 2024-09-26 RX ADMIN — ATORVASTATIN CALCIUM 40 MG: 40 TABLET, FILM COATED ORAL at 20:45

## 2024-09-26 RX ADMIN — SACUBITRIL AND VALSARTAN 1 TABLET: 49; 51 TABLET, FILM COATED ORAL at 20:46

## 2024-09-26 RX ADMIN — FUROSEMIDE 20 MG: 10 INJECTION, SOLUTION INTRAMUSCULAR; INTRAVENOUS at 12:40

## 2024-09-26 ASSESSMENT — PAIN SCALES - GENERAL
PAINLEVEL_OUTOF10: 0

## 2024-09-27 VITALS
RESPIRATION RATE: 18 BRPM | OXYGEN SATURATION: 99 % | HEIGHT: 67 IN | DIASTOLIC BLOOD PRESSURE: 89 MMHG | TEMPERATURE: 98.4 F | BODY MASS INDEX: 22.66 KG/M2 | HEART RATE: 88 BPM | SYSTOLIC BLOOD PRESSURE: 149 MMHG | WEIGHT: 144.4 LBS

## 2024-09-27 LAB
ANION GAP SERPL CALC-SCNC: 6 MMOL/L (ref 3–18)
BASOPHILS # BLD: 0 K/UL (ref 0–0.1)
BASOPHILS NFR BLD: 1 % (ref 0–2)
BUN SERPL-MCNC: 22 MG/DL (ref 7–18)
BUN/CREAT SERPL: 13 (ref 12–20)
CALCIUM SERPL-MCNC: 9.1 MG/DL (ref 8.5–10.1)
CHLORIDE SERPL-SCNC: 104 MMOL/L (ref 100–111)
CO2 SERPL-SCNC: 30 MMOL/L (ref 21–32)
CREAT SERPL-MCNC: 1.73 MG/DL (ref 0.6–1.3)
DIFFERENTIAL METHOD BLD: ABNORMAL
EOSINOPHIL # BLD: 0 K/UL (ref 0–0.4)
EOSINOPHIL NFR BLD: 1 % (ref 0–5)
ERYTHROCYTE [DISTWIDTH] IN BLOOD BY AUTOMATED COUNT: 13 % (ref 11.6–14.5)
GLUCOSE SERPL-MCNC: 138 MG/DL (ref 74–99)
HCT VFR BLD AUTO: 37 % (ref 36–48)
HGB BLD-MCNC: 12.3 G/DL (ref 13–16)
IMM GRANULOCYTES # BLD AUTO: 0 K/UL (ref 0–0.04)
IMM GRANULOCYTES NFR BLD AUTO: 0 % (ref 0–0.5)
LYMPHOCYTES # BLD: 1.4 K/UL (ref 0.9–3.6)
LYMPHOCYTES NFR BLD: 32 % (ref 21–52)
MCH RBC QN AUTO: 27.6 PG (ref 24–34)
MCHC RBC AUTO-ENTMCNC: 33.2 G/DL (ref 31–37)
MCV RBC AUTO: 83 FL (ref 78–100)
MONOCYTES # BLD: 0.6 K/UL (ref 0.05–1.2)
MONOCYTES NFR BLD: 14 % (ref 3–10)
NEUTS SEG # BLD: 2.3 K/UL (ref 1.8–8)
NEUTS SEG NFR BLD: 53 % (ref 40–73)
NRBC # BLD: 0 K/UL (ref 0–0.01)
NRBC BLD-RTO: 0 PER 100 WBC
NT PRO BNP: ABNORMAL PG/ML (ref 0–900)
PLATELET # BLD AUTO: 248 K/UL (ref 135–420)
PMV BLD AUTO: 10.5 FL (ref 9.2–11.8)
POTASSIUM SERPL-SCNC: 3.8 MMOL/L (ref 3.5–5.5)
RBC # BLD AUTO: 4.46 M/UL (ref 4.35–5.65)
SODIUM SERPL-SCNC: 140 MMOL/L (ref 136–145)
WBC # BLD AUTO: 4.4 K/UL (ref 4.6–13.2)

## 2024-09-27 PROCEDURE — 80048 BASIC METABOLIC PNL TOTAL CA: CPT

## 2024-09-27 PROCEDURE — 6370000000 HC RX 637 (ALT 250 FOR IP)

## 2024-09-27 PROCEDURE — 94761 N-INVAS EAR/PLS OXIMETRY MLT: CPT

## 2024-09-27 PROCEDURE — 83880 ASSAY OF NATRIURETIC PEPTIDE: CPT

## 2024-09-27 PROCEDURE — 2580000003 HC RX 258

## 2024-09-27 PROCEDURE — 6360000002 HC RX W HCPCS

## 2024-09-27 PROCEDURE — 36415 COLL VENOUS BLD VENIPUNCTURE: CPT

## 2024-09-27 PROCEDURE — 85025 COMPLETE CBC W/AUTO DIFF WBC: CPT

## 2024-09-27 RX ORDER — MAGNESIUM SULFATE IN WATER 40 MG/ML
2000 INJECTION, SOLUTION INTRAVENOUS PRN
Status: DISCONTINUED | OUTPATIENT
Start: 2024-09-27 | End: 2024-09-27 | Stop reason: HOSPADM

## 2024-09-27 RX ORDER — POTASSIUM CHLORIDE 1500 MG/1
20 TABLET, EXTENDED RELEASE ORAL PRN
Status: DISCONTINUED | OUTPATIENT
Start: 2024-09-27 | End: 2024-09-27 | Stop reason: HOSPADM

## 2024-09-27 RX ORDER — MAGNESIUM SULFATE IN WATER 40 MG/ML
2000 INJECTION, SOLUTION INTRAVENOUS PRN
Status: DISCONTINUED | OUTPATIENT
Start: 2024-09-27 | End: 2024-09-27

## 2024-09-27 RX ORDER — AMLODIPINE BESYLATE 2.5 MG/1
2.5 TABLET ORAL ONCE
Status: COMPLETED | OUTPATIENT
Start: 2024-09-27 | End: 2024-09-27

## 2024-09-27 RX ORDER — HYDRALAZINE HYDROCHLORIDE 25 MG/1
25 TABLET, FILM COATED ORAL EVERY 8 HOURS SCHEDULED
Qty: 90 TABLET | Refills: 3 | Status: SHIPPED | OUTPATIENT
Start: 2024-09-27

## 2024-09-27 RX ORDER — AMLODIPINE BESYLATE 5 MG/1
5 TABLET ORAL DAILY
Qty: 90 TABLET | Refills: 0 | Status: SHIPPED | OUTPATIENT
Start: 2024-09-27

## 2024-09-27 RX ADMIN — AMLODIPINE BESYLATE 2.5 MG: 2.5 TABLET ORAL at 10:16

## 2024-09-27 RX ADMIN — EMPAGLIFLOZIN 10 MG: 10 TABLET, FILM COATED ORAL at 08:18

## 2024-09-27 RX ADMIN — SODIUM CHLORIDE, PRESERVATIVE FREE 10 ML: 5 INJECTION INTRAVENOUS at 08:32

## 2024-09-27 RX ADMIN — FUROSEMIDE 40 MG: 40 TABLET ORAL at 08:18

## 2024-09-27 RX ADMIN — MAGNESIUM SULFATE HEPTAHYDRATE 2000 MG: 40 INJECTION, SOLUTION INTRAVENOUS at 08:30

## 2024-09-27 RX ADMIN — CARVEDILOL 6.25 MG: 6.25 TABLET, FILM COATED ORAL at 08:16

## 2024-09-27 RX ADMIN — FERROUS SULFATE TAB 325 MG (65 MG ELEMENTAL FE) 325 MG: 325 (65 FE) TAB at 08:19

## 2024-09-27 RX ADMIN — POTASSIUM CHLORIDE 40 MEQ: 1500 TABLET, EXTENDED RELEASE ORAL at 08:16

## 2024-09-27 RX ADMIN — ISOSORBIDE MONONITRATE 60 MG: 60 TABLET, EXTENDED RELEASE ORAL at 08:17

## 2024-09-27 RX ADMIN — ENOXAPARIN SODIUM 40 MG: 100 INJECTION SUBCUTANEOUS at 08:31

## 2024-09-27 RX ADMIN — AMLODIPINE BESYLATE 2.5 MG: 2.5 TABLET ORAL at 08:20

## 2024-09-27 RX ADMIN — SACUBITRIL AND VALSARTAN 1 TABLET: 49; 51 TABLET, FILM COATED ORAL at 08:18

## 2024-09-27 RX ADMIN — ASPIRIN 81 MG CHEWABLE TABLET 81 MG: 81 TABLET CHEWABLE at 08:18

## 2024-09-27 RX ADMIN — SPIRONOLACTONE 25 MG: 25 TABLET ORAL at 08:19

## 2024-09-27 RX ADMIN — HYDRALAZINE HYDROCHLORIDE 25 MG: 25 TABLET ORAL at 06:08

## 2024-09-27 ASSESSMENT — PAIN SCALES - GENERAL
PAINLEVEL_OUTOF10: 0

## 2024-10-26 PROBLEM — R79.89 ELEVATED TROPONIN: Status: RESOLVED | Noted: 2023-03-09 | Resolved: 2024-10-26

## 2025-01-27 ENCOUNTER — HOSPITAL ENCOUNTER (EMERGENCY)
Facility: HOSPITAL | Age: 65
Discharge: HOME | End: 2025-01-27
Attending: STUDENT IN AN ORGANIZED HEALTH CARE EDUCATION/TRAINING PROGRAM
Payer: MEDICAID

## 2025-01-27 ENCOUNTER — APPOINTMENT (OUTPATIENT)
Facility: HOSPITAL | Age: 65
End: 2025-01-27
Payer: MEDICAID

## 2025-01-27 VITALS
HEART RATE: 56 BPM | RESPIRATION RATE: 18 BRPM | BODY MASS INDEX: 18.83 KG/M2 | HEIGHT: 67 IN | DIASTOLIC BLOOD PRESSURE: 74 MMHG | WEIGHT: 120 LBS | SYSTOLIC BLOOD PRESSURE: 106 MMHG | TEMPERATURE: 97.4 F | OXYGEN SATURATION: 94 %

## 2025-01-27 DIAGNOSIS — R19.7 DIARRHEA, UNSPECIFIED TYPE: Primary | ICD-10-CM

## 2025-01-27 LAB
ALBUMIN SERPL-MCNC: 3.4 G/DL (ref 3.4–5)
ALBUMIN/GLOB SERPL: 0.8 (ref 0.8–1.7)
ALP SERPL-CCNC: 96 U/L (ref 45–117)
ALT SERPL-CCNC: 35 U/L (ref 16–61)
ANION GAP SERPL CALC-SCNC: 4 MMOL/L (ref 3–18)
APPEARANCE UR: CLEAR
AST SERPL-CCNC: 40 U/L (ref 10–38)
BASOPHILS # BLD: 0.01 K/UL (ref 0–0.1)
BASOPHILS NFR BLD: 0.2 % (ref 0–2)
BILIRUB SERPL-MCNC: 0.5 MG/DL (ref 0.2–1)
BILIRUB UR QL: NEGATIVE
BUN SERPL-MCNC: 26 MG/DL (ref 7–18)
BUN/CREAT SERPL: 13 (ref 12–20)
CALCIUM SERPL-MCNC: 8.8 MG/DL (ref 8.5–10.1)
CHLORIDE SERPL-SCNC: 116 MMOL/L (ref 100–111)
CO2 SERPL-SCNC: 16 MMOL/L (ref 21–32)
COLOR UR: YELLOW
CREAT SERPL-MCNC: 1.98 MG/DL (ref 0.6–1.3)
DIFFERENTIAL METHOD BLD: ABNORMAL
EOSINOPHIL # BLD: 0 K/UL (ref 0–0.4)
EOSINOPHIL NFR BLD: 0 % (ref 0–5)
ERYTHROCYTE [DISTWIDTH] IN BLOOD BY AUTOMATED COUNT: 13.2 % (ref 11.6–14.5)
GLOBULIN SER CALC-MCNC: 4.4 G/DL (ref 2–4)
GLUCOSE SERPL-MCNC: 122 MG/DL (ref 74–99)
GLUCOSE UR STRIP.AUTO-MCNC: 500 MG/DL
HCT VFR BLD AUTO: 42.3 % (ref 36–48)
HGB BLD-MCNC: 13.5 G/DL (ref 13–16)
HGB UR QL STRIP: NEGATIVE
IMM GRANULOCYTES # BLD AUTO: 0.02 K/UL (ref 0–0.04)
IMM GRANULOCYTES NFR BLD AUTO: 0.3 % (ref 0–0.5)
KETONES UR QL STRIP.AUTO: NEGATIVE MG/DL
LEUKOCYTE ESTERASE UR QL STRIP.AUTO: NEGATIVE
LIPASE SERPL-CCNC: 27 U/L (ref 13–75)
LYMPHOCYTES # BLD: 1.19 K/UL (ref 0.9–3.6)
LYMPHOCYTES NFR BLD: 20.2 % (ref 21–52)
MCH RBC QN AUTO: 27.1 PG (ref 24–34)
MCHC RBC AUTO-ENTMCNC: 31.9 G/DL (ref 31–37)
MCV RBC AUTO: 84.9 FL (ref 78–100)
MONOCYTES # BLD: 0.45 K/UL (ref 0.05–1.2)
MONOCYTES NFR BLD: 7.6 % (ref 3–10)
NEUTS SEG # BLD: 4.22 K/UL (ref 1.8–8)
NEUTS SEG NFR BLD: 71.7 % (ref 40–73)
NITRITE UR QL STRIP.AUTO: NEGATIVE
NRBC # BLD: 0 K/UL (ref 0–0.01)
NRBC BLD-RTO: 0 PER 100 WBC
PH UR STRIP: 5 (ref 5–8)
PLATELET # BLD AUTO: 296 K/UL (ref 135–420)
PMV BLD AUTO: 10.4 FL (ref 9.2–11.8)
POTASSIUM SERPL-SCNC: 4.8 MMOL/L (ref 3.5–5.5)
PROT SERPL-MCNC: 7.8 G/DL (ref 6.4–8.2)
PROT UR STRIP-MCNC: NEGATIVE MG/DL
RBC # BLD AUTO: 4.98 M/UL (ref 4.35–5.65)
SODIUM SERPL-SCNC: 136 MMOL/L (ref 136–145)
SP GR UR REFRACTOMETRY: 1.01 (ref 1–1.03)
TROPONIN I SERPL HS-MCNC: 182 NG/L (ref 0–78)
TROPONIN I SERPL HS-MCNC: 81 NG/L (ref 0–78)
UROBILINOGEN UR QL STRIP.AUTO: 0.2 EU/DL (ref 0.2–1)
WBC # BLD AUTO: 5.9 K/UL (ref 4.6–13.2)

## 2025-01-27 PROCEDURE — 84484 ASSAY OF TROPONIN QUANT: CPT

## 2025-01-27 PROCEDURE — 96374 THER/PROPH/DIAG INJ IV PUSH: CPT

## 2025-01-27 PROCEDURE — 83690 ASSAY OF LIPASE: CPT

## 2025-01-27 PROCEDURE — 80053 COMPREHEN METABOLIC PANEL: CPT

## 2025-01-27 PROCEDURE — 96361 HYDRATE IV INFUSION ADD-ON: CPT

## 2025-01-27 PROCEDURE — 6360000002 HC RX W HCPCS: Performed by: STUDENT IN AN ORGANIZED HEALTH CARE EDUCATION/TRAINING PROGRAM

## 2025-01-27 PROCEDURE — 85025 COMPLETE CBC W/AUTO DIFF WBC: CPT

## 2025-01-27 PROCEDURE — 6360000004 HC RX CONTRAST MEDICATION: Performed by: STUDENT IN AN ORGANIZED HEALTH CARE EDUCATION/TRAINING PROGRAM

## 2025-01-27 PROCEDURE — 74177 CT ABD & PELVIS W/CONTRAST: CPT

## 2025-01-27 PROCEDURE — 99285 EMERGENCY DEPT VISIT HI MDM: CPT

## 2025-01-27 PROCEDURE — 2580000003 HC RX 258: Performed by: STUDENT IN AN ORGANIZED HEALTH CARE EDUCATION/TRAINING PROGRAM

## 2025-01-27 PROCEDURE — 81003 URINALYSIS AUTO W/O SCOPE: CPT

## 2025-01-27 PROCEDURE — 93005 ELECTROCARDIOGRAM TRACING: CPT | Performed by: STUDENT IN AN ORGANIZED HEALTH CARE EDUCATION/TRAINING PROGRAM

## 2025-01-27 RX ORDER — IODIXANOL 320 MG/ML
80 INJECTION, SOLUTION INTRAVASCULAR
Status: COMPLETED | OUTPATIENT
Start: 2025-01-27 | End: 2025-01-27

## 2025-01-27 RX ORDER — SODIUM CHLORIDE, SODIUM LACTATE, POTASSIUM CHLORIDE, AND CALCIUM CHLORIDE .6; .31; .03; .02 G/100ML; G/100ML; G/100ML; G/100ML
500 INJECTION, SOLUTION INTRAVENOUS ONCE
Status: COMPLETED | OUTPATIENT
Start: 2025-01-27 | End: 2025-01-27

## 2025-01-27 RX ORDER — ONDANSETRON 2 MG/ML
4 INJECTION INTRAMUSCULAR; INTRAVENOUS
Status: COMPLETED | OUTPATIENT
Start: 2025-01-27 | End: 2025-01-27

## 2025-01-27 RX ORDER — SODIUM CHLORIDE, SODIUM LACTATE, POTASSIUM CHLORIDE, AND CALCIUM CHLORIDE .6; .31; .03; .02 G/100ML; G/100ML; G/100ML; G/100ML
1000 INJECTION, SOLUTION INTRAVENOUS ONCE
Status: DISCONTINUED | OUTPATIENT
Start: 2025-01-27 | End: 2025-01-27

## 2025-01-27 RX ADMIN — SODIUM CHLORIDE, POTASSIUM CHLORIDE, SODIUM LACTATE AND CALCIUM CHLORIDE 500 ML: 600; 310; 30; 20 INJECTION, SOLUTION INTRAVENOUS at 13:41

## 2025-01-27 RX ADMIN — IODIXANOL 80 ML: 320 INJECTION, SOLUTION INTRAVASCULAR at 16:31

## 2025-01-27 RX ADMIN — ONDANSETRON 4 MG: 2 INJECTION, SOLUTION INTRAMUSCULAR; INTRAVENOUS at 13:41

## 2025-01-27 ASSESSMENT — PAIN - FUNCTIONAL ASSESSMENT: PAIN_FUNCTIONAL_ASSESSMENT: NONE - DENIES PAIN

## 2025-01-27 NOTE — ED TRIAGE NOTES
Pt arrived from home via EMS, pt has had 5 days of diarrhea, limited PO inake, pt was found unresponsive on floor in bathroom with  no radial pulses (no fall, due to weakness). EMS gave pt 500 NS bolus with pt now alert, following commands. Last BP by EMS was 102/60, HR 40s, sinus norman on EKG, pt has hx CHF with EF 27%, .

## 2025-01-27 NOTE — ED NOTES
Pt provided AVS and d/c instructions. Pt denies any further questions or needs at this time. Pt discharged home in no distress with wife, pt is ambulatory with steady gait observed in ED room but requested a wheelchair to lobby. RN took pt to lobby via wheelchair.

## 2025-01-27 NOTE — ED PROVIDER NOTES
Dr. Busby taken for patient care.  Patient's repeat troponin 81 and initial 182.  Vital signs have stabilized.  Patient appears overall well and comfortable.  Per previous provider if troponin can back downtrending would be appropriate for discharge.  Lab work on review showing creatinine 1.98 which is around his baseline, bicarb 16, otherwise labs were grossly within normal limits.  Patient did get IV fluids while here for likely dehydration.  CTA scan of the abdomen pelvis without any significant intra-abdominal abnormalities.  Will discharge patient home with strict return precautions and follow-up recommendations.  Patient verbalized understanding and is without further questions.  Comfortable with plan.     Elijah Busby Jr., DO  01/27/25 0766

## 2025-01-27 NOTE — ED NOTES
Pt has two working IVs but unable to obtain blood work at this time. Dr. Kamara notified and instructed to warm pt up with warm blankets and have him drink water. Pt given warm blankets and water, will reassess.

## 2025-01-28 LAB
EKG ATRIAL RATE: 48 BPM
EKG DIAGNOSIS: NORMAL
EKG P AXIS: 64 DEGREES
EKG P-R INTERVAL: 204 MS
EKG Q-T INTERVAL: 524 MS
EKG QRS DURATION: 92 MS
EKG QTC CALCULATION (BAZETT): 468 MS
EKG R AXIS: 94 DEGREES
EKG T AXIS: 233 DEGREES
EKG VENTRICULAR RATE: 48 BPM

## 2025-01-28 PROCEDURE — 93010 ELECTROCARDIOGRAM REPORT: CPT | Performed by: INTERNAL MEDICINE

## 2025-02-13 ENCOUNTER — OFFICE VISIT (OUTPATIENT)
Age: 65
End: 2025-02-13
Payer: MEDICAID

## 2025-02-13 VITALS
WEIGHT: 133 LBS | SYSTOLIC BLOOD PRESSURE: 131 MMHG | DIASTOLIC BLOOD PRESSURE: 90 MMHG | OXYGEN SATURATION: 98 % | HEART RATE: 61 BPM | HEIGHT: 67 IN | BODY MASS INDEX: 20.88 KG/M2

## 2025-02-13 DIAGNOSIS — I42.0 DILATED CARDIOMYOPATHY (HCC): Primary | ICD-10-CM

## 2025-02-13 DIAGNOSIS — I42.9 CARDIOMYOPATHY, UNSPECIFIED TYPE (HCC): ICD-10-CM

## 2025-02-13 DIAGNOSIS — I10 ESSENTIAL HYPERTENSION WITH GOAL BLOOD PRESSURE LESS THAN 140/90: ICD-10-CM

## 2025-02-13 DIAGNOSIS — I50.41 ACUTE COMBINED SYSTOLIC AND DIASTOLIC CHF, NYHA CLASS 2 (HCC): ICD-10-CM

## 2025-02-13 PROCEDURE — 99214 OFFICE O/P EST MOD 30 MIN: CPT | Performed by: INTERNAL MEDICINE

## 2025-02-13 PROCEDURE — 1124F ACP DISCUSS-NO DSCNMKR DOCD: CPT | Performed by: INTERNAL MEDICINE

## 2025-02-13 PROCEDURE — 3075F SYST BP GE 130 - 139MM HG: CPT | Performed by: INTERNAL MEDICINE

## 2025-02-13 PROCEDURE — 3080F DIAST BP >= 90 MM HG: CPT | Performed by: INTERNAL MEDICINE

## 2025-02-13 NOTE — PATIENT INSTRUCTIONS
Echo ( limited) in 6 months  PATIENT PREPS:   -Wear easy to remove shirt to your appointment for easier accessibility.    **call office 3-5 days after testing is completed for results** Please ensure testing is completed prior to scheduled follow up appointment. If it is not completed your appointment may be rescheduled**

## 2025-02-13 NOTE — PROGRESS NOTES
Identified pt with two pt identifiers(name and ). Reviewed record in preparation for visit and have obtained necessary documentation.    Phong Lucas presents today for   Chief Complaint   Patient presents with    Follow-up     6m       Pt denies DIZZINESS, SOB, CHEST PAIN/ PRESSURE, FATIGUE/WEAKNESS, HEADACHES, SWELLING.             Phong Lucas preferred language for health care discussion is english/other.    Personal Protective Equipment:   Personal Protective Equipment was used including: mask-surgical and hands-gloves. Patient was placed on no precaution(s). Patient was not masked.    Precautions:   Patient currently on None  Patient currently roomed with door closed.    Is someone accompanying this pt? no    Is the patient using any DME equipment during OV? no    Depression Screenin/13/2025    10:30 AM 2024    10:32 AM 2023     2:20 PM   PHQ-9 Questionaire   Little interest or pleasure in doing things 0 0 0   Feeling down, depressed, or hopeless 0 0 0   PHQ-9 Total Score 0 0 0        Learning Assessment:  Who is the primary learner? Patient    What is the preferred language for health care of the primary learner? ENGLISH    How does the primary learner prefer to learn new concepts? DEMONSTRATION    Answered By pateint    Relationship to Learner SELF        Abuse Screenin/13/2025    10:00 AM 2024    10:00 AM   AMB Abuse Screening   Do you ever feel afraid of your partner? N N   Are you in a relationship with someone who physically or mentally threatens you? N N   Is it safe for you to go home? Y Y          Fall Risk      2025    10:37 AM 2024    10:32 AM   Fall Risk   Do you feel unsteady or are you worried about falling?  no no   2 or more falls in past year? no no   Fall with injury in past year? no no         Pt currently taking Anticoagulant /Antiplatelet therapy? Aspirin 81mg    Coordination of Care:  1. Have you been to the ER, urgent care clinic since

## 2025-02-13 NOTE — PROGRESS NOTES
[facility]    Phong Lucas is 64 y.o. male with nonischemic cardiopathy, substance abuse, hypertension, hyperlipidemia    Patient is here today for follow-up appointment for cardiomyopathy  Patient was diagnosed with nonischemic cardiomyopathy with LVEF 25% by echocardiogram in 02/2022  C in 03/2023 did not show any obstructive coronary disease  Echo in 05/2024 with EF of 25%    Patient is here today for follow-up appointment.   Denies any resting or exertional chest pain or chest pressure to suggest angina or any dyspnea to suggest heart failure.   No presyncope or syncope  Denies any PND or LE edema.   Taking all medications regularly.  He says that he is taking medication more often than not.  Unfortunately continues to do cocaine    Past Medical History:   Diagnosis Date    Cardiomyopathy (HCC)     Cocaine abuse (HCC)     HTN (hypertension)     Hypertension     Stroke (HCC)     Stroke risk 1986,2004, 2009    pt stated he had a stroke in above dates       Review of Systems:  Cardiac symptoms as noted above in HPI. All others negative.    Current Outpatient Medications   Medication Sig    amLODIPine (NORVASC) 5 MG tablet Take 1 tablet by mouth daily    furosemide (LASIX) 40 MG tablet Take 1 tablet by mouth 2 times daily    sacubitril-valsartan (ENTRESTO) 49-51 MG per tablet Take 1 tablet by mouth 2 times daily    hydrALAZINE (APRESOLINE) 25 MG tablet Take 1 tablet by mouth every 8 hours    aspirin 81 MG chewable tablet Take 1 tablet by mouth daily    isosorbide mononitrate (IMDUR) 60 MG extended release tablet Take 1 tablet by mouth daily (Patient taking differently: Take 0.5 tablets by mouth daily)    empagliflozin (JARDIANCE) 10 MG tablet Take 1 tablet by mouth daily    atorvastatin (LIPITOR) 40 MG tablet Take 1 tablet by mouth nightly    carvedilol (COREG) 6.25 MG tablet Take 1 tablet by mouth 2 times daily (with meals)

## 2025-03-03 ENCOUNTER — APPOINTMENT (OUTPATIENT)
Facility: HOSPITAL | Age: 65
End: 2025-03-03
Payer: MEDICARE

## 2025-03-03 ENCOUNTER — HOSPITAL ENCOUNTER (EMERGENCY)
Facility: HOSPITAL | Age: 65
Discharge: HOME | End: 2025-03-03
Attending: INTERNAL MEDICINE | Admitting: INTERNAL MEDICINE
Payer: MEDICARE

## 2025-03-03 VITALS
HEIGHT: 67 IN | RESPIRATION RATE: 14 BRPM | DIASTOLIC BLOOD PRESSURE: 73 MMHG | TEMPERATURE: 97.5 F | HEART RATE: 62 BPM | SYSTOLIC BLOOD PRESSURE: 121 MMHG | OXYGEN SATURATION: 99 % | BODY MASS INDEX: 21.19 KG/M2 | WEIGHT: 135 LBS

## 2025-03-03 DIAGNOSIS — R19.7 DIARRHEA, UNSPECIFIED TYPE: Primary | ICD-10-CM

## 2025-03-03 PROBLEM — R65.21 SEPTIC SHOCK (HCC): Status: ACTIVE | Noted: 2025-03-03

## 2025-03-03 PROBLEM — A41.9 SEPTIC SHOCK (HCC): Status: ACTIVE | Noted: 2025-03-03

## 2025-03-03 LAB
ALBUMIN SERPL-MCNC: 3.3 G/DL (ref 3.4–5)
ALBUMIN/GLOB SERPL: 0.8 (ref 0.8–1.7)
ALP SERPL-CCNC: 115 U/L (ref 45–117)
ALT SERPL-CCNC: 83 U/L (ref 16–61)
ANION GAP SERPL CALC-SCNC: 4 MMOL/L (ref 3–18)
APPEARANCE UR: CLEAR
AST SERPL-CCNC: 111 U/L (ref 10–38)
BACTERIA URNS QL MICRO: NEGATIVE /HPF
BASOPHILS # BLD: 0.01 K/UL (ref 0–0.1)
BASOPHILS NFR BLD: 0.2 % (ref 0–2)
BILIRUB SERPL-MCNC: 1.7 MG/DL (ref 0.2–1)
BILIRUB UR QL: NEGATIVE
BUN SERPL-MCNC: 24 MG/DL (ref 7–18)
BUN/CREAT SERPL: 11 (ref 12–20)
CALCIUM SERPL-MCNC: 8.1 MG/DL (ref 8.5–10.1)
CHLORIDE SERPL-SCNC: 111 MMOL/L (ref 100–111)
CO2 SERPL-SCNC: 21 MMOL/L (ref 21–32)
COLOR UR: YELLOW
CREAT SERPL-MCNC: 2.2 MG/DL (ref 0.6–1.3)
DIFFERENTIAL METHOD BLD: ABNORMAL
EOSINOPHIL # BLD: 0 K/UL (ref 0–0.4)
EOSINOPHIL NFR BLD: 0 % (ref 0–5)
EPITH CASTS URNS QL MICRO: NORMAL /LPF (ref 0–5)
ERYTHROCYTE [DISTWIDTH] IN BLOOD BY AUTOMATED COUNT: 13.8 % (ref 11.6–14.5)
GLOBULIN SER CALC-MCNC: 4.2 G/DL (ref 2–4)
GLUCOSE SERPL-MCNC: 122 MG/DL (ref 74–99)
GLUCOSE UR STRIP.AUTO-MCNC: NEGATIVE MG/DL
HCT VFR BLD AUTO: 36.4 % (ref 36–48)
HGB BLD-MCNC: 11.8 G/DL (ref 13–16)
HGB UR QL STRIP: NEGATIVE
IMM GRANULOCYTES # BLD AUTO: 0.02 K/UL (ref 0–0.04)
IMM GRANULOCYTES NFR BLD AUTO: 0.4 % (ref 0–0.5)
KETONES UR QL STRIP.AUTO: NEGATIVE MG/DL
LEUKOCYTE ESTERASE UR QL STRIP.AUTO: NEGATIVE
LYMPHOCYTES # BLD: 1.26 K/UL (ref 0.9–3.6)
LYMPHOCYTES NFR BLD: 28.1 % (ref 21–52)
MCH RBC QN AUTO: 28.1 PG (ref 24–34)
MCHC RBC AUTO-ENTMCNC: 32.4 G/DL (ref 31–37)
MCV RBC AUTO: 86.7 FL (ref 78–100)
MONOCYTES # BLD: 0.57 K/UL (ref 0.05–1.2)
MONOCYTES NFR BLD: 12.7 % (ref 3–10)
NEUTS SEG # BLD: 2.62 K/UL (ref 1.8–8)
NEUTS SEG NFR BLD: 58.6 % (ref 40–73)
NITRITE UR QL STRIP.AUTO: NEGATIVE
NRBC # BLD: 0 K/UL (ref 0–0.01)
NRBC BLD-RTO: 0 PER 100 WBC
PH UR STRIP: 5.5 (ref 5–8)
PLATELET # BLD AUTO: 308 K/UL (ref 135–420)
PMV BLD AUTO: 11 FL (ref 9.2–11.8)
POTASSIUM SERPL-SCNC: 4.6 MMOL/L (ref 3.5–5.5)
PROT SERPL-MCNC: 7.5 G/DL (ref 6.4–8.2)
PROT UR STRIP-MCNC: 30 MG/DL
RBC # BLD AUTO: 4.2 M/UL (ref 4.35–5.65)
RBC #/AREA URNS HPF: NEGATIVE /HPF (ref 0–5)
SODIUM SERPL-SCNC: 136 MMOL/L (ref 136–145)
SP GR UR REFRACTOMETRY: 1.01 (ref 1–1.03)
UROBILINOGEN UR QL STRIP.AUTO: 0.2 EU/DL (ref 0.2–1)
WBC # BLD AUTO: 4.5 K/UL (ref 4.6–13.2)
WBC URNS QL MICRO: NEGATIVE /HPF (ref 0–5)

## 2025-03-03 PROCEDURE — 81001 URINALYSIS AUTO W/SCOPE: CPT

## 2025-03-03 PROCEDURE — 1100000000 HC RM PRIVATE

## 2025-03-03 PROCEDURE — 99285 EMERGENCY DEPT VISIT HI MDM: CPT

## 2025-03-03 PROCEDURE — 87449 NOS EACH ORGANISM AG IA: CPT

## 2025-03-03 PROCEDURE — 85025 COMPLETE CBC W/AUTO DIFF WBC: CPT

## 2025-03-03 PROCEDURE — 74176 CT ABD & PELVIS W/O CONTRAST: CPT

## 2025-03-03 PROCEDURE — 80053 COMPREHEN METABOLIC PANEL: CPT

## 2025-03-03 PROCEDURE — 80074 ACUTE HEPATITIS PANEL: CPT

## 2025-03-03 PROCEDURE — 87324 CLOSTRIDIUM AG IA: CPT

## 2025-03-03 PROCEDURE — 87506 IADNA-DNA/RNA PROBE TQ 6-11: CPT

## 2025-03-03 NOTE — ED NOTES
Diarrhea x2 weeks solid. Pt also wants an inguinal hernia looked at, R groin. Denies SOB/CP at this tlme.

## 2025-03-03 NOTE — ED PROVIDER NOTES
reviewed with the patient. All of pt's questions and concerns were addressed.  Alarm symptoms and return precautions associated with chief complaint and evaluation were reviewed with the patient in detail.  The patient demonstrated adequate understanding.  Patient was instructed to follow up with PCP, as well as given strict return precautions to the ED upon further deterioration. Patient is ready for discharge home.          Dragon Disclaimer     Please note that this dictation was completed with [employer] the computer voice recognition software.  Quite often unanticipated grammatical, syntax, homophones, and other interpretive errors are inadvertently transcribed by the computer software.  Please disregard these errors.  Please excuse any errors that have escaped final proofreading.      RADHIKA High Jana L, PA  03/03/25 0589

## 2025-03-03 NOTE — ED TRIAGE NOTES
Diarrhea x2 weeks solid. Pt also wants an inguinal hernia looked at, R groin. Denies SOB/CP at this time

## 2025-03-04 LAB
-: ABNORMAL
C COLI+JEJUNI TUF STL QL NAA+PROBE: POSITIVE
C DIFF GDH STL QL: NEGATIVE
C DIFF TOX A+B STL QL IA: NEGATIVE
C DIFF TOXIN INTERPRETATION: NORMAL
EC STX1+STX2 GENES STL QL NAA+PROBE: NEGATIVE
ETEC ELTA+ESTB GENES STL QL NAA+PROBE: NEGATIVE
HAV IGM SER QL: NEGATIVE
HBV CORE IGM SER QL: NEGATIVE
HBV SURFACE AG SER QL: <0.1 INDEX
HBV SURFACE AG SER QL: NEGATIVE
HCV AB SER IA-ACNC: >11 INDEX
HCV AB SERPL QL IA: POSITIVE
HEPATITIS C COMMENT: ABNORMAL
P SHIGELLOIDES DNA STL QL NAA+PROBE: NEGATIVE
SALMONELLA SP SPAO STL QL NAA+PROBE: NEGATIVE
SHIGELLA SP+EIEC IPAH STL QL NAA+PROBE: NEGATIVE
V CHOL+PARA+VUL DNA STL QL NAA+NON-PROBE: NEGATIVE
Y ENTEROCOL DNA STL QL NAA+NON-PROBE: NEGATIVE

## 2025-03-04 NOTE — ED NOTES
7:00 PM Assumed care of the patient at this time. Discussed with JANE Ellis concerning patient Phong Lucas, standard discussion of reason for visit, HPI, ROS, PE, and current results available.  Pt presented for evaluation of persistent diarrheal. Pending stool cultures and CT. Pt will likely be discharged home pending unremarkable CT. Will continue to monitor.     Juana Zelaya PA-C      8:21 PM CT negative for acute process, pt made aware of these results will d/c with supportive care, oral rehydration, and close GI follow-up. Stool culture and C diff pending. Pt would like to start medication for c diff at this time rather than waiting on the result. Oral vancomycin sent to his pharmacy. GI follow-up encouraged. Return precautions advised. Pt agrees. Juana Zelaya PA-C     Disposition: d/c     Dictation disclaimer:  Please note that this dictation was completed with [employer], the computer voice recognition software.  Quite often unanticipated grammatical, syntax, homophones, and other interpretive errors are inadvertently transcribed by the computer software.  Please disregard these errors.  Please excuse any errors that have escaped final proofreading.       Juana Zelaya PA-C  03/03/25 2025

## 2025-05-30 ENCOUNTER — HOSPITAL ENCOUNTER (EMERGENCY)
Facility: HOSPITAL | Age: 65
Discharge: HOME | End: 2025-05-30
Attending: EMERGENCY MEDICINE
Payer: MEDICARE

## 2025-05-30 ENCOUNTER — APPOINTMENT (OUTPATIENT)
Facility: HOSPITAL | Age: 65
End: 2025-05-30
Payer: MEDICARE

## 2025-05-30 VITALS
HEART RATE: 88 BPM | DIASTOLIC BLOOD PRESSURE: 99 MMHG | SYSTOLIC BLOOD PRESSURE: 126 MMHG | BODY MASS INDEX: 21.19 KG/M2 | OXYGEN SATURATION: 98 % | RESPIRATION RATE: 16 BRPM | TEMPERATURE: 98.9 F | WEIGHT: 135 LBS | HEIGHT: 67 IN

## 2025-05-30 DIAGNOSIS — R06.02 SHORTNESS OF BREATH: Primary | ICD-10-CM

## 2025-05-30 DIAGNOSIS — J06.9 UPPER RESPIRATORY TRACT INFECTION, UNSPECIFIED TYPE: ICD-10-CM

## 2025-05-30 LAB
ALBUMIN SERPL-MCNC: 3.2 G/DL (ref 3.4–5)
ALBUMIN/GLOB SERPL: 1 (ref 0.8–1.7)
ALP SERPL-CCNC: 78 U/L (ref 45–117)
ALT SERPL-CCNC: 28 U/L (ref 10–50)
ANION GAP SERPL CALC-SCNC: 13 MMOL/L (ref 3–18)
AST SERPL-CCNC: 43 U/L (ref 10–38)
BASOPHILS # BLD: 0.01 K/UL (ref 0–0.1)
BASOPHILS NFR BLD: 0.2 % (ref 0–2)
BILIRUB SERPL-MCNC: 0.4 MG/DL (ref 0.2–1)
BUN SERPL-MCNC: 19 MG/DL (ref 6–23)
BUN/CREAT SERPL: 11 (ref 12–20)
CALCIUM SERPL-MCNC: 9.1 MG/DL (ref 8.5–10.1)
CHLORIDE SERPL-SCNC: 106 MMOL/L (ref 98–107)
CO2 SERPL-SCNC: 20 MMOL/L (ref 21–32)
CREAT SERPL-MCNC: 1.79 MG/DL (ref 0.6–1.3)
DIFFERENTIAL METHOD BLD: ABNORMAL
EKG ATRIAL RATE: 86 BPM
EKG DIAGNOSIS: NORMAL
EKG P AXIS: 70 DEGREES
EKG P-R INTERVAL: 196 MS
EKG Q-T INTERVAL: 408 MS
EKG QRS DURATION: 98 MS
EKG QTC CALCULATION (BAZETT): 488 MS
EKG R AXIS: -21 DEGREES
EKG T AXIS: 145 DEGREES
EKG VENTRICULAR RATE: 86 BPM
EOSINOPHIL # BLD: 0.04 K/UL (ref 0–0.4)
EOSINOPHIL NFR BLD: 0.8 % (ref 0–5)
ERYTHROCYTE [DISTWIDTH] IN BLOOD BY AUTOMATED COUNT: 12.5 % (ref 11.6–14.5)
FLUAV RNA SPEC QL NAA+PROBE: NOT DETECTED
FLUBV RNA SPEC QL NAA+PROBE: NOT DETECTED
GLOBULIN SER CALC-MCNC: 3.3 G/DL (ref 2–4)
GLUCOSE SERPL-MCNC: 187 MG/DL (ref 74–108)
HCT VFR BLD AUTO: 34.7 % (ref 36–48)
HGB BLD-MCNC: 10.9 G/DL (ref 13–16)
IMM GRANULOCYTES # BLD AUTO: 0.01 K/UL (ref 0–0.04)
IMM GRANULOCYTES NFR BLD AUTO: 0.2 % (ref 0–0.5)
LYMPHOCYTES # BLD: 1.65 K/UL (ref 0.9–3.6)
LYMPHOCYTES NFR BLD: 32.9 % (ref 21–52)
MCH RBC QN AUTO: 27.4 PG (ref 24–34)
MCHC RBC AUTO-ENTMCNC: 31.4 G/DL (ref 31–37)
MCV RBC AUTO: 87.2 FL (ref 78–100)
MONOCYTES # BLD: 0.65 K/UL (ref 0.05–1.2)
MONOCYTES NFR BLD: 13 % (ref 3–10)
NEUTS SEG # BLD: 2.65 K/UL (ref 1.8–8)
NEUTS SEG NFR BLD: 52.9 % (ref 40–73)
NRBC # BLD: 0 K/UL (ref 0–0.01)
NRBC BLD-RTO: 0 PER 100 WBC
NT PRO BNP: ABNORMAL PG/ML (ref 36–900)
PLATELET # BLD AUTO: 223 K/UL (ref 135–420)
PMV BLD AUTO: 10.5 FL (ref 9.2–11.8)
POTASSIUM SERPL-SCNC: 3.8 MMOL/L (ref 3.5–5.5)
PROT SERPL-MCNC: 6.5 G/DL (ref 6.4–8.2)
RBC # BLD AUTO: 3.98 M/UL (ref 4.35–5.65)
SARS-COV-2 RNA RESP QL NAA+PROBE: NOT DETECTED
SODIUM SERPL-SCNC: 139 MMOL/L (ref 136–145)
SOURCE: NORMAL
TROPONIN T SERPL HS-MCNC: 42.9 NG/L (ref 0–22)
TROPONIN T SERPL HS-MCNC: 52 NG/L (ref 0–22)
WBC # BLD AUTO: 5 K/UL (ref 4.6–13.2)

## 2025-05-30 PROCEDURE — 80053 COMPREHEN METABOLIC PANEL: CPT

## 2025-05-30 PROCEDURE — 99285 EMERGENCY DEPT VISIT HI MDM: CPT

## 2025-05-30 PROCEDURE — 71046 X-RAY EXAM CHEST 2 VIEWS: CPT

## 2025-05-30 PROCEDURE — 84484 ASSAY OF TROPONIN QUANT: CPT

## 2025-05-30 PROCEDURE — 93005 ELECTROCARDIOGRAM TRACING: CPT | Performed by: EMERGENCY MEDICINE

## 2025-05-30 PROCEDURE — 83880 ASSAY OF NATRIURETIC PEPTIDE: CPT

## 2025-05-30 PROCEDURE — 93010 ELECTROCARDIOGRAM REPORT: CPT | Performed by: INTERNAL MEDICINE

## 2025-05-30 PROCEDURE — 87636 SARSCOV2 & INF A&B AMP PRB: CPT

## 2025-05-30 PROCEDURE — 85025 COMPLETE CBC W/AUTO DIFF WBC: CPT

## 2025-05-30 RX ORDER — GUAIFENESIN/DEXTROMETHORPHAN 100-10MG/5
5 SYRUP ORAL 3 TIMES DAILY PRN
Qty: 120 ML | Refills: 0 | Status: SHIPPED | OUTPATIENT
Start: 2025-05-30 | End: 2025-06-09

## 2025-05-30 RX ORDER — ACETAMINOPHEN 325 MG/1
650 TABLET ORAL EVERY 6 HOURS PRN
Qty: 120 TABLET | Refills: 0 | Status: SHIPPED | OUTPATIENT
Start: 2025-05-30

## 2025-05-30 ASSESSMENT — PAIN SCALES - GENERAL: PAINLEVEL_OUTOF10: 7

## 2025-05-30 ASSESSMENT — PAIN - FUNCTIONAL ASSESSMENT: PAIN_FUNCTIONAL_ASSESSMENT: 0-10

## 2025-05-30 NOTE — DISCHARGE INSTRUCTIONS
Return to the ER for severe pain, difficulty breathing, inability tolerate food of his mouth, new fever, any other concerning signs or symptoms.  Please follow-up with your PCM today to obtain your missing prescriptions, take all your medications as prescribed.

## 2025-05-30 NOTE — FLOWSHEET NOTE
05/30/25 1003   Departure Condition   Mobility at Departure Ambulatory   Ambulatory Mobility With No Difficulty   Departure Mode By self   Discharged To Home   Patient Teaching Discharge instructions reviewed;Patient verbalized understanding

## 2025-05-30 NOTE — ED TRIAGE NOTES
Pt arrives ambulatory to triage reporting chest pain for the last two days   Pt also states that he feels like he has a cold

## 2025-06-09 ENCOUNTER — TRANSCRIBE ORDERS (OUTPATIENT)
Facility: HOSPITAL | Age: 65
End: 2025-06-09

## 2025-06-09 DIAGNOSIS — Z87.891 FORMER HEAVY CIGARETTE SMOKER (20-39 PER DAY): Primary | ICD-10-CM

## 2025-06-16 ENCOUNTER — OFFICE VISIT (OUTPATIENT)
Age: 65
End: 2025-06-16
Payer: MEDICARE

## 2025-06-16 VITALS
BODY MASS INDEX: 22.98 KG/M2 | DIASTOLIC BLOOD PRESSURE: 86 MMHG | RESPIRATION RATE: 16 BRPM | OXYGEN SATURATION: 100 % | HEART RATE: 79 BPM | TEMPERATURE: 97.1 F | WEIGHT: 146.4 LBS | HEIGHT: 67 IN | SYSTOLIC BLOOD PRESSURE: 132 MMHG

## 2025-06-16 DIAGNOSIS — K40.90 RIGHT INGUINAL HERNIA: Primary | ICD-10-CM

## 2025-06-16 PROCEDURE — 4004F PT TOBACCO SCREEN RCVD TLK: CPT | Performed by: SURGERY

## 2025-06-16 PROCEDURE — 1124F ACP DISCUSS-NO DSCNMKR DOCD: CPT | Performed by: SURGERY

## 2025-06-16 PROCEDURE — 3017F COLORECTAL CA SCREEN DOC REV: CPT | Performed by: SURGERY

## 2025-06-16 PROCEDURE — 99204 OFFICE O/P NEW MOD 45 MIN: CPT | Performed by: SURGERY

## 2025-06-16 PROCEDURE — 3075F SYST BP GE 130 - 139MM HG: CPT | Performed by: SURGERY

## 2025-06-16 PROCEDURE — G8420 CALC BMI NORM PARAMETERS: HCPCS | Performed by: SURGERY

## 2025-06-16 PROCEDURE — G8427 DOCREV CUR MEDS BY ELIG CLIN: HCPCS | Performed by: SURGERY

## 2025-06-16 PROCEDURE — 3079F DIAST BP 80-89 MM HG: CPT | Performed by: SURGERY

## 2025-06-16 NOTE — PROGRESS NOTES
Phong LOUISE Lance is a 64 y.o. male (: 1960)     Chief Complaint   Patient presents with    New Patient     Right inguinal hernia        Medication list and allergies have been reviewed with Phong Lucas and updated as of today's date.     I have gone over all Medical, Surgical and Social History with Phong Lucas and updated/added the information accordingly.

## 2025-06-17 ENCOUNTER — PREP FOR PROCEDURE (OUTPATIENT)
Age: 65
End: 2025-06-17

## 2025-06-17 DIAGNOSIS — K40.90 RIGHT INGUINAL HERNIA: ICD-10-CM

## 2025-06-17 ASSESSMENT — ENCOUNTER SYMPTOMS
CONSTIPATION: 0
CHEST TIGHTNESS: 0
DIARRHEA: 0
VOMITING: 0
NAUSEA: 0
SHORTNESS OF BREATH: 0
ABDOMINAL PAIN: 1

## 2025-06-17 NOTE — H&P (VIEW-ONLY)
Darell product )  Septal infarct (cited on or before 30-MAY-2025)  Nonspecific T wave abnormality  Abnormal ECG  When compared with ECG of 27-JAN-2025 12:20,  premature ventricular complexes are now present  Vent. rate has increased BY  38 BPM  Confirmed by Yobani Yanez MD ([id number]) on 5/30/2025 8:39:22 AM     • Source 05/30/2025 Nasopharyngeal    Final   • SARS-CoV-2, PCR 05/30/2025 Not detected  NOTD   Final    Not Detected results do not preclude SARS-CoV-2 infection and should not be used as the sole basis for patient management decisions.Results must be combined with clinical observations, patient history, and epidemiological information.   • Rapid Influenza A By PCR 05/30/2025 Not detected  NOTD   Final   • Rapid Influenza B By PCR 05/30/2025 Not detected  NOTD   Final    Comment: Testing was performed using al Ligia SARS-CoV-2 and Influenza A/B nucleic acid assay.  This test is a multiplex Real-Time Reverse Transcriptase Polymerase Chain Reaction (RT-PCR) based in vitro diagnostic test intended for the qualitative detection of nucleic acids from SARS-CoV-2, Influenza A, and Influenza B in nasopharyngeal specimens.     • WBC 05/30/2025 5.0  4.6 - 13.2 K/uL Final   • RBC 05/30/2025 3.98 (L)  4.35 - 5.65 M/uL Final   • Hemoglobin 05/30/2025 10.9 (L)  13.0 - 16.0 g/dL Final   • Hematocrit 05/30/2025 34.7 (L)  36.0 - 48.0 % Final   • MCV 05/30/2025 87.2  78.0 - 100.0 FL Final   • MCH 05/30/2025 27.4  24.0 - 34.0 PG Final   • MCHC 05/30/2025 31.4  31.0 - 37.0 g/dL Final   • RDW 05/30/2025 12.5  11.6 - 14.5 % Final   • Platelets 05/30/2025 223  135 - 420 K/uL Final   • MPV 05/30/2025 10.5  9.2 - 11.8 FL Final   • Nucleated RBCs 05/30/2025 0.0  0  WBC Final   • nRBC 05/30/2025 0.00  0.00 - 0.01 K/uL Final   • Neutrophils % 05/30/2025 52.9  40.0 - 73.0 % Final   • Lymphocytes % 05/30/2025 32.9  21.0 - 52.0 % Final   • Monocytes % 05/30/2025 13.0 (H)  3.0 - 10.0 % Final   • Eosinophils % 05/30/2025 0.8  0.0 -

## 2025-06-17 NOTE — PROGRESS NOTES
CC:   Chief Complaint   Patient presents with   • New Patient     Right inguinal hernia         Assessment:    ICD-10-CM    1. Right inguinal hernia  K40.90 SCHEDULE SURGERY          Plan: He has a large symptomatic right inguinal hernia containing small bowel. I recommended open right inguinal hernia repair with mesh. The risks and benefits of the procedure were reviewed with the patient including infection, bleeding, need for repeat procedure, injury to surrounding structures, recurrent hernia, chronic pain. Post operative expectations including lifting restrictions were reviewed. Questions were answered. We will obtain cardiac clearance.        HPI:  Phong Lucas is a 64 y.o. male who is referred by Sky Avalos MD for right inguinal hernia. He states he noticed small amount of bulging in the right groin 6 months ago and now it has become very large and painful. Pain is non radiating.  He hears swooshing sounds in the groin. No difficulty with bowel movements or urination. He reports stable weight. He denies any previous hernia repairs in the past. He states he remains physically active but does not recall lifting anything heavy.     Allergies:  No Known Allergies    Medication Review:  Current Outpatient Medications on File Prior to Visit   Medication Sig Dispense Refill   • acetaminophen (AMINOFEN) 325 MG tablet Take 2 tablets by mouth every 6 hours as needed for Pain 120 tablet 0   • amLODIPine (NORVASC) 5 MG tablet Take 1 tablet by mouth daily 90 tablet 0   • sacubitril-valsartan (ENTRESTO) 49-51 MG per tablet Take 1 tablet by mouth 2 times daily 60 tablet 0   • hydrALAZINE (APRESOLINE) 25 MG tablet Take 1 tablet by mouth every 8 hours 90 tablet 3   • ferrous sulfate (FE TABS) 325 (65 Fe) MG EC tablet Take 1 tablet by mouth 2 times daily 90 tablet 3   • potassium chloride (KLOR-CON M) 20 MEQ extended release tablet Take 2 tablets by mouth daily (with breakfast) 60 tablet 3   • aspirin 81 MG chewable

## 2025-06-24 ENCOUNTER — TELEPHONE (OUTPATIENT)
Age: 65
End: 2025-06-24

## 2025-06-24 NOTE — TELEPHONE ENCOUNTER
Patient is having an open right hernia repair with mesh under general anesthesia on 6/26/25. He was last seen 2/13/25. Does he need to stop ASA for surgery?

## 2025-06-24 NOTE — PERIOP NOTE
Instructions for your surgery at [facility]      Today's Date:  6/24/2025      Patient's Name:  Phong Lucas           Surgery Date:  6/26/2025              Please enter the main entrance of the hospital and check-in at the front security desk located in the lobby. They will direct you to the area to report for your surgery.     Do NOT eat or drink anything, including candy, gum, or ice chips after midnight prior to your surgery, unless you have specific instructions from your surgeon or anesthesia provider to do so.  Brush your teeth before coming to the hospital. You may swish with water, but do not swallow.  No smoking/Vaping/E-Cigarettes 24 hours prior to the day of surgery.  No alcohol 24 hours prior to the day of surgery.  No recreational drugs for one week prior to the day of surgery.  Bring Photo ID, Insurance information, and Co-pay if required on day of surgery.  Bring in pertinent legal documents, such as, Medical Power of , DNR, Advance Directive, etc.  Leave all valuables, including money/purse, weapons at home.  Remove all jewelry, including ALL body piercings, nail polish, acrylic nails, and makeup (including mascara); no lotions, powders, deodorant, or perfume/cologne/after shave on the skin.  Follow instruction for Hibiclens washes and CHG wipes from surgeon's office.   Glasses and dentures may be worn to the hospital. They must be removed prior to surgery. Please bring case/container for glasses or dentures.   Contact lenses should not be worn on day of surgery.   Call your doctor's office if symptoms of a cold or illness develop within 24-48 hours prior to your surgery.  Call your doctor's office if you have any questions concerning insurance or co-pays.  15. AN ADULT (relative or friend 18 years or older) MUST DRIVE YOU HOME AFTER YOUR SURGERY.  16. Please make arrangements for a responsible adult (18 years or older) to be with you for 24 hours after your

## 2025-06-25 ENCOUNTER — ANESTHESIA EVENT (OUTPATIENT)
Facility: HOSPITAL | Age: 65
End: 2025-06-25
Payer: MEDICARE

## 2025-06-26 ENCOUNTER — HOSPITAL ENCOUNTER (OUTPATIENT)
Facility: HOSPITAL | Age: 65
Setting detail: OUTPATIENT SURGERY
Discharge: HOME | End: 2025-06-26
Attending: SURGERY | Admitting: SURGERY
Payer: MEDICARE

## 2025-06-26 ENCOUNTER — ANESTHESIA (OUTPATIENT)
Facility: HOSPITAL | Age: 65
End: 2025-06-26
Payer: MEDICARE

## 2025-06-26 VITALS
TEMPERATURE: 97.5 F | SYSTOLIC BLOOD PRESSURE: 143 MMHG | BODY MASS INDEX: 23.75 KG/M2 | WEIGHT: 147.8 LBS | DIASTOLIC BLOOD PRESSURE: 89 MMHG | OXYGEN SATURATION: 94 % | RESPIRATION RATE: 16 BRPM | HEART RATE: 63 BPM | HEIGHT: 66 IN

## 2025-06-26 DIAGNOSIS — K40.90 RIGHT INGUINAL HERNIA: Primary | ICD-10-CM

## 2025-06-26 LAB
AMPHET UR QL SCN: NEGATIVE
ANION GAP SERPL CALC-SCNC: 10 MMOL/L (ref 3–18)
BARBITURATES UR QL SCN: NEGATIVE
BENZODIAZ UR QL: NEGATIVE
BUN SERPL-MCNC: 15 MG/DL (ref 6–23)
BUN/CREAT SERPL: 9 (ref 12–20)
CALCIUM SERPL-MCNC: 9 MG/DL (ref 8.5–10.1)
CANNABINOIDS UR QL SCN: NEGATIVE
CHLORIDE SERPL-SCNC: 107 MMOL/L (ref 98–107)
CO2 SERPL-SCNC: 25 MMOL/L (ref 21–32)
COCAINE UR QL SCN: NEGATIVE
CREAT SERPL-MCNC: 1.65 MG/DL (ref 0.6–1.3)
FENTANYL: NEGATIVE
GLUCOSE BLD STRIP.AUTO-MCNC: 102 MG/DL (ref 70–110)
GLUCOSE SERPL-MCNC: 96 MG/DL (ref 74–108)
Lab: NORMAL
METHADONE UR QL: NEGATIVE
OPIATES UR QL: NEGATIVE
OXYCODONE UR QL SCN: NEGATIVE
POTASSIUM SERPL-SCNC: 3.7 MMOL/L (ref 3.5–5.5)
SODIUM SERPL-SCNC: 142 MMOL/L (ref 136–145)

## 2025-06-26 PROCEDURE — C1781 MESH (IMPLANTABLE): HCPCS | Performed by: SURGERY

## 2025-06-26 PROCEDURE — 80048 BASIC METABOLIC PNL TOTAL CA: CPT

## 2025-06-26 PROCEDURE — 2500000003 HC RX 250 WO HCPCS: Performed by: ANESTHESIOLOGY

## 2025-06-26 PROCEDURE — 7100000001 HC PACU RECOVERY - ADDTL 15 MIN: Performed by: SURGERY

## 2025-06-26 PROCEDURE — 3600000013 HC SURGERY LEVEL 3 ADDTL 15MIN: Performed by: SURGERY

## 2025-06-26 PROCEDURE — 2709999900 HC NON-CHARGEABLE SUPPLY: Performed by: SURGERY

## 2025-06-26 PROCEDURE — 3600000003 HC SURGERY LEVEL 3 BASE: Performed by: SURGERY

## 2025-06-26 PROCEDURE — 2580000003 HC RX 258: Performed by: NURSE ANESTHETIST, CERTIFIED REGISTERED

## 2025-06-26 PROCEDURE — 6360000002 HC RX W HCPCS: Performed by: SURGERY

## 2025-06-26 PROCEDURE — 3700000001 HC ADD 15 MINUTES (ANESTHESIA): Performed by: SURGERY

## 2025-06-26 PROCEDURE — 3700000000 HC ANESTHESIA ATTENDED CARE: Performed by: SURGERY

## 2025-06-26 PROCEDURE — 6370000000 HC RX 637 (ALT 250 FOR IP): Performed by: NURSE ANESTHETIST, CERTIFIED REGISTERED

## 2025-06-26 PROCEDURE — 49505 PRP I/HERN INIT REDUC >5 YR: CPT | Performed by: SURGERY

## 2025-06-26 PROCEDURE — 7100000000 HC PACU RECOVERY - FIRST 15 MIN: Performed by: SURGERY

## 2025-06-26 PROCEDURE — 7100000011 HC PHASE II RECOVERY - ADDTL 15 MIN: Performed by: SURGERY

## 2025-06-26 PROCEDURE — 2500000003 HC RX 250 WO HCPCS: Performed by: SURGERY

## 2025-06-26 PROCEDURE — 80307 DRUG TEST PRSMV CHEM ANLYZR: CPT

## 2025-06-26 PROCEDURE — 6360000002 HC RX W HCPCS: Performed by: ANESTHESIOLOGY

## 2025-06-26 PROCEDURE — 82962 GLUCOSE BLOOD TEST: CPT

## 2025-06-26 PROCEDURE — 7100000010 HC PHASE II RECOVERY - FIRST 15 MIN: Performed by: SURGERY

## 2025-06-26 DEVICE — MESH SURG W3.5XL6IN POLY SELF FIXATING RECT W/ RESRB PLA: Type: IMPLANTABLE DEVICE | Site: GROIN | Status: FUNCTIONAL

## 2025-06-26 RX ORDER — SODIUM CHLORIDE, SODIUM LACTATE, POTASSIUM CHLORIDE, CALCIUM CHLORIDE 600; 310; 30; 20 MG/100ML; MG/100ML; MG/100ML; MG/100ML
INJECTION, SOLUTION INTRAVENOUS CONTINUOUS
Status: DISCONTINUED | OUTPATIENT
Start: 2025-06-26 | End: 2025-06-26 | Stop reason: HOSPADM

## 2025-06-26 RX ORDER — FAMOTIDINE 20 MG/1
20 TABLET, FILM COATED ORAL ONCE
Status: COMPLETED | OUTPATIENT
Start: 2025-06-26 | End: 2025-06-26

## 2025-06-26 RX ORDER — SODIUM CHLORIDE 9 MG/ML
INJECTION, SOLUTION INTRAVENOUS PRN
Status: ACTIVE | OUTPATIENT
Start: 2025-06-26

## 2025-06-26 RX ORDER — SUCCINYLCHOLINE/SOD CL,ISO/PF 100 MG/5ML
SYRINGE (ML) INTRAVENOUS
Status: DISCONTINUED | OUTPATIENT
Start: 2025-06-26 | End: 2025-06-26 | Stop reason: SDUPTHER

## 2025-06-26 RX ORDER — PROMETHAZINE HYDROCHLORIDE 12.5 MG/1
12.5 TABLET ORAL ONCE
Status: COMPLETED | OUTPATIENT
Start: 2025-06-26 | End: 2025-06-26

## 2025-06-26 RX ORDER — SODIUM CHLORIDE 0.9 % (FLUSH) 0.9 %
5-40 SYRINGE (ML) INJECTION PRN
Status: DISCONTINUED | OUTPATIENT
Start: 2025-06-26 | End: 2025-06-26 | Stop reason: HOSPADM

## 2025-06-26 RX ORDER — LIDOCAINE HYDROCHLORIDE 20 MG/ML
INJECTION, SOLUTION EPIDURAL; INFILTRATION; INTRACAUDAL; PERINEURAL
Status: DISCONTINUED | OUTPATIENT
Start: 2025-06-26 | End: 2025-06-26 | Stop reason: SDUPTHER

## 2025-06-26 RX ORDER — FENTANYL CITRATE 50 UG/ML
50 INJECTION, SOLUTION INTRAMUSCULAR; INTRAVENOUS EVERY 5 MIN PRN
Status: ACTIVE | OUTPATIENT
Start: 2025-06-26

## 2025-06-26 RX ORDER — NALOXONE HYDROCHLORIDE 0.4 MG/ML
INJECTION, SOLUTION INTRAMUSCULAR; INTRAVENOUS; SUBCUTANEOUS PRN
Status: ACTIVE | OUTPATIENT
Start: 2025-06-26

## 2025-06-26 RX ORDER — SODIUM CHLORIDE 9 MG/ML
INJECTION, SOLUTION INTRAVENOUS PRN
Status: DISCONTINUED | OUTPATIENT
Start: 2025-06-26 | End: 2025-06-26 | Stop reason: HOSPADM

## 2025-06-26 RX ORDER — FENTANYL CITRATE 50 UG/ML
INJECTION, SOLUTION INTRAMUSCULAR; INTRAVENOUS
Status: DISCONTINUED | OUTPATIENT
Start: 2025-06-26 | End: 2025-06-26 | Stop reason: SDUPTHER

## 2025-06-26 RX ORDER — PROPOFOL 10 MG/ML
INJECTION, EMULSION INTRAVENOUS
Status: DISCONTINUED | OUTPATIENT
Start: 2025-06-26 | End: 2025-06-26 | Stop reason: SDUPTHER

## 2025-06-26 RX ORDER — ONDANSETRON 2 MG/ML
INJECTION INTRAMUSCULAR; INTRAVENOUS
Status: DISCONTINUED | OUTPATIENT
Start: 2025-06-26 | End: 2025-06-26 | Stop reason: SDUPTHER

## 2025-06-26 RX ORDER — HYDROCODONE BITARTRATE AND ACETAMINOPHEN 5; 325 MG/1; MG/1
1 TABLET ORAL EVERY 4 HOURS PRN
Qty: 18 TABLET | Refills: 0 | Status: SHIPPED | OUTPATIENT
Start: 2025-06-26 | End: 2025-06-29

## 2025-06-26 RX ORDER — SODIUM CHLORIDE 0.9 % (FLUSH) 0.9 %
5-40 SYRINGE (ML) INJECTION EVERY 12 HOURS SCHEDULED
Status: DISCONTINUED | OUTPATIENT
Start: 2025-06-26 | End: 2025-06-26 | Stop reason: HOSPADM

## 2025-06-26 RX ORDER — LIDOCAINE HYDROCHLORIDE 10 MG/ML
1 INJECTION, SOLUTION EPIDURAL; INFILTRATION; INTRACAUDAL; PERINEURAL
Status: DISCONTINUED | OUTPATIENT
Start: 2025-06-26 | End: 2025-06-26 | Stop reason: HOSPADM

## 2025-06-26 RX ORDER — SODIUM CHLORIDE 0.9 % (FLUSH) 0.9 %
5-40 SYRINGE (ML) INJECTION EVERY 12 HOURS SCHEDULED
Status: ACTIVE | OUTPATIENT
Start: 2025-06-26

## 2025-06-26 RX ORDER — SODIUM CHLORIDE 0.9 % (FLUSH) 0.9 %
5-40 SYRINGE (ML) INJECTION PRN
Status: ACTIVE | OUTPATIENT
Start: 2025-06-26

## 2025-06-26 RX ORDER — KETOROLAC TROMETHAMINE 15 MG/ML
INJECTION, SOLUTION INTRAMUSCULAR; INTRAVENOUS
Status: DISCONTINUED | OUTPATIENT
Start: 2025-06-26 | End: 2025-06-26 | Stop reason: SDUPTHER

## 2025-06-26 RX ORDER — ROCURONIUM BROMIDE 10 MG/ML
INJECTION, SOLUTION INTRAVENOUS
Status: DISCONTINUED | OUTPATIENT
Start: 2025-06-26 | End: 2025-06-26 | Stop reason: SDUPTHER

## 2025-06-26 RX ADMIN — PROMETHAZINE HYDROCHLORIDE 12.5 MG: 12.5 TABLET ORAL at 06:27

## 2025-06-26 RX ADMIN — ROCURONIUM BROMIDE 10 MG: 10 INJECTION, SOLUTION INTRAVENOUS at 07:37

## 2025-06-26 RX ADMIN — KETOROLAC TROMETHAMINE 15 MG: 15 INJECTION, SOLUTION INTRAMUSCULAR; INTRAVENOUS at 07:46

## 2025-06-26 RX ADMIN — ROCURONIUM BROMIDE 5 MG: 10 INJECTION, SOLUTION INTRAVENOUS at 07:29

## 2025-06-26 RX ADMIN — FAMOTIDINE 20 MG: 20 TABLET, FILM COATED ORAL at 06:27

## 2025-06-26 RX ADMIN — WATER 2000 MG: 1 INJECTION INTRAMUSCULAR; INTRAVENOUS; SUBCUTANEOUS at 07:32

## 2025-06-26 RX ADMIN — Medication 100 MG: at 07:29

## 2025-06-26 RX ADMIN — SUGAMMADEX 200 MG: 100 INJECTION, SOLUTION INTRAVENOUS at 07:51

## 2025-06-26 RX ADMIN — FENTANYL CITRATE 100 MCG: 50 INJECTION INTRAMUSCULAR; INTRAVENOUS at 07:29

## 2025-06-26 RX ADMIN — SODIUM CHLORIDE, SODIUM LACTATE, POTASSIUM CHLORIDE, AND CALCIUM CHLORIDE: .6; .31; .03; .02 INJECTION, SOLUTION INTRAVENOUS at 06:39

## 2025-06-26 RX ADMIN — ONDANSETRON 4 MG: 2 INJECTION, SOLUTION INTRAMUSCULAR; INTRAVENOUS at 07:48

## 2025-06-26 RX ADMIN — LIDOCAINE HYDROCHLORIDE 60 MG: 20 INJECTION, SOLUTION EPIDURAL; INFILTRATION; INTRACAUDAL; PERINEURAL at 07:29

## 2025-06-26 RX ADMIN — PROPOFOL 150 MG: 10 INJECTION, EMULSION INTRAVENOUS at 07:29

## 2025-06-26 ASSESSMENT — PAIN - FUNCTIONAL ASSESSMENT: PAIN_FUNCTIONAL_ASSESSMENT: 0-10

## 2025-06-26 ASSESSMENT — PAIN DESCRIPTION - PAIN TYPE: TYPE: SURGICAL PAIN

## 2025-06-26 ASSESSMENT — PAIN SCALES - GENERAL
PAINLEVEL_OUTOF10: 0

## 2025-06-26 NOTE — INTERVAL H&P NOTE
Update History & Physical    The patient's History and Physical of 6/26/2025 was reviewed with the patient and I examined the patient. There was no change. The surgical site was confirmed by the patient and me.     Plan: The risks, benefits, expected outcome, and alternative to the recommended procedure have been discussed with the patient. Patient understands and wants to proceed with the procedure.     Electronically signed by Elviar Green DO on 6/26/2025 at 6:39 AM

## 2025-06-26 NOTE — DISCHARGE INSTRUCTIONS
Post Operative Discharge Instructions    No driving for 24 hours after surgery and off of prescription pain medication.    Avoid activities that bump or cause jarring movements at the surgical site for 10 days.    No lifting more than 10-15 pounds for 6 weeks after surgery or until cleared for activity at your follow up.    Walking is encouraged after surgery.    Stairs are ok to climb.      DIET:    Diet as tolerated. Start with liquids then advance your diet based on how you feel.    No alcoholic beverages for 24 hours after surgery or while on antibiotics or pain mdications.    Drink plenty of water.        MEDICATIONS:    Use daily stool softners (over the counter such as Colace or Senekot) while on pain medications.     Resume pre-operative medications. If you are on any blood thinners see special instructions below.    Use prescriptions given or Tylenol, Ibuprofen as needed for pain.    Do not use more than 4000mg of Tylenol (acetaminophen) per day. Be aware this may be  in your prescription medication as well.    Be aware narcotic prescriptions are tightly controlled in the [facility]. If requiring more than one refill, a follow up appointment will be required.      WOUND CARE:      You have steri strips on your incisions, you may shower in 24 hours and pat dry. Leave steri strips on until they fall off on their own    Do not tub bathe, swim, or soak incisions until cleared to do so at your follow up.    Ice bag to the affected area; 20 minutes on and 20 minutes off if desired.      FOLLOW UP CARE:    You should have an appointment scheduled within 14 days after surgery. If this is not yet scheduled, call the office.  Any forms that you need filled out regarding your medical care can be brought to the office at follow up appointment of faxed to: [phone]        CALL DOCTOR IF:  Temperature is over 101 degrees, a slight fever can be normal 24-48 hour after surgery.  Nausea & vomiting that persists more

## 2025-06-26 NOTE — OP NOTE
Operative Note      Patient: Phong Lucas  YOB: 1960  MRN: [id number]    Date of Procedure: 6/26/2025    Pre-Op Diagnosis Codes:      * Right inguinal hernia [K40.90]    Post-Op Diagnosis: Same       Procedure(s):  OPEN RIGHT INGUINAL HERNIA REPAIR WITH MESH    Surgeon(s):  Elvira Green DO    Assistant:   Surgical Assistant: Ruben Meehan    Anesthesia: General    Estimated Blood Loss (mL): Minimal    Complications: None    Specimens:   * No specimens in log *    Implants:  * No implants in log *      Drains: * No LDAs found *    Findings:  Infection Present At Time Of Surgery (PATOS) (choose all levels that have infection present):  No infection present  Other Findings: see dictation    Detailed Description of Procedure:     After informed consent was obtained the patient was taken to the operating room and placed in the supine position.  General anesthesia was administered by the anesthetist to titrate to effect.  The right groin was prepped and draped in the usual sterile fashion and a timeout procedure was performed. Next using a 15 blade scalpel an oblique incision was made superior to the inguinal ligament. Bovie electrocautery was used to dissect through campers and scott's fascia. The external oblique aponeurosis was then sharply incised through the external ring. A penrose drain was then placed around the spermatic cord and contents. A thick chronic hernia sac was then  from the spermatic cord and reduced back into the abdomen after using a combination of blunt and bovie dissection. The inguinal floor was very patulous and reapproximated with 0 ethibond suture to ensure contents stayed reduced. A piece of progrip mesh was then cut to fit widely over the floor, pubic tubercle and then around the cord. It was secured at the pubic tubercle and inguinal ligament and internal oblique. The wound was irrigated and suctioned dry and found to be hemostatic. The external oblique

## 2025-06-26 NOTE — PERIOP NOTE
Patient /Family /Designee has been informed that [facility] is not responsible for patient belongings per policy and the signed [facility] Patient Agreement document.  Personal items should be sent home or checked in with security.  Patient /Family /Designee selected the following action:                            [x]  Send personal items home with a family member or friend                                                 []  Check in personal items with security, excluding clothing                            []  Maintain personal items at the bedside, against recommendation                                 by Albert Barrios [facility]                                   All items given to family at bedside Chito Lucas

## 2025-06-26 NOTE — ANESTHESIA POSTPROCEDURE EVALUATION
Department of Anesthesiology  Postprocedure Note    Patient: Phong Lucas  MRN: [id number]  YOB: 1960  Date of evaluation: 6/26/2025    Procedure Summary       Date: 06/26/25 Room / Location: [facility] MAIN 01 / [facility] MAIN OR    Anesthesia Start: 0726 Anesthesia Stop: 0813    Procedure: OPEN RIGHT INGUINAL HERNIA REPAIR WITH MESH (Right: Groin) Diagnosis:       Right inguinal hernia      (Right inguinal hernia [K40.90])    Surgeons: Elvira Green DO Responsible Provider: Lizzy Esquivel MD    Anesthesia Type: general ASA Status: 3            Anesthesia Type: No value filed.    Debbie Phase I: Debbie Score: 10    Debbie Phase II: Debbie Score: 10    Anesthesia Post Evaluation    Patient location during evaluation: PACU  Patient participation: complete - patient participated  Level of consciousness: awake and alert  Pain score: 2  Airway patency: patent  Nausea & Vomiting: no nausea and no vomiting  Cardiovascular status: hemodynamically stable  Respiratory status: acceptable  Hydration status: euvolemic  Multimodal analgesia pain management approach  Pain management: adequate    No notable events documented.

## 2025-06-26 NOTE — ANESTHESIA PRE PROCEDURE
(HCC) I50.20   • CKD (chronic kidney disease) stage 3, GFR 30-59 ml/min (HCC) N18.30   • Septic shock (HCC) A41.9, R65.21   • Right inguinal hernia K40.90       Past Medical History:        Diagnosis Date   • Cardiomyopathy (HCC)    • Cocaine abuse (HCC)    • HTN (hypertension)    • Hypertension    • Stroke (HCC)    • Stroke risk 1986,2004, 2009    pt stated he had a stroke in above dates       Past Surgical History:        Procedure Laterality Date   • CARDIAC PROCEDURE Right 3/10/2023    LEFT HEART CATH / CORONARY ANGIOGRAPHY performed by Yobani LAKHANI MD at [facility] CARDIAC CATH LAB   • CARDIAC PROCEDURE Right 3/10/2023    Left ventriculography performed by Yobani LAKHANI MD at [facility] CARDIAC CATH LAB   • CYST REMOVAL  1993    right front of the forehead   • HERNIA REPAIR  1993       Social History:    Social History     Tobacco Use   • Smoking status: Some Days     Current packs/day: 0.25     Types: Cigarettes   • Smokeless tobacco: Never   Substance Use Topics   • Alcohol use: Not Currently     Alcohol/week: 2.0 standard drinks of alcohol                                Ready to quit: Not Answered  Counseling given: Not Answered      Vital Signs (Current):   Vitals:    06/23/25 1545 06/26/25 0559 06/26/25 0612   BP:   (!) 126/90   Pulse:   60   Resp:   19   Temp:   98.1 °F (36.7 °C)   TempSrc:   Oral   SpO2:   97%   Weight: 66.2 kg (146 lb) 67 kg (147 lb 12.8 oz)    Height: 1.676 m (5' 6\")                                                BP Readings from Last 3 Encounters:   06/26/25 (!) 126/90   06/16/25 132/86   05/30/25 (!) 126/99       NPO Status: Time of last liquid consumption: 2230                        Time of last solid consumption: 2230                        Date of last liquid consumption: 06/25/25                        Date of last solid food consumption: 06/25/25    BMI:   Wt Readings from Last 3 Encounters:   06/26/25 67 kg (147 lb 12.8 oz)   06/16/25 66.4 kg (146 lb 6.4 oz)   05/30/25 61.2 kg (135 lb)

## 2025-07-08 ENCOUNTER — OFFICE VISIT (OUTPATIENT)
Age: 65
End: 2025-07-08

## 2025-07-08 VITALS
DIASTOLIC BLOOD PRESSURE: 86 MMHG | RESPIRATION RATE: 18 BRPM | HEART RATE: 86 BPM | SYSTOLIC BLOOD PRESSURE: 146 MMHG | BODY MASS INDEX: 24.27 KG/M2 | HEIGHT: 66 IN | OXYGEN SATURATION: 99 % | TEMPERATURE: 96.9 F | WEIGHT: 151 LBS

## 2025-07-08 DIAGNOSIS — K40.90 RIGHT INGUINAL HERNIA: Primary | ICD-10-CM

## 2025-07-08 PROCEDURE — 99024 POSTOP FOLLOW-UP VISIT: CPT

## 2025-07-08 ASSESSMENT — ENCOUNTER SYMPTOMS
NAUSEA: 0
COLOR CHANGE: 0
ABDOMINAL PAIN: 0
SHORTNESS OF BREATH: 0
VOMITING: 0

## 2025-07-08 NOTE — PROGRESS NOTES
Phong Lucas is a 64 y.o. male (: 1960)     Chief Complaint   Patient presents with    Post-Op Check     Right inguinal hernia        Medication list and allergies have been reviewed with Phong DANI Lucas and updated as of today's date.     I have gone over all Medical, Surgical and Social History with Phong Lucas and up...    1. Have you been to the ER, urgent care clinic since your last visit?  Hospitalized since your last visit?No    2. Have you seen or consulted any other health care providers outside of the [facility] since your last visit?  Include any pap smears or colon screening. No     
exam.  Swelling present over inguinal region   Skin:     General: Skin is warm and dry.   Neurological:      Mental Status: He is alert and oriented to person, place, and time.   Psychiatric:         Mood and Affect: Mood normal.         Behavior: Behavior normal.         Thought Content: Thought content normal.         Judgment: Judgment normal.         I have reviewed the information entered by the clinical staff and/or patient and verified it as accurate or edited where necessary.      Electronically signed by:    Jodie Jensen MPA, PATristinC

## 2025-08-01 ENCOUNTER — HOSPITAL ENCOUNTER (EMERGENCY)
Facility: HOSPITAL | Age: 65
Discharge: HOME | End: 2025-08-01
Attending: EMERGENCY MEDICINE
Payer: MEDICARE

## 2025-08-01 VITALS
RESPIRATION RATE: 18 BRPM | HEIGHT: 67 IN | TEMPERATURE: 98.2 F | SYSTOLIC BLOOD PRESSURE: 171 MMHG | BODY MASS INDEX: 23.54 KG/M2 | DIASTOLIC BLOOD PRESSURE: 102 MMHG | HEART RATE: 79 BPM | WEIGHT: 150 LBS | OXYGEN SATURATION: 97 %

## 2025-08-01 DIAGNOSIS — B35.4 TINEA CORPORIS: ICD-10-CM

## 2025-08-01 DIAGNOSIS — B35.0 TINEA CAPITIS: Primary | ICD-10-CM

## 2025-08-01 PROCEDURE — 99283 EMERGENCY DEPT VISIT LOW MDM: CPT

## 2025-08-01 RX ORDER — KETOCONAZOLE 20 MG/ML
SHAMPOO, SUSPENSION TOPICAL
Qty: 180 ML | Refills: 1 | Status: SHIPPED | OUTPATIENT
Start: 2025-08-01 | End: 2025-08-01

## 2025-08-01 RX ORDER — FLUCONAZOLE 150 MG/1
150 TABLET ORAL WEEKLY
Qty: 2 TABLET | Refills: 0 | Status: SHIPPED | OUTPATIENT
Start: 2025-08-01 | End: 2025-08-09

## 2025-08-01 RX ORDER — KETOCONAZOLE 20 MG/ML
SHAMPOO, SUSPENSION TOPICAL
Qty: 180 ML | Refills: 1 | Status: SHIPPED | OUTPATIENT
Start: 2025-08-01

## 2025-08-01 RX ORDER — CLOTRIMAZOLE 1 %
CREAM (GRAM) TOPICAL
Qty: 30 G | Refills: 1 | Status: SHIPPED | OUTPATIENT
Start: 2025-08-01 | End: 2025-08-08

## 2025-08-01 RX ORDER — CLOTRIMAZOLE 1 %
CREAM (GRAM) TOPICAL
Qty: 30 G | Refills: 1 | Status: SHIPPED | OUTPATIENT
Start: 2025-08-01 | End: 2025-08-01

## 2025-08-01 RX ORDER — FLUCONAZOLE 150 MG/1
150 TABLET ORAL WEEKLY
Qty: 2 TABLET | Refills: 0 | Status: SHIPPED | OUTPATIENT
Start: 2025-08-01 | End: 2025-08-01

## 2025-08-01 NOTE — DISCHARGE INSTRUCTIONS
Return to the ER for severe pain, difficulty breathing, inability tolerate food or fluid by mouth, new fever, new chest pain or shortness of breath, any other concerning signs or symptoms.

## 2025-08-01 NOTE — ED PROVIDER NOTES
[facility] EMERGENCY DEPARTMENT  EMERGENCY DEPARTMENT ENCOUNTER      Pt Name: Phong Lucas  MRN: [id number]  Birthdate 1960  Date of evaluation: 8/1/2025  Provider: Kaleb Alfaro MD  7:21 AM    CHIEF COMPLAINT       Chief Complaint   Patient presents with    Rash         HISTORY OF PRESENT ILLNESS    Phong Lucsa is a 64 y.o. male who presents to the emergency department      64-year-old male who denies past medical history but notes he may have a liver problem, recently had surgery for hernia at this facility here with 1 month of a steadily worsening rash to his scalp and to his anterior chest and upper abdomen that is becoming more itchy as time progresses.  Has not tried any treatment, is somewhat transient and living in a new place but denies dirty bedsheets or exposure to scabies.  No fevers, no vomiting, no chest pain or shortness of breath, tolerating p.o. without any issue, no recent illness.  Otherwise notes he feels well but is concerned about the itchy rash.  Nothing on the hands or feet, nothing on the legs or arms rash is contained to his body and scalp.          Nursing Notes were reviewed.    REVIEW OF SYSTEMS       Review of Systems    Except as noted above the remainder of the review of systems was reviewed and negative.       PAST MEDICAL HISTORY     Past Medical History:   Diagnosis Date    Cardiomyopathy (HCC)     Cocaine abuse (HCC)     HTN (hypertension)     Hypertension     Stroke (HCC)     Stroke risk 1986,2004, 2009    pt stated he had a stroke in above dates         SURGICAL HISTORY       Past Surgical History:   Procedure Laterality Date    CARDIAC PROCEDURE Right 3/10/2023    LEFT HEART CATH / CORONARY ANGIOGRAPHY performed by Yobani LAKHANI MD at [facility] CARDIAC CATH LAB    CARDIAC PROCEDURE Right 3/10/2023    Left ventriculography performed by Yobani LAKHANI MD at [facility] CARDIAC CATH LAB    CYST REMOVAL  1993    right front of the forehead    HERNIA REPAIR  1993

## 2025-08-21 ENCOUNTER — HOSPITAL ENCOUNTER (EMERGENCY)
Facility: HOSPITAL | Age: 65
Discharge: HOME | End: 2025-08-21
Payer: MEDICARE

## 2025-08-21 VITALS
HEART RATE: 66 BPM | WEIGHT: 150 LBS | BODY MASS INDEX: 23.54 KG/M2 | RESPIRATION RATE: 20 BRPM | TEMPERATURE: 98.4 F | SYSTOLIC BLOOD PRESSURE: 121 MMHG | OXYGEN SATURATION: 97 % | DIASTOLIC BLOOD PRESSURE: 86 MMHG | HEIGHT: 67 IN

## 2025-08-21 DIAGNOSIS — B35.4 TINEA CORPORIS: ICD-10-CM

## 2025-08-21 DIAGNOSIS — L50.9 URTICARIA: ICD-10-CM

## 2025-08-21 DIAGNOSIS — R21 RASH AND OTHER NONSPECIFIC SKIN ERUPTION: Primary | ICD-10-CM

## 2025-08-21 PROCEDURE — 6370000000 HC RX 637 (ALT 250 FOR IP)

## 2025-08-21 PROCEDURE — 99283 EMERGENCY DEPT VISIT LOW MDM: CPT

## 2025-08-21 RX ORDER — HYDROXYZINE HYDROCHLORIDE 25 MG/1
25 TABLET, FILM COATED ORAL EVERY 8 HOURS PRN
Qty: 15 TABLET | Refills: 0 | Status: SHIPPED | OUTPATIENT
Start: 2025-08-21 | End: 2025-08-26

## 2025-08-21 RX ORDER — CALAMINE 8% AND ZINC OXIDE 8% 160 MG/ML
5 LOTION TOPICAL 3 TIMES DAILY PRN
Qty: 177 ML | Refills: 0 | Status: SHIPPED | OUTPATIENT
Start: 2025-08-21 | End: 2025-08-28

## 2025-08-21 RX ORDER — TERBINAFINE HYDROCHLORIDE 250 MG/1
250 TABLET ORAL DAILY
Qty: 14 TABLET | Refills: 0 | Status: SHIPPED | OUTPATIENT
Start: 2025-08-21 | End: 2025-09-04

## 2025-08-21 RX ORDER — HYDROXYZINE HYDROCHLORIDE 25 MG/1
25 TABLET, FILM COATED ORAL
Status: COMPLETED | OUTPATIENT
Start: 2025-08-21 | End: 2025-08-21

## 2025-08-21 RX ADMIN — HYDROXYZINE HYDROCHLORIDE 25 MG: 25 TABLET, FILM COATED ORAL at 11:08

## 2025-08-21 ASSESSMENT — ENCOUNTER SYMPTOMS
EYES NEGATIVE: 1
RESPIRATORY NEGATIVE: 1
GASTROINTESTINAL NEGATIVE: 1

## (undated) DEVICE — PROCEDURE KIT FLUID MGMT 10 FR CUST MAINFOLD

## (undated) DEVICE — STRAP,POSITIONING,KNEE/BODY,FOAM,4X60": Brand: MEDLINE

## (undated) DEVICE — SUTURE VICRYL SZ 2-0 L27IN ABSRB UD L26MM SH 1/2 CIR J417H

## (undated) DEVICE — SET FLD ADMIN 3 W STPCOCK FIX FEM L BOR 1IN

## (undated) DEVICE — GLOVE SURG SZ 65 L12IN FNGR THK79MIL GRN LTX FREE

## (undated) DEVICE — MASTISOL ADHESIVE LIQ 2/3ML

## (undated) DEVICE — SUTURE MONOCRYL SZ 4-0 L27IN ABSRB UD L24MM PS-1 3/8 CIR PRIM Y935H

## (undated) DEVICE — DRAPE,T,LAPARO,TRANS,STERILE: Brand: MEDLINE

## (undated) DEVICE — ELECTRODE PT RET AD L9FT HI MOIST COND ADH HYDRGEL CORDED

## (undated) DEVICE — PENROSE TUBING RADIOPAQUE: Brand: ARGYLE

## (undated) DEVICE — EVACUATOR SMOKE L 10 FT SMOKE MANAGEMENT EXTENDED EDGE ELECTRODE STERILE DISP

## (undated) DEVICE — Device

## (undated) DEVICE — STRIP SKIN CLSR W1XL5IN NYLON REINF CURAD STERIL

## (undated) DEVICE — BLADE,STAINLESS-STEEL,15,STRL,DISPOSABLE: Brand: MEDLINE

## (undated) DEVICE — SUTURE MONOCRYL STRATAFIX SPRL + SZ 4-0 L12IN ABSRB UD PS-2 SXMP1B117

## (undated) DEVICE — PACK PROCEDURE SURG VASC CATH 161 MMC LF

## (undated) DEVICE — BAND COMPR L24CM REG CLR PLAS HEMSTAT EXT HK AND LOOP RETEN

## (undated) DEVICE — BLADE ES ELASTOMERIC COAT INSUL DURABLE BEND UPTO 90DEG

## (undated) DEVICE — CLEAN UP KIT: Brand: MEDLINE INDUSTRIES, INC.

## (undated) DEVICE — SUTURE VICRYL + SZ 3-0 L27IN ABSRB UD L26MM SH 1/2 CIR VCP416H

## (undated) DEVICE — SUTURE ETHIBOND EXCEL SZ 0 L18IN NONABSORBABLE GRN L26MM CT-2 CX27D

## (undated) DEVICE — SOLUTION IRRIG 1000ML 0.9% SOD CHL USP POUR PLAS BTL

## (undated) DEVICE — PRESSURE MONITORING SET: Brand: TRUWAVE

## (undated) DEVICE — DRAPE TOWEL: Brand: CONVERTORS

## (undated) DEVICE — RADIFOCUS OPTITORQUE ANGIOGRAPHIC CATHETER: Brand: OPTITORQUE

## (undated) DEVICE — GLOVE SURG SZ 6 CRM LTX FREE POLYISOPRENE POLYMER BEAD ANTI